# Patient Record
Sex: MALE | Race: WHITE | URBAN - METROPOLITAN AREA
[De-identification: names, ages, dates, MRNs, and addresses within clinical notes are randomized per-mention and may not be internally consistent; named-entity substitution may affect disease eponyms.]

---

## 2014-05-06 RX ORDER — RIVAROXABAN 15 MG-20MG
1 KIT ORAL
Qty: 0 | Refills: 0 | DISCHARGE
Start: 2014-05-06

## 2017-10-18 ENCOUNTER — INPATIENT (INPATIENT)
Facility: HOSPITAL | Age: 77
LOS: 5 days | Discharge: ANOTHER HOSPITAL | End: 2017-10-24
Attending: INTERNAL MEDICINE | Admitting: FAMILY MEDICINE
Payer: MEDICARE

## 2017-10-18 VITALS
HEART RATE: 61 BPM | DIASTOLIC BLOOD PRESSURE: 61 MMHG | TEMPERATURE: 98 F | SYSTOLIC BLOOD PRESSURE: 140 MMHG | RESPIRATION RATE: 15 BRPM | OXYGEN SATURATION: 94 % | WEIGHT: 169.98 LBS | HEIGHT: 70 IN

## 2017-10-18 DIAGNOSIS — Z95.5 PRESENCE OF CORONARY ANGIOPLASTY IMPLANT AND GRAFT: Chronic | ICD-10-CM

## 2017-10-18 DIAGNOSIS — Z95.1 PRESENCE OF AORTOCORONARY BYPASS GRAFT: Chronic | ICD-10-CM

## 2017-10-18 DIAGNOSIS — Z98.89 OTHER SPECIFIED POSTPROCEDURAL STATES: Chronic | ICD-10-CM

## 2017-10-18 LAB
ALBUMIN SERPL ELPH-MCNC: 3.5 G/DL — SIGNIFICANT CHANGE UP (ref 3.3–5)
ALP SERPL-CCNC: 54 U/L — SIGNIFICANT CHANGE UP (ref 40–120)
ALT FLD-CCNC: 14 U/L — SIGNIFICANT CHANGE UP (ref 12–78)
ANION GAP SERPL CALC-SCNC: 11 MMOL/L — SIGNIFICANT CHANGE UP (ref 5–17)
ANISOCYTOSIS BLD QL: SLIGHT — SIGNIFICANT CHANGE UP
APTT BLD: 32 SEC — SIGNIFICANT CHANGE UP (ref 27.5–37.4)
AST SERPL-CCNC: 15 U/L — SIGNIFICANT CHANGE UP (ref 15–37)
BASOPHILS # BLD AUTO: 0.1 K/UL — SIGNIFICANT CHANGE UP (ref 0–0.2)
BASOPHILS NFR BLD AUTO: 0.7 % — SIGNIFICANT CHANGE UP (ref 0–2)
BILIRUB SERPL-MCNC: 0.9 MG/DL — SIGNIFICANT CHANGE UP (ref 0.2–1.2)
BUN SERPL-MCNC: 13 MG/DL — SIGNIFICANT CHANGE UP (ref 7–23)
CALCIUM SERPL-MCNC: 9.4 MG/DL — SIGNIFICANT CHANGE UP (ref 8.5–10.1)
CHLORIDE SERPL-SCNC: 101 MMOL/L — SIGNIFICANT CHANGE UP (ref 96–108)
CHOLEST SERPL-MCNC: 89 MG/DL — SIGNIFICANT CHANGE UP (ref 10–199)
CO2 SERPL-SCNC: 23 MMOL/L — SIGNIFICANT CHANGE UP (ref 22–31)
CREAT SERPL-MCNC: 1.42 MG/DL — HIGH (ref 0.5–1.3)
DIGOXIN SERPL-MCNC: 1.5 NG/ML — SIGNIFICANT CHANGE UP (ref 0.8–2)
EOSINOPHIL # BLD AUTO: 0.2 K/UL — SIGNIFICANT CHANGE UP (ref 0–0.5)
EOSINOPHIL NFR BLD AUTO: 1.7 % — SIGNIFICANT CHANGE UP (ref 0–6)
GLUCOSE BLDC GLUCOMTR-MCNC: 129 MG/DL — HIGH (ref 70–99)
GLUCOSE BLDC GLUCOMTR-MCNC: 197 MG/DL — HIGH (ref 70–99)
GLUCOSE SERPL-MCNC: 197 MG/DL — HIGH (ref 70–99)
HCT VFR BLD CALC: 40.7 % — SIGNIFICANT CHANGE UP (ref 39–50)
HDLC SERPL-MCNC: 34 MG/DL — LOW (ref 40–125)
HGB BLD-MCNC: 14 G/DL — SIGNIFICANT CHANGE UP (ref 13–17)
INR BLD: 1.39 RATIO — HIGH (ref 0.88–1.16)
LIPID PNL WITH DIRECT LDL SERPL: 32 MG/DL — SIGNIFICANT CHANGE UP
LYMPHOCYTES # BLD AUTO: 2.9 K/UL — SIGNIFICANT CHANGE UP (ref 1–3.3)
LYMPHOCYTES # BLD AUTO: 28.3 % — SIGNIFICANT CHANGE UP (ref 13–44)
MANUAL DIF COMMENT BLD-IMP: SIGNIFICANT CHANGE UP
MCHC RBC-ENTMCNC: 31.8 PG — SIGNIFICANT CHANGE UP (ref 27–34)
MCHC RBC-ENTMCNC: 34.3 GM/DL — SIGNIFICANT CHANGE UP (ref 32–36)
MCV RBC AUTO: 92.8 FL — SIGNIFICANT CHANGE UP (ref 80–100)
MONOCYTES # BLD AUTO: 1.7 K/UL — HIGH (ref 0–0.9)
MONOCYTES NFR BLD AUTO: 16.6 % — HIGH (ref 2–14)
NEUTROPHILS # BLD AUTO: 5.4 K/UL — SIGNIFICANT CHANGE UP (ref 1.8–7.4)
NEUTROPHILS NFR BLD AUTO: 52.7 % — SIGNIFICANT CHANGE UP (ref 43–77)
PLAT MORPH BLD: NORMAL — SIGNIFICANT CHANGE UP
PLATELET # BLD AUTO: 205 K/UL — SIGNIFICANT CHANGE UP (ref 150–400)
POIKILOCYTOSIS BLD QL AUTO: SLIGHT — SIGNIFICANT CHANGE UP
POTASSIUM SERPL-MCNC: 3.3 MMOL/L — LOW (ref 3.5–5.3)
POTASSIUM SERPL-SCNC: 3.3 MMOL/L — LOW (ref 3.5–5.3)
PROT SERPL-MCNC: 7 GM/DL — SIGNIFICANT CHANGE UP (ref 6–8.3)
PROTHROM AB SERPL-ACNC: 15.1 SEC — HIGH (ref 9.8–12.7)
RAPID RVP RESULT: DETECTED
RBC # BLD: 4.39 M/UL — SIGNIFICANT CHANGE UP (ref 4.2–5.8)
RBC # FLD: 12.2 % — SIGNIFICANT CHANGE UP (ref 10.3–14.5)
RBC BLD AUTO: SIGNIFICANT CHANGE UP
RV+EV RNA SPEC QL NAA+PROBE: DETECTED
SODIUM SERPL-SCNC: 135 MMOL/L — SIGNIFICANT CHANGE UP (ref 135–145)
TOTAL CHOLESTEROL/HDL RATIO MEASUREMENT: 2.6 RATIO — LOW (ref 3.4–9.6)
TRIGL SERPL-MCNC: 116 MG/DL — SIGNIFICANT CHANGE UP (ref 10–149)
TROPONIN I SERPL-MCNC: 0.02 NG/ML — SIGNIFICANT CHANGE UP (ref 0.01–0.04)
TROPONIN I SERPL-MCNC: 0.02 NG/ML — SIGNIFICANT CHANGE UP (ref 0.01–0.04)
TROPONIN I SERPL-MCNC: 0.04 NG/ML — SIGNIFICANT CHANGE UP (ref 0.01–0.04)
TSH SERPL-MCNC: 3.71 UU/ML — SIGNIFICANT CHANGE UP (ref 0.36–3.74)
WBC # BLD: 10.2 K/UL — SIGNIFICANT CHANGE UP (ref 3.8–10.5)
WBC # FLD AUTO: 10.2 K/UL — SIGNIFICANT CHANGE UP (ref 3.8–10.5)

## 2017-10-18 PROCEDURE — 72125 CT NECK SPINE W/O DYE: CPT | Mod: 26

## 2017-10-18 PROCEDURE — 99285 EMERGENCY DEPT VISIT HI MDM: CPT

## 2017-10-18 PROCEDURE — 71010: CPT | Mod: 26

## 2017-10-18 PROCEDURE — 70450 CT HEAD/BRAIN W/O DYE: CPT | Mod: 26

## 2017-10-18 PROCEDURE — 93010 ELECTROCARDIOGRAM REPORT: CPT

## 2017-10-18 PROCEDURE — 71250 CT THORAX DX C-: CPT | Mod: 26

## 2017-10-18 RX ORDER — INSULIN LISPRO 100/ML
VIAL (ML) SUBCUTANEOUS AT BEDTIME
Qty: 0 | Refills: 0 | Status: DISCONTINUED | OUTPATIENT
Start: 2017-10-18 | End: 2017-10-24

## 2017-10-18 RX ORDER — DIGOXIN 250 MCG
0.12 TABLET ORAL DAILY
Qty: 0 | Refills: 0 | Status: DISCONTINUED | OUTPATIENT
Start: 2017-10-18 | End: 2017-10-19

## 2017-10-18 RX ORDER — SODIUM CHLORIDE 9 MG/ML
1000 INJECTION, SOLUTION INTRAVENOUS
Qty: 0 | Refills: 0 | Status: DISCONTINUED | OUTPATIENT
Start: 2017-10-18 | End: 2017-10-24

## 2017-10-18 RX ORDER — CEFTRIAXONE 500 MG/1
1 INJECTION, POWDER, FOR SOLUTION INTRAMUSCULAR; INTRAVENOUS ONCE
Qty: 0 | Refills: 0 | Status: COMPLETED | OUTPATIENT
Start: 2017-10-18 | End: 2017-10-18

## 2017-10-18 RX ORDER — DEXTROSE 50 % IN WATER 50 %
1 SYRINGE (ML) INTRAVENOUS ONCE
Qty: 0 | Refills: 0 | Status: DISCONTINUED | OUTPATIENT
Start: 2017-10-18 | End: 2017-10-24

## 2017-10-18 RX ORDER — GLUCAGON INJECTION, SOLUTION 0.5 MG/.1ML
1 INJECTION, SOLUTION SUBCUTANEOUS ONCE
Qty: 0 | Refills: 0 | Status: DISCONTINUED | OUTPATIENT
Start: 2017-10-18 | End: 2017-10-24

## 2017-10-18 RX ORDER — CEFTRIAXONE 500 MG/1
1 INJECTION, POWDER, FOR SOLUTION INTRAMUSCULAR; INTRAVENOUS EVERY 24 HOURS
Qty: 0 | Refills: 0 | Status: DISCONTINUED | OUTPATIENT
Start: 2017-10-19 | End: 2017-10-24

## 2017-10-18 RX ORDER — RIVAROXABAN 15 MG-20MG
10 KIT ORAL EVERY 24 HOURS
Qty: 0 | Refills: 0 | Status: DISCONTINUED | OUTPATIENT
Start: 2017-10-18 | End: 2017-10-18

## 2017-10-18 RX ORDER — ASPIRIN/CALCIUM CARB/MAGNESIUM 324 MG
81 TABLET ORAL DAILY
Qty: 0 | Refills: 0 | Status: DISCONTINUED | OUTPATIENT
Start: 2017-10-18 | End: 2017-10-24

## 2017-10-18 RX ORDER — AZITHROMYCIN 500 MG/1
500 TABLET, FILM COATED ORAL EVERY 24 HOURS
Qty: 0 | Refills: 0 | Status: DISCONTINUED | OUTPATIENT
Start: 2017-10-19 | End: 2017-10-20

## 2017-10-18 RX ORDER — RIVAROXABAN 15 MG-20MG
15 KIT ORAL EVERY 24 HOURS
Qty: 0 | Refills: 0 | Status: DISCONTINUED | OUTPATIENT
Start: 2017-10-18 | End: 2017-10-24

## 2017-10-18 RX ORDER — CARVEDILOL PHOSPHATE 80 MG/1
6.25 CAPSULE, EXTENDED RELEASE ORAL EVERY 12 HOURS
Qty: 0 | Refills: 0 | Status: DISCONTINUED | OUTPATIENT
Start: 2017-10-18 | End: 2017-10-24

## 2017-10-18 RX ORDER — INSULIN LISPRO 100/ML
VIAL (ML) SUBCUTANEOUS
Qty: 0 | Refills: 0 | Status: DISCONTINUED | OUTPATIENT
Start: 2017-10-18 | End: 2017-10-24

## 2017-10-18 RX ORDER — DEXTROSE 50 % IN WATER 50 %
25 SYRINGE (ML) INTRAVENOUS ONCE
Qty: 0 | Refills: 0 | Status: DISCONTINUED | OUTPATIENT
Start: 2017-10-18 | End: 2017-10-24

## 2017-10-18 RX ORDER — SPIRONOLACTONE 25 MG/1
25 TABLET, FILM COATED ORAL DAILY
Qty: 0 | Refills: 0 | Status: DISCONTINUED | OUTPATIENT
Start: 2017-10-18 | End: 2017-10-24

## 2017-10-18 RX ORDER — DEXTROSE 50 % IN WATER 50 %
12.5 SYRINGE (ML) INTRAVENOUS ONCE
Qty: 0 | Refills: 0 | Status: DISCONTINUED | OUTPATIENT
Start: 2017-10-18 | End: 2017-10-24

## 2017-10-18 RX ORDER — ACETAMINOPHEN 500 MG
650 TABLET ORAL EVERY 6 HOURS
Qty: 0 | Refills: 0 | Status: DISCONTINUED | OUTPATIENT
Start: 2017-10-18 | End: 2017-10-24

## 2017-10-18 RX ORDER — AZITHROMYCIN 500 MG/1
500 TABLET, FILM COATED ORAL ONCE
Qty: 0 | Refills: 0 | Status: COMPLETED | OUTPATIENT
Start: 2017-10-18 | End: 2017-10-18

## 2017-10-18 RX ORDER — TAMSULOSIN HYDROCHLORIDE 0.4 MG/1
0.4 CAPSULE ORAL AT BEDTIME
Qty: 0 | Refills: 0 | Status: DISCONTINUED | OUTPATIENT
Start: 2017-10-18 | End: 2017-10-24

## 2017-10-18 RX ORDER — POTASSIUM CHLORIDE 20 MEQ
40 PACKET (EA) ORAL ONCE
Qty: 0 | Refills: 0 | Status: COMPLETED | OUTPATIENT
Start: 2017-10-18 | End: 2017-10-18

## 2017-10-18 RX ORDER — ATORVASTATIN CALCIUM 80 MG/1
20 TABLET, FILM COATED ORAL AT BEDTIME
Qty: 0 | Refills: 0 | Status: DISCONTINUED | OUTPATIENT
Start: 2017-10-18 | End: 2017-10-24

## 2017-10-18 RX ADMIN — Medication 1: at 17:33

## 2017-10-18 RX ADMIN — AZITHROMYCIN 255 MILLIGRAM(S): 500 TABLET, FILM COATED ORAL at 14:08

## 2017-10-18 RX ADMIN — TAMSULOSIN HYDROCHLORIDE 0.4 MILLIGRAM(S): 0.4 CAPSULE ORAL at 22:05

## 2017-10-18 RX ADMIN — Medication 40 MILLIEQUIVALENT(S): at 14:16

## 2017-10-18 RX ADMIN — Medication 81 MILLIGRAM(S): at 14:17

## 2017-10-18 RX ADMIN — ATORVASTATIN CALCIUM 20 MILLIGRAM(S): 80 TABLET, FILM COATED ORAL at 22:05

## 2017-10-18 RX ADMIN — CARVEDILOL PHOSPHATE 6.25 MILLIGRAM(S): 80 CAPSULE, EXTENDED RELEASE ORAL at 17:27

## 2017-10-18 RX ADMIN — SPIRONOLACTONE 25 MILLIGRAM(S): 25 TABLET, FILM COATED ORAL at 14:16

## 2017-10-18 RX ADMIN — RIVAROXABAN 15 MILLIGRAM(S): KIT at 17:27

## 2017-10-18 RX ADMIN — CEFTRIAXONE 100 GRAM(S): 500 INJECTION, POWDER, FOR SOLUTION INTRAMUSCULAR; INTRAVENOUS at 14:23

## 2017-10-18 NOTE — H&P ADULT - NSHPPHYSICALEXAM_GEN_ALL_CORE
Vital Signs Last 24 Hrs  T(C): 36.4 (18 Oct 2017 10:43), Max: 36.4 (18 Oct 2017 10:43)  T(F): 97.5 (18 Oct 2017 10:43), Max: 97.5 (18 Oct 2017 10:43)  HR: 61 (18 Oct 2017 10:43) (61 - 61)  BP: 140/61 (18 Oct 2017 10:43) (140/61 - 140/61)  RR: 15 (18 Oct 2017 10:43) (15 - 15)  SpO2: 94% (18 Oct 2017 10:43) (94% - 94%)

## 2017-10-18 NOTE — ED PROVIDER NOTE - CARE PLAN
Principal Discharge DX:	Syncope, unspecified syncope type  Secondary Diagnosis:	Pneumonia of right lung due to infectious organism, unspecified part of lung

## 2017-10-18 NOTE — H&P ADULT - PSH
S/P CABG x 3  1997  S/P carotid endarterectomy  left, S/P left carotid stent 2000  S/P lobectomy of lung  right upper 1998  S/P TURP    Stented coronary artery  2002, 2003

## 2017-10-18 NOTE — ED ADULT TRIAGE NOTE - CHIEF COMPLAINT QUOTE
syncope walking back from [facility], on blood thinners, denies injury at this time, unknown if hit head. trauma alert initiated.

## 2017-10-18 NOTE — ED PROVIDER NOTE - MEDICAL DECISION MAKING DETAILS
pt presenting s/p syncope and head trauma. Currently feels fatigue. Will check basic labs, CT head, trop and EKG. Admission for syncope.

## 2017-10-18 NOTE — H&P ADULT - ASSESSMENT
76 yo male with PMH of CAD s/p CABG and stents, CHF (unknown EF) HTN, HLD, DM2, prostate CA, h/o lung malignancy s/p radiation, h/o RUL lobectomy for recurrent PNA, A. fib on xarelto brought in by EMS for episode of syncope. Pt does not recall the whole event. As per wife at bedside pt was brushing his teeth and putting in his dentures when she heard a loud noise from the bathroom. Patients son ran into the bathroom and found his father on the floor. He picked him up and carried him to the bed. Prior to episode pt states he felt weak but denies any dizziness, chest pain, palpitations, headaches, SOB, abd pain, N/V. As per wife pt developed some rhinorrhea and cough last night. No fevers or chills. No recent travel, no sick contacts. He was feeling lethargic and has not been eating since last night.     In ED pt given ceftriaxone and azithromycin for possible PNA.     #Syncope - differential includes neurogenic vs cardiac vs vasovagal syncope  - admit to tele to r/o arrhythmias  - EKG with multiple PVCs  - continue ASA, BB, statin  - serial cardiac enzymes  - echo  - CT head negative  - EEG  - cardio consult    #Possible PNA vs pneumonitis on CXR  - pt with history of radiation to right lung, likely post radiation changes seen on CXR, will check CT chest  - hold off further abx for now until CT completed  - RVP    #A. fib  - currently rate controlled  - continue digoxin and coreg  - check dig level  - continue xarelto    #CKD stage 3  - unknown baseline Cr (Cr 1.8 5/2016)  - monitor renal panel    #CAD s/p CABG and stents  - continue ASA, BB, statin    #CHF - likely systolic  - continue BB, metolazone, aldactone  - check echo    #DM2  - check HbA1c  - hold metformin  - ISS  - diabetic diet    #HTN  - continue home meds    #HLD  - continue statin    #DVT prophylaxis  - on xarelto 78 yo male with PMH of CAD s/p CABG and stents, CHF (unknown EF) HTN, HLD, DM2, prostate CA, h/o lung malignancy s/p radiation, h/o RUL lobectomy for recurrent PNA, A. fib on xarelto brought in by EMS for episode of syncope. Pt does not recall the whole event. As per wife at bedside pt was brushing his teeth and putting in his dentures when she heard a loud noise from the bathroom. Patients son ran into the bathroom and found his father on the floor. He picked him up and carried him to the bed. Prior to episode pt states he felt weak but denies any dizziness, chest pain, palpitations, headaches, SOB, abd pain, N/V. As per wife pt developed some rhinorrhea and cough last night. No fevers or chills. No recent travel, no sick contacts. He was feeling lethargic and has not been eating since last night.     In ED pt given ceftriaxone and azithromycin for possible PNA.     #Syncope - differential includes neurogenic vs cardiac vs vasovagal syncope  - admit to tele to r/o arrhythmias  - EKG with multiple PVCs  - continue ASA, BB, statin  - serial cardiac enzymes  - echo  - CT head negative  - EEG  - cardio consult  - check orthostatics    #Possible PNA vs pneumonitis on CXR  - pt with history of radiation to right lung, likely post radiation changes seen on CXR, will check CT chest  - hold off further abx for now until CT completed  - RVP    #A. fib  - currently rate controlled  - continue digoxin and coreg  - check dig level  - continue xarelto    #CKD stage 3  - unknown baseline Cr (Cr 1.8 5/2016)  - monitor renal panel    #CAD s/p CABG and stents  - continue ASA, BB, statin    #CHF - likely systolic  - continue BB, metolazone, aldactone  - check echo    #DM2  - check HbA1c  - hold metformin  - ISS  - diabetic diet    #HTN  - continue home meds    #HLD  - continue statin    #DVT prophylaxis  - on xarelto 78 yo male with PMH of CAD s/p CABG and stents, CHF (unknown EF) HTN, HLD, DM2, prostate CA, h/o lung malignancy s/p radiation, h/o RUL lobectomy for recurrent PNA, A. fib on xarelto brought in by EMS for episode of syncope. Pt does not recall the whole event. As per wife at bedside pt was brushing his teeth and putting in his dentures when she heard a loud noise from the bathroom. Patients son ran into the bathroom and found his father on the floor. He picked him up and carried him to the bed. Prior to episode pt states he felt weak but denies any dizziness, chest pain, palpitations, headaches, SOB, abd pain, N/V. As per wife pt developed some rhinorrhea and cough last night. No fevers or chills. No recent travel, no sick contacts. He was feeling lethargic and has not been eating since last night.     In ED pt given ceftriaxone and azithromycin for possible PNA.     #Syncope - differential includes neurogenic vs cardiac vs vasovagal syncope  - admit to tele to r/o arrhythmias  - EKG with multiple PVCs, ST-T wave abnormalities  - continue ASA, BB, statin  - serial cardiac enzymes  - echo  - CT head negative  - EEG  - cardio consult  - check orthostatics    #Possible PNA vs pneumonitis on CXR  - pt with history of radiation to right lung, likely post radiation changes seen on CXR, will check CT chest  - hold off further abx for now until CT completed  - RVP    #A. fib  - currently rate controlled  - continue digoxin and coreg  - check dig level  - continue xarelto    #CKD stage 3  - unknown baseline Cr (Cr 1.8 5/2016)  - monitor renal panel    #CAD s/p CABG and stents  - continue ASA, BB, statin    #CHF - likely systolic  - continue BB, metolazone, aldactone  - check echo    #DM2  - check HbA1c  - hold metformin  - ISS  - diabetic diet    #HTN  - continue home meds    #HLD  - continue statin    #DVT prophylaxis  - on xarelto 78 yo male with PMH of CAD s/p CABG and stents, CHF (unknown EF) HTN, HLD, DM2, prostate CA, h/o lung malignancy s/p radiation, h/o RUL lobectomy for recurrent PNA, A. fib on xarelto brought in by EMS for episode of syncope. Pt does not recall the whole event. As per wife at bedside pt was brushing his teeth and putting in his dentures when she heard a loud noise from the bathroom. Patients son ran into the bathroom and found his father on the floor. He picked him up and carried him to the bed. Prior to episode pt states he felt weak but denies any dizziness, chest pain, palpitations, headaches, SOB, abd pain, N/V. As per wife pt developed some rhinorrhea and cough last night. No fevers or chills. No recent travel, no sick contacts. He was feeling lethargic and has not been eating since last night.     In ED pt given ceftriaxone and azithromycin for possible PNA.     #Syncope - differential includes neurogenic vs cardiac vs vasovagal syncope  - admit to tele to r/o arrhythmias  - EKG with multiple PVCs, ST-T wave abnormalities  - continue ASA, BB, statin  - serial cardiac enzymes  - echo  - CT head negative  - EEG  - cardio consult  - check orthostatics    #RLL PNA - community acquired  - will treat for gram negative bacteria  - continue ceftriaxone and azithromycin  - f/u cultures  - RVP    #A. fib  - currently rate controlled  - continue digoxin and coreg  - check dig level  - continue xarelto    #CKD stage 3  - unknown baseline Cr (Cr 1.8 5/2016)  - monitor renal panel    #CAD s/p CABG and stents  - continue ASA, BB, statin    #CHF - likely systolic  - continue BB, metolazone, aldactone  - check echo    #DM2  - check HbA1c  - hold metformin  - ISS  - diabetic diet    #HTN  - continue home meds    #HLD  - continue statin    #DVT prophylaxis  - on xarelto 76 yo male with PMH of CAD s/p CABG and stents, CHF (unknown EF) HTN, HLD, DM2, prostate CA, h/o lung malignancy s/p radiation, h/o RUL lobectomy for recurrent PNA, A. fib on xarelto brought in by EMS for episode of syncope. Pt does not recall the whole event. As per wife at bedside pt was brushing his teeth and putting in his dentures when she heard a loud noise from the bathroom. Patients son ran into the bathroom and found his father on the floor. He picked him up and carried him to the bed. Prior to episode pt states he felt weak but denies any dizziness, chest pain, palpitations, headaches, SOB, abd pain, N/V. As per wife pt developed some rhinorrhea and cough last night. No fevers or chills. No recent travel, no sick contacts. He was feeling lethargic and has not been eating since last night.     In ED pt given ceftriaxone and azithromycin for possible PNA.     #Syncope - differential includes neurogenic vs cardiac vs vasovagal syncope  - admit to tele to r/o arrhythmias  - EKG with multiple PVCs, ST-T wave abnormalities  - continue ASA, BB, statin  - serial cardiac enzymes  - echo  - CT head negative  - cardio consult  - check orthostatics    #RLL PNA - community acquired  - will treat for gram negative bacteria  - continue ceftriaxone and azithromycin  - f/u cultures  - RVP    #A. fib  - currently rate controlled  - continue digoxin and coreg  - check dig level  - continue xarelto    #CKD stage 3  - unknown baseline Cr (Cr 1.8 5/2016)  - monitor renal panel    #CAD s/p CABG and stents  - continue ASA, BB, statin    #CHF - likely systolic  - continue BB, metolazone, aldactone  - check echo    #DM2  - check HbA1c  - hold metformin  - ISS  - diabetic diet    #HTN  - continue home meds    #HLD  - continue statin    #DVT prophylaxis  - on xarelto

## 2017-10-18 NOTE — ED PROVIDER NOTE - PMH
BPH (benign prostatic hyperplasia)    CAD (coronary artery disease)  S/P coronary artery stents 2002, 2003  Carotid stenosis, left    Cataracts, bilateral    DM (diabetes mellitus)    HLD (hyperlipidemia)    HTN (hypertension)    Lung cancer    Osteoarthritis    Prostate ca

## 2017-10-18 NOTE — ED PROVIDER NOTE - PROGRESS NOTE DETAILS
Matthew Sarmiento on behalf of Attending Dr. Zepeda. Pt with community acquired PNA. Will give abx. AJM: Pt stable. admitted to hospitalist on tele for syncope and pna

## 2017-10-18 NOTE — ED PROVIDER NOTE - OBJECTIVE STATEMENT
76 y/o M with PMHx of AFib on Xarelto and heart failure, lung CA, received chemo and in remission, HTN, HLD and DM presents to the ED with wife at bedside s/p syncope this morning. pt states he went to bathroom to urinate and as when he was at the sink to wash his hands he passed out. Pt does not have good memory of the event. Pt's wife states that Pt was trying to put his dentures in while at the sink when he passed out. Pt was found by son against the wall with blank stare. Pt has not passed out before. +fatigue. Denies CP or abd pain. Pt's wife states that Pt had rhinorrhea yesterday and gave him Tylenol and DayQuil after pt was feeling cold. Pt was fine through the night. Pt was give Tylenol and DayQuil this morning as well. No PMD at this time. Dr. Clark at [facility] in [place]. Pt also has pulmonologist associated with [facility] in [place]. 78 y/o M with PMHx of AFib on Xarelto and heart failure, lung CA, received chemo and in remission, HTN, HLD and DM presents to the ED with wife at bedside s/p syncope this morning. pt states he went to bathroom to urinate and as when he was at the sink to wash his hands he passed out. Pt does not have good memory of the event. Pt's wife states that Pt was trying to put his dentures in while at the sink when he passed out. Pt was found by son against the wall with blank stare. Questionable head trauma. Pt has not passed out before. +fatigue. Denies CP or abd pain. Pt's wife states that Pt had rhinorrhea yesterday and gave him Tylenol and DayQuil after pt was feeling cold. Pt was fine through the night. Pt was give Tylenol and DayQuil this morning as well. No PMD at this time. Dr. Clark at [facility] in [place]. Pt also has pulmonologist associated with [facility] in [place].

## 2017-10-18 NOTE — ED ADULT NURSE REASSESSMENT NOTE - NS ED NURSE REASSESS COMMENT FT1
Patient A&Ox4, VSS. Patient denies dizzines, denies n/v. Denies pain/discomfort at this time. Generalized weakness, drowsy but arrousable. Patient A&Ox4, VSS. Patient denies dizziness, denies n/v. Denies pain/discomfort at this time. Generalized weakness, drowsy but arrousable. Dry, nonproductive cough, lungs clear, diminished on R-side to auscultation; denies SOB. Safety & comfort measures maintained. Will continue to monitor.

## 2017-10-18 NOTE — ED ADULT NURSE NOTE - CHIEF COMPLAINT QUOTE
syncope walking back from [facility], on blood thinners, denies injury at this time, unknown if hit head. trauma alert initiated.

## 2017-10-18 NOTE — H&P ADULT - HISTORY OF PRESENT ILLNESS
76 yo male with PMH of CAD s/p CABG and stents, CHF (unknown EF) HTN, HLD, DM2, prostate CA, h/o lung malignancy s/p radiation, h/o RUL lobectomy for recurrent PNA, A. fib on xarelto brought in by EMS for episode of syncope. Pt does not recall the whole event. As per wife at bedside pt was brushing his teeth and putting in his dentures when she heard a loud noise from the bathroom. Patients son ran into the bathroom and found his father on the floor. He picked him up and carried him to the bed. Prior to episode pt states he felt weak but denies any dizziness, chest pain, palpitations, headaches, SOB, abd pain, N/V. As per wife pt developed some rhinorrhea and cough last night. No fevers or chills. No recent travel, no sick contacts. He was feeling lethargic and has not been eating since last night.     In ED pt given ceftriaxone and azithromycin for possible PNA.

## 2017-10-19 ENCOUNTER — TRANSCRIPTION ENCOUNTER (OUTPATIENT)
Age: 77
End: 2017-10-19

## 2017-10-19 LAB
ANION GAP SERPL CALC-SCNC: 9 MMOL/L — SIGNIFICANT CHANGE UP (ref 5–17)
BASOPHILS # BLD AUTO: 0 K/UL — SIGNIFICANT CHANGE UP (ref 0–0.2)
BASOPHILS NFR BLD AUTO: 0.6 % — SIGNIFICANT CHANGE UP (ref 0–2)
BUN SERPL-MCNC: 15 MG/DL — SIGNIFICANT CHANGE UP (ref 7–23)
CALCIUM SERPL-MCNC: 9.2 MG/DL — SIGNIFICANT CHANGE UP (ref 8.5–10.1)
CHLORIDE SERPL-SCNC: 100 MMOL/L — SIGNIFICANT CHANGE UP (ref 96–108)
CO2 SERPL-SCNC: 27 MMOL/L — SIGNIFICANT CHANGE UP (ref 22–31)
CREAT SERPL-MCNC: 1.12 MG/DL — SIGNIFICANT CHANGE UP (ref 0.5–1.3)
EOSINOPHIL # BLD AUTO: 0.2 K/UL — SIGNIFICANT CHANGE UP (ref 0–0.5)
EOSINOPHIL NFR BLD AUTO: 2.7 % — SIGNIFICANT CHANGE UP (ref 0–6)
GLUCOSE BLDC GLUCOMTR-MCNC: 145 MG/DL — HIGH (ref 70–99)
GLUCOSE BLDC GLUCOMTR-MCNC: 145 MG/DL — HIGH (ref 70–99)
GLUCOSE BLDC GLUCOMTR-MCNC: 185 MG/DL — HIGH (ref 70–99)
GLUCOSE BLDC GLUCOMTR-MCNC: 214 MG/DL — HIGH (ref 70–99)
GLUCOSE SERPL-MCNC: 141 MG/DL — HIGH (ref 70–99)
HBA1C BLD-MCNC: 7.9 % — HIGH (ref 4–5.6)
HCT VFR BLD CALC: 37 % — LOW (ref 39–50)
HGB BLD-MCNC: 12.8 G/DL — LOW (ref 13–17)
LYMPHOCYTES # BLD AUTO: 1.1 K/UL — SIGNIFICANT CHANGE UP (ref 1–3.3)
LYMPHOCYTES # BLD AUTO: 16.6 % — SIGNIFICANT CHANGE UP (ref 13–44)
MAGNESIUM SERPL-MCNC: 1.4 MG/DL — LOW (ref 1.6–2.6)
MCHC RBC-ENTMCNC: 31.8 PG — SIGNIFICANT CHANGE UP (ref 27–34)
MCHC RBC-ENTMCNC: 34.5 GM/DL — SIGNIFICANT CHANGE UP (ref 32–36)
MCV RBC AUTO: 92.4 FL — SIGNIFICANT CHANGE UP (ref 80–100)
MONOCYTES # BLD AUTO: 1.1 K/UL — HIGH (ref 0–0.9)
MONOCYTES NFR BLD AUTO: 16.5 % — HIGH (ref 2–14)
NEUTROPHILS # BLD AUTO: 4.3 K/UL — SIGNIFICANT CHANGE UP (ref 1.8–7.4)
NEUTROPHILS NFR BLD AUTO: 63.5 % — SIGNIFICANT CHANGE UP (ref 43–77)
PHOSPHATE SERPL-MCNC: 2.6 MG/DL — SIGNIFICANT CHANGE UP (ref 2.5–4.5)
PLATELET # BLD AUTO: 194 K/UL — SIGNIFICANT CHANGE UP (ref 150–400)
POTASSIUM SERPL-MCNC: 4.2 MMOL/L — SIGNIFICANT CHANGE UP (ref 3.5–5.3)
POTASSIUM SERPL-SCNC: 4.2 MMOL/L — SIGNIFICANT CHANGE UP (ref 3.5–5.3)
RBC # BLD: 4.01 M/UL — LOW (ref 4.2–5.8)
RBC # FLD: 11.8 % — SIGNIFICANT CHANGE UP (ref 10.3–14.5)
SODIUM SERPL-SCNC: 136 MMOL/L — SIGNIFICANT CHANGE UP (ref 135–145)
WBC # BLD: 6.8 K/UL — SIGNIFICANT CHANGE UP (ref 3.8–10.5)
WBC # FLD AUTO: 6.8 K/UL — SIGNIFICANT CHANGE UP (ref 3.8–10.5)

## 2017-10-19 PROCEDURE — 93010 ELECTROCARDIOGRAM REPORT: CPT

## 2017-10-19 PROCEDURE — 93306 TTE W/DOPPLER COMPLETE: CPT | Mod: 26

## 2017-10-19 PROCEDURE — 93042 RHYTHM ECG REPORT: CPT

## 2017-10-19 PROCEDURE — 99223 1ST HOSP IP/OBS HIGH 75: CPT

## 2017-10-19 RX ORDER — AZITHROMYCIN 500 MG/1
500 TABLET, FILM COATED ORAL
Qty: 0 | Refills: 0 | DISCHARGE
Start: 2017-10-19

## 2017-10-19 RX ORDER — ACETAMINOPHEN 500 MG
2 TABLET ORAL
Qty: 0 | Refills: 0 | COMMUNITY
Start: 2017-10-19

## 2017-10-19 RX ORDER — CEFTRIAXONE 500 MG/1
1 INJECTION, POWDER, FOR SOLUTION INTRAMUSCULAR; INTRAVENOUS
Qty: 0 | Refills: 0 | COMMUNITY
Start: 2017-10-19

## 2017-10-19 RX ORDER — LANOLIN ALCOHOL/MO/W.PET/CERES
3 CREAM (GRAM) TOPICAL AT BEDTIME
Qty: 0 | Refills: 0 | Status: DISCONTINUED | OUTPATIENT
Start: 2017-10-19 | End: 2017-10-24

## 2017-10-19 RX ORDER — TAMSULOSIN HYDROCHLORIDE 0.4 MG/1
1 CAPSULE ORAL
Qty: 0 | Refills: 0 | DISCHARGE
Start: 2017-10-19

## 2017-10-19 RX ORDER — INSULIN LISPRO 100/ML
0 VIAL (ML) SUBCUTANEOUS
Qty: 0 | Refills: 0 | COMMUNITY
Start: 2017-10-19

## 2017-10-19 RX ADMIN — CEFTRIAXONE 100 GRAM(S): 500 INJECTION, POWDER, FOR SOLUTION INTRAMUSCULAR; INTRAVENOUS at 09:19

## 2017-10-19 RX ADMIN — CARVEDILOL PHOSPHATE 6.25 MILLIGRAM(S): 80 CAPSULE, EXTENDED RELEASE ORAL at 17:25

## 2017-10-19 RX ADMIN — ATORVASTATIN CALCIUM 20 MILLIGRAM(S): 80 TABLET, FILM COATED ORAL at 21:09

## 2017-10-19 RX ADMIN — Medication 81 MILLIGRAM(S): at 11:37

## 2017-10-19 RX ADMIN — SPIRONOLACTONE 25 MILLIGRAM(S): 25 TABLET, FILM COATED ORAL at 06:02

## 2017-10-19 RX ADMIN — RIVAROXABAN 15 MILLIGRAM(S): KIT at 17:23

## 2017-10-19 RX ADMIN — Medication: at 14:26

## 2017-10-19 RX ADMIN — Medication 3 MILLIGRAM(S): at 02:38

## 2017-10-19 RX ADMIN — AZITHROMYCIN 255 MILLIGRAM(S): 500 TABLET, FILM COATED ORAL at 09:51

## 2017-10-19 RX ADMIN — Medication 100 MILLIGRAM(S): at 21:46

## 2017-10-19 RX ADMIN — TAMSULOSIN HYDROCHLORIDE 0.4 MILLIGRAM(S): 0.4 CAPSULE ORAL at 21:09

## 2017-10-19 RX ADMIN — CARVEDILOL PHOSPHATE 6.25 MILLIGRAM(S): 80 CAPSULE, EXTENDED RELEASE ORAL at 06:02

## 2017-10-19 RX ADMIN — Medication 0.12 MILLIGRAM(S): at 06:02

## 2017-10-19 NOTE — DISCHARGE NOTE ADULT - PATIENT PORTAL LINK FT
“You can access the [employer] Patient Portal, offered by [facility], by registering with the following website: [email or web address]”

## 2017-10-19 NOTE — CONSULT NOTE ADULT - SUBJECTIVE AND OBJECTIVE BOX
CHIEF COMPLAINT: Patient is a 77y old  Male who presents with a chief complaint of I have a spot on my right lung that's gotten bigger, so I'm having part of the right lung removed. (18 Oct 2017 15:06)      HPI:  78 yo male with PMH of CAD s/p CABG and stents, CHF (unknown EF) HTN, HLD, DM2, prostate CA, h/o lung malignancy s/p radiation, h/o RUL lobectomy for recurrent PNA, A. fib on xarelto brought in by EMS for episode of syncope. Pt does not recall the whole event. As per wife at bedside pt was brushing his teeth and putting in his dentures when she heard a loud noise from the bathroom. Patients son ran into the bathroom and found his father on the floor. He picked him up and carried him to the bed. Prior to episode pt states he felt weak but denies any dizziness, chest pain, palpitations, headaches, SOB, abd pain, N/V. As per wife pt developed some rhinorrhea and cough last night. No fevers or chills. No recent travel, no sick contacts. He was feeling lethargic and has not been eating since last night.     In ED pt given ceftriaxone and azithromycin for possible PNA. (18 Oct 2017 12:47)    10/19-patient with significant cardiac risk factors, with telemetry-showing ventricular arrhythmia(AIVR), pauses of over 2.5sec.  Has R infiltrate-unsure if pneumonia or somthing more ominous      PMHx: PAST MEDICAL & SURGICAL HISTORY:  Osteoarthritis  Lung cancer  DM (diabetes mellitus)  Cataracts, bilateral  Prostate ca  BPH (benign prostatic hyperplasia)  Carotid stenosis, left  CAD (coronary artery disease): S/P coronary artery stents 2002, 2003  HLD (hyperlipidemia)  HTN (hypertension)  S/P CABG x 3: 1997  S/P lobectomy of lung: right upper 1998  S/P TURP  Stented coronary artery: 2002, 2003  S/P carotid endarterectomy: left, S/P left carotid stent 2000        Soc Hx:       Allergies: Allergies    morphine (Hypotension)  oxycodone (Other)    Intolerances          REVIEW OF SYSTEMS:    CONSTITUTIONAL: weakness, chills  EYES/ENT: No visual changes;  No vertigo or throat pain   NECK: No pain or stiffness  RESPIRATORY: No cough, wheezing, hemoptysis; No shortness of breath  CARDIOVASCULAR: No chest pain or palpitations  GASTROINTESTINAL: No abdominal or epigastric pain. No nausea, vomiting, or hematemesis; No diarrhea or constipation. No melena or hematochezia.    Vital Signs Last 24 Hrs  T(C): 37 (19 Oct 2017 05:25), Max: 37 (19 Oct 2017 05:25)  T(F): 98.6 (19 Oct 2017 05:25), Max: 98.6 (19 Oct 2017 05:25)  HR: 63 (19 Oct 2017 05:25) (61 - 66)  BP: 117/65 (19 Oct 2017 05:25) (117/65 - 142/62)  BP(mean): 78 (19 Oct 2017 05:25) (78 - 78)  RR: 18 (18 Oct 2017 20:53) (15 - 118)  SpO2: 98% (19 Oct 2017 05:25) (94% - 100%)    I&O's Summary      CAPILLARY BLOOD GLUCOSE      POCT Blood Glucose.: 129 mg/dL (18 Oct 2017 21:34)  POCT Blood Glucose.: 197 mg/dL (18 Oct 2017 16:53)  POCT Blood Glucose.: 193 mg/dL (18 Oct 2017 11:42)      PHYSICAL EXAM:   Constitutional: NAD, awake and alert, well-developed  HEENT: PERR, EOMI, Normal Hearing, MMM  Neck: JVD  Respiratory:  rhonchi  Cardiovascular: S1 and S2, regular rate and rhythm, Murmurs  Gastrointestinal: Bowel Sounds present, soft, nontender, nondistended, no guarding, no rebound  Extremities: No peripheral edema      MEDICATIONS:  MEDICATIONS  (STANDING):  aspirin enteric coated 81 milliGRAM(s) Oral daily  atorvastatin 20 milliGRAM(s) Oral at bedtime  azithromycin  IVPB 500 milliGRAM(s) IV Intermittent every 24 hours  carvedilol 6.25 milliGRAM(s) Oral every 12 hours  cefTRIAXone   IVPB 1 Gram(s) IV Intermittent every 24 hours  dextrose 5%. 1000 milliLiter(s) (50 mL/Hr) IV Continuous <Continuous>  dextrose 50% Injectable 12.5 Gram(s) IV Push once  dextrose 50% Injectable 25 Gram(s) IV Push once  dextrose 50% Injectable 25 Gram(s) IV Push once  digoxin     Tablet 0.125 milliGRAM(s) Oral daily  insulin lispro (HumaLOG) corrective regimen sliding scale   SubCutaneous three times a day before meals  insulin lispro (HumaLOG) corrective regimen sliding scale   SubCutaneous at bedtime  metolazone 2.5 milliGRAM(s) Oral daily  rivaroxaban 15 milliGRAM(s) Oral every 24 hours  spironolactone 25 milliGRAM(s) Oral daily  tamsulosin 0.4 milliGRAM(s) Oral at bedtime      LABS: All Labs Reviewed:                        12.8   6.8   )-----------( 194      ( 19 Oct 2017 05:47 )             37.0     10-19    136  |  100  |  15  ----------------------------<  141<H>  4.2   |  27  |  1.12    Ca    9.2      19 Oct 2017 05:47  Phos  2.6     10-19  Mg     1.4     10-19    TPro  7.0  /  Alb  3.5  /  TBili  0.9  /  DBili  x   /  AST  15  /  ALT  14  /  AlkPhos  54  10-18    PT/INR - ( 18 Oct 2017 11:00 )   PT: 15.1 sec;   INR: 1.39 ratio         PTT - ( 18 Oct 2017 11:00 )  PTT:32.0 sec  CARDIAC MARKERS ( 18 Oct 2017 20:07 )  0.044 ng/mL / x     / x     / x     / x      CARDIAC MARKERS ( 18 Oct 2017 13:53 )  0.024 ng/mL / x     / x     / x     / x      CARDIAC MARKERS ( 18 Oct 2017 11:00 )  0.023 ng/mL / x     / x     / x     / x            Blood Culture:   lipid profile lipid profile                          10-18 @ 13:53  cholesterol           89 mg/dL  Direct LDL            32 mg/dL  HDL cholesterol       34 mg/dL  HDL/Total cholesterol --  Triglycerides, serum  116 mg/dL        RADIOLOGY:  < from: CT Chest No Cont (10.18.17 @ 13:35) >  LUNGS, AIRWAYS: The central airways are patent. There is background   centrilobular emphysema. Prior right upper lobe wedge resection. There is   a large consolidation in the right lower lobe.    EKG:    Telemetry:  sinus rhtyhm, PVC's, AIVR, pauses    ECHO:

## 2017-10-19 NOTE — DISCHARGE NOTE ADULT - CARE PROVIDER_API CALL
PCP/cardio - Dr Damon,   Phone: (   )    -  Fax: (   )    - PCP/Cardio- Dr Lam,   Phone: (   )    -  Fax: (   )    -

## 2017-10-19 NOTE — DISCHARGE NOTE ADULT - MEDICATION SUMMARY - MEDICATIONS TO STOP TAKING
I will STOP taking the medications listed below when I get home from the hospital:    metformin 500 mg oral tablet  -- 1 tab(s) by mouth 2 times a day I will STOP taking the medications listed below when I get home from the hospital:    metformin 500 mg oral tablet  -- 1 tab(s) by mouth 2 times a day    Digitek 125 mcg (0.125 mg) oral tablet  -- 1 tab(s) by mouth once a day

## 2017-10-19 NOTE — DISCHARGE NOTE ADULT - HOSPITAL COURSE
HOSPITALIST PROGRESS NOTE:  SUBJECTIVE:  PCP: All Physicians from [facility]  PMD: Gordo Joseph  Cardio: Dr. Clark  Pulm: Dr. Arroyo    Chief Complaint: Patient is a 77y old  Male who presents with a chief complaint of I have a spot on my right lung that's gotten bigger, so I'm having part of the right lung removed. (18 Oct 2017 15:06)    HPI:  76 yo male with PMH of CAD s/p CABG and stents, CHF (unknown EF) HTN, HLD, DM2, prostate CA, h/o lung malignancy s/p radiation, h/o RUL lobectomy for recurrent PNA, A. fib on xarelto brought in by EMS for episode of syncope. Pt does not recall the whole event. As per wife at bedside pt was brushing his teeth and putting in his dentures when she heard a loud noise from the bathroom. Patients son ran into the bathroom and found his father on the floor. He picked him up and carried him to the bed. Prior to episode pt states he felt weak but denies any dizziness, chest pain, palpitations, headaches, SOB, abd pain, N/V. As per wife pt developed some rhinorrhea and cough last night. No fevers or chills. No recent travel, no sick contacts. He was feeling lethargic and has not been eating since last night.   In ED pt given ceftriaxone and azithromycin for possible PNA. (18 Oct 2017 12:47)    10/19: Overnight patient has been bradycardic; Patient is requesting transfer to [facility] under Dr Lam who is his cardiologist for continuity of care.  Patient has no complaints other than a cough.       #Syncope 2ndry to bradycardia and Pneumonia   - continue tele -  -evidence of AIVR, < 3 sec pause   - EKG with multiple PVCs, ST-T wave abnormalities  - CT head/C-spine - Neg  - continue ASA, BB, statin  - serial cardiac enzymes  - FU echo  - cardio consult appreciated   - EP consult appreciated   - check orthostatics    #RLL PNA - community acquired with superimposed Viral infection  - +RVP with rhinovirus  - CT chest Large consolidation in the right lower lobe.  - will treat for gram negative bacteria  - continue ceftriaxone and azithromycin  - f/u cultures     #A. fib  - currently bradycardia  - continue digoxin and coreg  - dig level WNL  - continue xarelto    #CKD stage 3  - unknown baseline Cr (Cr 1.8 5/2016)  - monitor renal panel    #CAD s/p CABG and stents  - no active chest pain  - continue ASA, BB, statin  #CHF - likely systolic - compensated  - continue BB, metolazone, aldactone  - check echo    #DM2  - HbA1c 7.9  - hold metformin  - ISS  - diabetic diet    #HTN  - continue home meds    #HLD  - continue statin    #DVT prophylaxis  - on xarelto    *Discharge Instructions/Follow Up/Pending Labs:  -Transfer patient to [facility] under Dr Damon;  transfer consent done.     Total time: 45 minutes HOSPITALIST PROGRESS NOTE:  SUBJECTIVE:  PCP: All Physicians from [facility]  PMD: Gordo Joseph  Cardio: Dr. Clark  Pulm: Dr. Arroyo    Chief Complaint: Patient is a 77y old  Male who presents with a chief complaint of I have a spot on my right lung that's gotten bigger, so I'm having part of the right lung removed. (18 Oct 2017 15:06)    HPI:  76 yo male with PMH of CAD s/p CABG and stents, CHF (unknown EF) HTN, HLD, DM2, prostate CA, h/o lung malignancy s/p radiation, h/o RUL lobectomy for recurrent PNA, A. fib on xarelto brought in by EMS for episode of syncope. Pt does not recall the whole event. As per wife at bedside pt was brushing his teeth and putting in his dentures when she heard a loud noise from the bathroom. Patients son ran into the bathroom and found his father on the floor. He picked him up and carried him to the bed. Prior to episode pt states he felt weak but denies any dizziness, chest pain, palpitations, headaches, SOB, abd pain, N/V. As per wife pt developed some rhinorrhea and cough last night. No fevers or chills. No recent travel, no sick contacts. He was feeling lethargic and has not been eating since last night.   In ED pt given ceftriaxone and azithromycin for possible PNA. (18 Oct 2017 12:47)    10/19: Overnight patient has been bradycardic; Patient is requesting transfer to [facility] under Dr Landrum who is his cardiologist for continuity of care.  Patient has no complaints other than a cough.   10/20: yesterday transfer was not initiated by patients cardiologist. Todayspoke with patients cardio, Dr Sue landrum and the transfer center and awaiting bed.  Over night patient had frequent pauses on tele. vitals have been stable     #Syncope 2ndry to bradycardia and Pneumonia   - continue tele -  -evidence of AIVR, < 3 sec pause   - EKG with multiple PVCs, ST-T wave abnormalities  - CT head/C-spine - Neg  - continue ASA, BB, statin  - serial cardiac enzymes  - echo - Moderate global LV hypocontactility; Estimated left ventricular ejection fraction is 40 %.    - cardio consult appreciated   - EP consult appreciated   - check orthostatics    #RLL PNA - community acquired with superimposed Viral infection  - +RVP with rhinovirus  - CT chest Large consolidation in the right lower lobe.  - will treat for gram negative bacteria  - continue ceftriaxone and azithromycin  - f/u cultures     #A. fib  - currently bradycardia  - continue digoxin and coreg  - dig level WNL  - continue xarelto    #CKD stage 3  - unknown baseline Cr (Cr 1.8 5/2016)  - monitor renal panel    #CAD s/p CABG and stents  - no active chest pain  - continue ASA, BB, statin  #CHF - likely systolic - compensated  - continue BB, metolazone, aldactone  - check echo    #DM2  - HbA1c 7.9  - hold metformin  - ISS  - diabetic diet    #HTN  - continue home meds    #HLD  - continue statin    #DVT prophylaxis  - on xarelto    *Discharge Instructions/Follow Up/Pending Labs:  [facility] sue Landrum; Discussed with Dr landrum and [facility], awaiting for bed;  transfer consent done.    Total time: 45 minutes HOSPITALIST PROGRESS NOTE:  SUBJECTIVE:  PCP: All Physicians from [facility]  PMD: Gordo Joseph  Cardio: Dr. Clark  Pulm: Dr. Arroyo    Chief Complaint: Patient is a 77y old  Male who presents with a chief complaint of I have a spot on my right lung that's gotten bigger, so I'm having part of the right lung removed. (18 Oct 2017 15:06)    HPI:  76 yo male with PMH of CAD s/p CABG and stents, CHF (unknown EF) HTN, HLD, DM2, prostate CA, h/o lung malignancy s/p radiation, h/o RUL lobectomy for recurrent PNA, A. fib on xarelto brought in by EMS for episode of syncope. Pt does not recall the whole event. As per wife at bedside pt was brushing his teeth and putting in his dentures when she heard a loud noise from the bathroom. Patients son ran into the bathroom and found his father on the floor. He picked him up and carried him to the bed. Prior to episode pt states he felt weak but denies any dizziness, chest pain, palpitations, headaches, SOB, abd pain, N/V. As per wife pt developed some rhinorrhea and cough last night. No fevers or chills. No recent travel, no sick contacts. He was feeling lethargic and has not been eating since last night.   In ED pt given ceftriaxone and azithromycin for possible PNA. (18 Oct 2017 12:47)    10/19: Overnight patient has been bradycardic; Patient is requesting transfer to [facility] under Dr Landrum who is his cardiologist for continuity of care.  Patient has no complaints other than a cough.   10/20: yesterday transfer was not initiated by patients cardiologist. Todayspoke with patients cardio, Dr Sue landrum and the transfer center and awaiting bed.  Over night patient had frequent pauses on tele. vitals have been stable   10/21:  transfer center called and patient will only going on monday;   Patient has no complaints. denies any dyspnea or CP  10/22: this morning patient was dizzy, BP was borderline low and Patients Vasotec was decreased; Hes voiding well.  10/23:  Patient has no complaints. Still awaiting a bed at [place]. tele continues to have bradycardia with frequent pauses <3sec      #Syncope 2ndry to bradycardia with frequent pauses and Pneumonia   - continue tele -  AFIB, evidence of AIVR, multiple < 3 sec pause   - EKG with multiple PVCs, ST-T wave abnormalities  - CT head/C-spine - Neg  - continue ASA, BB, statin  - troponin X 3 negative  - cardio consult appreciated - If patient doesn't go today to [place], will need cardiac cath and afterwards will need EPS for possible PPM/ICD.  - EP consult appreciated     #RLL PNA - community acquired with superimposed Viral infection  - +RVP with rhinovirus  - CT chest Large consolidation in the right lower lobe.  - will treat for gram negative bacteria  - continue ceftriaxone#6  S/P azithromycin  - f/u cultures     #Systolic CHF - compensated  -echo - Moderate global LV hypocontactility; Estimated left ventricular ejection fraction is 40 %.  -Started on ACEI  -Monitor I&o,   -continue BB, metolazone, aldactone   -I&O, Daily weights    #A. fib  - currently bradycardia  - Hold digoxin and continue coreg  - dig level WNL  - continue xarelto    #CKD stage 3  - unknown baseline Cr (Cr 1.8 5/2016)  - monitor renal panel    #CAD s/p CABG and stents  - no active chest pain  - continue ASA, BB, statin  #DM2  - HbA1c 7.9  - hold metformin  - ISS  - diabetic diet    #HTN  - continue home meds    #HLD  - continue statin    #DVT prophylaxis  - on xarelto    *Discharge Instructions/Follow Up/Pending Labs:  -Transfer patient to [facility] under Dr sue Landrum; Discussed with Dr landrum and transfer center, awaiting for bed.  transfer consent done.  If patient doesn't go today to [place], will need cardiac cath and afterwards will need EPS for possible PPM/ICD.    Total time: 45 minutes

## 2017-10-19 NOTE — DISCHARGE NOTE ADULT - ADDITIONAL INSTRUCTIONS
*Discharge Instructions/Follow Up/Pending Labs:  -Transfer patient to [facility] under Dr Damon;  transfer consent done. *Discharge Instructions/Follow Up/Pending Labs:  -Transfer patient to [facility] under Dr sue Landrum; Discussed with Dr landrum and [facility], awaiting for bed;  transfer consent done.

## 2017-10-19 NOTE — PROGRESS NOTE ADULT - SUBJECTIVE AND OBJECTIVE BOX
HOSPITALIST PROGRESS NOTE:  SUBJECTIVE:  PCP: All Physicians from [facility]  PMD: Gordo Joseph  Cardio: Dr. Clark  Pulm: Dr. Arroyo    Chief Complaint: Patient is a 77y old  Male who presents with a chief complaint of I have a spot on my right lung that's gotten bigger, so I'm having part of the right lung removed. (18 Oct 2017 15:06)    HPI:  78 yo male with PMH of CAD s/p CABG and stents, CHF (unknown EF) HTN, HLD, DM2, prostate CA, h/o lung malignancy s/p radiation, h/o RUL lobectomy for recurrent PNA, A. fib on xarelto brought in by EMS for episode of syncope. Pt does not recall the whole event. As per wife at bedside pt was brushing his teeth and putting in his dentures when she heard a loud noise from the bathroom. Patients son ran into the bathroom and found his father on the floor. He picked him up and carried him to the bed. Prior to episode pt states he felt weak but denies any dizziness, chest pain, palpitations, headaches, SOB, abd pain, N/V. As per wife pt developed some rhinorrhea and cough last night. No fevers or chills. No recent travel, no sick contacts. He was feeling lethargic and has not been eating since last night.   In ED pt given ceftriaxone and azithromycin for possible PNA. (18 Oct 2017 12:47)    10/19: Overnight patient has been bradycardic; Patient is requesting transfer to [facility] under Dr Lam who is his cardiologist for continuity of care.  Patient has no complaints other than a cough.     Allergies:  morphine (Hypotension)  oxycodone (Other)    REVIEW OF SYSTEMS:  See HPI. All other review of systems is negative unless indicated above.     OBJECTIVE  Physical Exam:  Vital Signs:    Vital Signs Last 24 Hrs  T(C): 3 (19 Oct 2017 11:32), Max: 37 (19 Oct 2017 05:25)  T(F): 37.4 (19 Oct 2017 11:32), Max: 98.6 (19 Oct 2017 05:25)  HR: 55 (19 Oct 2017 11:32) (55 - 66)  BP: 138/47 (19 Oct 2017 11:32) (117/65 - 142/62)  BP(mean): 78 (19 Oct 2017 05:25) (78 - 78)  RR: 18 (19 Oct 2017 11:32) (18 - 118)  SpO2: 99% (19 Oct 2017 11:32) (95% - 100%)  I&O's Summary      Constitutional: NAD, awake and alert, well-developed  Neurological: AAO x 3, no focal deficits  HEENT: PERRLA, EOMI, MMM  Neck: Soft and supple, No LAD, No JVD  Respiratory: Breath sounds are clear bilaterally, No wheezing, rales or rhonchi  Cardiovascular: S1 and S2, regular rate and rhythm; no Murmurs, gallops or rubs  Gastrointestinal: Bowel Sounds present, soft, nontender, nondistended, no guarding, no rebound tenderness  Back: No CVA tenderness   Extremities: No peripheral edema  Vascular: 2+ peripheral pulses  Musculoskeletal: 5/5 strength b/l upper and lower extremities  Skin: No rashes  Breast: Deferred  Rectal: Deferred    MEDICATIONS  (STANDING):  aspirin enteric coated 81 milliGRAM(s) Oral daily  atorvastatin 20 milliGRAM(s) Oral at bedtime  azithromycin  IVPB 500 milliGRAM(s) IV Intermittent every 24 hours  carvedilol 6.25 milliGRAM(s) Oral every 12 hours  cefTRIAXone   IVPB 1 Gram(s) IV Intermittent every 24 hours  dextrose 5%. 1000 milliLiter(s) (50 mL/Hr) IV Continuous <Continuous>  dextrose 50% Injectable 12.5 Gram(s) IV Push once  dextrose 50% Injectable 25 Gram(s) IV Push once  dextrose 50% Injectable 25 Gram(s) IV Push once  digoxin     Tablet 0.125 milliGRAM(s) Oral daily  insulin lispro (HumaLOG) corrective regimen sliding scale   SubCutaneous three times a day before meals  insulin lispro (HumaLOG) corrective regimen sliding scale   SubCutaneous at bedtime  metolazone 2.5 milliGRAM(s) Oral daily  rivaroxaban 15 milliGRAM(s) Oral every 24 hours  spironolactone 25 milliGRAM(s) Oral daily  tamsulosin 0.4 milliGRAM(s) Oral at bedtime      LABS: All Labs Reviewed:                        12.8   6.8   )-----------( 194      ( 19 Oct 2017 05:47 )             37.0     10-19    136  |  100  |  15  ----------------------------<  141<H>  4.2   |  27  |  1.12    Ca    9.2      19 Oct 2017 05:47  Phos  2.6     10-19  Mg     1.4     10-19    TPro  7.0  /  Alb  3.5  /  TBili  0.9  /  DBili  x   /  AST  15  /  ALT  14  /  AlkPhos  54  10-18    PT/INR - ( 18 Oct 2017 11:00 )   PT: 15.1 sec;   INR: 1.39 ratio         PTT - ( 18 Oct 2017 11:00 )  PTT:32.0 sec  CARDIAC MARKERS ( 18 Oct 2017 20:07 )  0.044 ng/mL / x     / x     / x     / x      CARDIAC MARKERS ( 18 Oct 2017 13:53 )  0.024 ng/mL / x     / x     / x     / x      CARDIAC MARKERS ( 18 Oct 2017 11:00 )  0.023 ng/mL / x     / x     / x     / x          RADIOLOGY/EKG:    < from: Xray Chest 1 View AP/PA. (10.18.17 @ 11:14) >    IMPRESSION:    Findings consistent with mild to moderate right-sided pneumonia or   pneumonitis. Repeat radiography suggested in 4 weeks    < end of copied text >    < from: CT Head No Cont (10.18.17 @ 11:32) >    Impression:  1. no acute findings.        < end of copied text >    < from: CT Cervical Spine No Cont (10.18.17 @ 11:32) >    IMPRESSION:    No acute findings      < end of copied text >    < from: CT Chest No Cont (10.18.17 @ 13:35) >    IMPRESSION:     Large consolidation in the right lower lobe. Follow-up to complete   resolution with radiographic follow-up in 4 weeks.      < end of copied text >

## 2017-10-19 NOTE — CONSULT NOTE ADULT - ASSESSMENT
Syncope  -evidence of AIVR, < 3 sec pause   -obtain echo to ascertain EF  -further recommendations to follow  -continue to monitor on telemetry  -treat active infection    CHF (EF unknown)  -appears euvolemic  -await results of echocardiogram  -currently on digoxin, coreg, spironolactone, metolazone  -AV zhang agents needed for CHF and CAD    CAD s/p stents/CABG  -no active chest pain  -cont ASA, beta blocker and statin    Atrial fib  -rate controlled  -currently on xarelto      Will discuss with attending    Thank you for the consultation and allowing us to participate in the care of Mr. Conroy Syncope  -evidence of AIVR, < 3 sec pause   -obtain echo to ascertain EF  -further recommendations to follow +/_ EP Study   -continue to monitor on telemetry  -treat active infection    CHF (EF unknown)  -appears euvolemic  -await results of echocardiogram  -currently on digoxin, coreg, spironolactone, metolazone  -AV zhang agents needed for CHF and CAD  - Would discontinue digoxin it has limited value in CHF and may be responsible for his bradycardia.     CAD s/p stents/CABG  -no active chest pain  -cont ASA, beta blocker and statin    Atrial fib  -rate controlled  -currently on xarelto      Will discuss with attending    Thank you for the consultation and allowing us to participate in the care of Mr. Conroy

## 2017-10-19 NOTE — DISCHARGE NOTE ADULT - PROVIDER TOKENS
FREE:[LAST:[PCP/cardio - Dr Damon],PHONE:[(   )    -],FAX:[(   )    -]] FREE:[LAST:[PCP/Cardio- Dr Lam],PHONE:[(   )    -],FAX:[(   )    -]]

## 2017-10-19 NOTE — DISCHARGE NOTE ADULT - PLAN OF CARE
#Syncope 2ndry to bradycardia and Pneumonia -Transfer patient to [facility] under Dr Damon;  transfer consent done. RLL PNA - community acquired with superimposed Viral infection - +RVP with rhinovirus  - CT chest Large consolidation in the right lower lobe.  - will treat for gram negative bacteria  - continue ceftriaxone and azithromycin -Transfer patient to [facility] under Dr Damon;  transfer consent done.  -Discussed with EP - hold Digoxin for Pauses <3 and Bradycardia in the 40's and continue Coreg -Transfer patient to [facility] under Dr Damon;  transfer consent done.  -Discussed with EP - hold Digoxin for Pauses <3 and Bradycardia in the 40's and continue Coreg  echo - Moderate global LV hypocontactility; Estimated left ventricular ejection fraction is 40 %. -Transfer patient to [facility] under Dr Damon;  transfer consent done.  -Discussed with EP - hold Digoxin for Pauses <3 and Bradycardia in the 40's and continue Coreg  echo - Moderate global LV hypocontactility; Estimated left ventricular ejection fraction is 40 %. Started on enalapril - +RVP with rhinovirus  - CT chest Large consolidation in the right lower lobe.  - will treat for gram negative bacteria  - continue ceftriaxone #6 S/P 3 days of azithromycin - +RVP with rhinovirus  - CT chest Large consolidation in the right lower lobe.  - will treat for gram negative bacteria  - S/P ceftriaxone #7 S/P 3 days of azithromycin; Switched to ceftin

## 2017-10-19 NOTE — DISCHARGE NOTE ADULT - CARE PLAN
Principal Discharge DX:	Syncope, unspecified syncope type  Goal:	#Syncope 2ndry to bradycardia and Pneumonia  Instructions for follow-up, activity and diet:	-Transfer patient to [facility] under Dr Damon;  transfer consent done.  Goal:	RLL PNA - community acquired with superimposed Viral infection  Instructions for follow-up, activity and diet:	- +RVP with rhinovirus  - CT chest Large consolidation in the right lower lobe.  - will treat for gram negative bacteria  - continue ceftriaxone and azithromycin Principal Discharge DX:	Syncope, unspecified syncope type  Goal:	#Syncope 2ndry to bradycardia and Pneumonia  Instructions for follow-up, activity and diet:	-Transfer patient to [facility] under Dr Damon;  transfer consent done.  -Discussed with EP - hold Digoxin for Pauses <3 and Bradycardia in the 40's and continue Coreg  Goal:	RLL PNA - community acquired with superimposed Viral infection  Instructions for follow-up, activity and diet:	- +RVP with rhinovirus  - CT chest Large consolidation in the right lower lobe.  - will treat for gram negative bacteria  - continue ceftriaxone and azithromycin Principal Discharge DX:	Syncope, unspecified syncope type  Goal:	#Syncope 2ndry to bradycardia and Pneumonia  Instructions for follow-up, activity and diet:	-Transfer patient to [facility] under Dr Damon;  transfer consent done.  -Discussed with EP - hold Digoxin for Pauses <3 and Bradycardia in the 40's and continue Coreg  echo - Moderate global LV hypocontactility; Estimated left ventricular ejection fraction is 40 %.  Goal:	RLL PNA - community acquired with superimposed Viral infection  Instructions for follow-up, activity and diet:	- +RVP with rhinovirus  - CT chest Large consolidation in the right lower lobe.  - will treat for gram negative bacteria  - continue ceftriaxone and azithromycin Principal Discharge DX:	Syncope, unspecified syncope type  Goal:	#Syncope 2ndry to bradycardia and Pneumonia  Instructions for follow-up, activity and diet:	-[facility];  transfer consent done.  -Discussed with EP - hold Digoxin for Pauses <3 and Bradycardia in the 40's and continue Coreg  echo - Moderate global LV hypocontactility; Estimated left ventricular ejection fraction is 40 %. Started on enalapril  Goal:	RLL PNA - community acquired with superimposed Viral infection  Instructions for follow-up, activity and diet:	- +RVP with rhinovirus  - CT chest Large consolidation in the right lower lobe.  - will treat for gram negative bacteria  - continue ceftriaxone and azithromycin Principal Discharge DX:	Syncope, unspecified syncope type  Goal:	#Syncope 2ndry to bradycardia and Pneumonia  Instructions for follow-up, activity and diet:	-[facility];  transfer consent done.  -Discussed with EP - hold Digoxin for Pauses <3 and Bradycardia in the 40's and continue Coreg  echo - Moderate global LV hypocontactility; Estimated left ventricular ejection fraction is 40 %. Started on enalapril  Goal:	RLL PNA - community acquired with superimposed Viral infection  Instructions for follow-up, activity and diet:	- +RVP with rhinovirus  - CT chest Large consolidation in the right lower lobe.  - will treat for gram negative bacteria  - continue ceftriaxone #6 S/P 3 days of azithromycin Principal Discharge DX:	Syncope, unspecified syncope type  Goal:	#Syncope 2ndry to bradycardia and Pneumonia  Instructions for follow-up, activity and diet:	-[facility];  transfer consent done.  -Discussed with EP - hold Digoxin for Pauses <3 and Bradycardia in the 40's and continue Coreg  echo - Moderate global LV hypocontactility; Estimated left ventricular ejection fraction is 40 %. Started on enalapril  Goal:	RLL PNA - community acquired with superimposed Viral infection  Instructions for follow-up, activity and diet:	- +RVP with rhinovirus  - CT chest Large consolidation in the right lower lobe.  - will treat for gram negative bacteria  - S/P ceftriaxone #7 S/P 3 days of azithromycin; Switched to ceftin

## 2017-10-19 NOTE — DISCHARGE NOTE ADULT - MEDICATION SUMMARY - MEDICATIONS TO TAKE
I will START or STAY ON the medications listed below when I get home from the hospital:    spironolactone 25 mg oral tablet  -- 1 tab(s) by mouth once a day  -- Indication: For CHF    aspirin 81 mg oral delayed release tablet  -- 1 tab(s) by mouth once a day  -- Indication: For CAD    acetaminophen 325 mg oral tablet  -- 2 tab(s) by mouth every 6 hours, As needed, Mild Pain (1 - 3)  -- Indication: For Pain or fever    tamsulosin 0.4 mg oral capsule  -- 1 cap(s) by mouth once a day (at bedtime)  -- Indication: For BPH    Digitek 125 mcg (0.125 mg) oral tablet  -- 1 tab(s) by mouth once a day  -- Indication: For AFIB    rivaroxaban 10 mg oral tablet  -- 1 tab(s) by mouth once a day  -- Indication: For AFIB    insulin lispro 100 units/mL subcutaneous solution  --  subcutaneous 3 times a day (before meals); 1 Unit(s) if Glucose 151 - 200  2 Unit(s) if Glucose 201 - 250  3 Unit(s) if Glucose 251 - 300  4 Unit(s) if Glucose 301 - 350  5 Unit(s) if Glucose 351 - 400  6 Unit(s) if Glucose Greater Than 400  -- Indication: For DM    insulin lispro 100 units/mL subcutaneous solution  --  subcutaneous once a day (at bedtime); 0 Unit(s) if Glucose 0 - 250  1 Unit(s) if Glucose 251 - 300  2 Unit(s) if Glucose 301 - 350  3 Unit(s) if Glucose 351 - 400  4 Unit(s) if Glucose Greater Than 400  -- Indication: For DM    atorvastatin 20 mg oral tablet  -- 1 tab(s) by mouth once a day  -- Indication: For Hyperlipidemia    carvedilol 6.25 mg oral tablet  -- 1 tab(s) by mouth 2 times a day  -- Indication: For CAD    cefTRIAXone  -- 1 gram(s) intravenous once a day  -- Indication: For Pneumonia of right lung due to infectious organism, unspecified part of lung    metOLazone 2.5 mg oral tablet  -- 1 tab(s) by mouth once a day  -- Indication: For CHF    azithromycin 500 mg intravenous injection  -- 500 milligram(s) intravenous every 24 hours  -- Indication: For Pneumonia of right lung due to infectious organism, unspecified part of lung I will START or STAY ON the medications listed below when I get home from the hospital:    spironolactone 25 mg oral tablet  -- 1 tab(s) by mouth once a day  -- Indication: For CHF    aspirin 81 mg oral delayed release tablet  -- 1 tab(s) by mouth once a day  -- Indication: For CAD    acetaminophen 325 mg oral tablet  -- 2 tab(s) by mouth every 6 hours, As needed, Mild Pain (1 - 3)  -- Indication: For Pain or fever    tamsulosin 0.4 mg oral capsule  -- 1 cap(s) by mouth once a day (at bedtime)  -- Indication: For BPH    Digitek 125 mcg (0.125 mg) oral tablet  -- 1 tab(s) by mouth once a day  -- Indication: For AFIB    rivaroxaban 10 mg oral tablet  -- 1 tab(s) by mouth once a day  -- Indication: For AFIB    insulin lispro 100 units/mL subcutaneous solution  --  subcutaneous 3 times a day (before meals); 1 Unit(s) if Glucose 151 - 200  2 Unit(s) if Glucose 201 - 250  3 Unit(s) if Glucose 251 - 300  4 Unit(s) if Glucose 301 - 350  5 Unit(s) if Glucose 351 - 400  6 Unit(s) if Glucose Greater Than 400  -- Indication: For DM    insulin lispro 100 units/mL subcutaneous solution  --  subcutaneous once a day (at bedtime); 0 Unit(s) if Glucose 0 - 250  1 Unit(s) if Glucose 251 - 300  2 Unit(s) if Glucose 301 - 350  3 Unit(s) if Glucose 351 - 400  4 Unit(s) if Glucose Greater Than 400  -- Indication: For DM    atorvastatin 20 mg oral tablet  -- 1 tab(s) by mouth once a day  -- Indication: For Hyperlipidemia    carvedilol 6.25 mg oral tablet  -- 1 tab(s) by mouth 2 times a day  -- Indication: For CAD    cefTRIAXone  -- 1 gram(s) intravenous once a day  -- Indication: For Pneumonia of right lung due to infectious organism, unspecified part of lung    metOLazone 2.5 mg oral tablet  -- 1 tab(s) by mouth once a day  -- Indication: For CHF    azithromycin 500 mg oral tablet  -- 1 tab(s) by mouth once a day  -- Indication: For PNA I will START or STAY ON the medications listed below when I get home from the hospital:    spironolactone 25 mg oral tablet  -- 1 tab(s) by mouth once a day  -- Indication: For CHF    aspirin 81 mg oral delayed release tablet  -- 1 tab(s) by mouth once a day  -- Indication: For CAD    acetaminophen 325 mg oral tablet  -- 2 tab(s) by mouth every 6 hours, As needed, Mild Pain (1 - 3)  -- Indication: For Pain or fever    enalapril 2.5 mg oral tablet  -- 1 tab(s) by mouth every 12 hours  -- Indication: For CHF    tamsulosin 0.4 mg oral capsule  -- 1 cap(s) by mouth once a day (at bedtime)  -- Indication: For BPH    rivaroxaban 10 mg oral tablet  -- 1 tab(s) by mouth once a day  -- Indication: For AFIB    insulin lispro 100 units/mL subcutaneous solution  --  subcutaneous 3 times a day (before meals); 1 Unit(s) if Glucose 151 - 200  2 Unit(s) if Glucose 201 - 250  3 Unit(s) if Glucose 251 - 300  4 Unit(s) if Glucose 301 - 350  5 Unit(s) if Glucose 351 - 400  6 Unit(s) if Glucose Greater Than 400  -- Indication: For DM    insulin lispro 100 units/mL subcutaneous solution  --  subcutaneous once a day (at bedtime); 0 Unit(s) if Glucose 0 - 250  1 Unit(s) if Glucose 251 - 300  2 Unit(s) if Glucose 301 - 350  3 Unit(s) if Glucose 351 - 400  4 Unit(s) if Glucose Greater Than 400  -- Indication: For DM    atorvastatin 20 mg oral tablet  -- 1 tab(s) by mouth once a day  -- Indication: For Hyperlipidemia    carvedilol 6.25 mg oral tablet  -- 1 tab(s) by mouth 2 times a day  -- Indication: For CAD    cefTRIAXone  -- 1 gram(s) intravenous once a day  -- Indication: For Pneumonia of right lung due to infectious organism, unspecified part of lung    metOLazone 2.5 mg oral tablet  -- 1 tab(s) by mouth once a day  -- Indication: For CHF    guaiFENesin 100 mg/5 mL oral liquid  -- 10 milliliter(s) by mouth every 6 hours, As needed, Cough  -- Indication: For PRN for COugh I will START or STAY ON the medications listed below when I get home from the hospital:    spironolactone 25 mg oral tablet  -- 1 tab(s) by mouth once a day  -- Indication: For CHF    aspirin 81 mg oral delayed release tablet  -- 1 tab(s) by mouth once a day  -- Indication: For CAD    acetaminophen 325 mg oral tablet  -- 2 tab(s) by mouth every 6 hours, As needed, Mild Pain (1 - 3)  -- Indication: For Pain or fever    enalapril 2.5 mg oral tablet  -- 1 tab(s) by mouth every 12 hours  -- Indication: For CHF    tamsulosin 0.4 mg oral capsule  -- 1 cap(s) by mouth once a day (at bedtime)  -- Indication: For BPH    rivaroxaban 10 mg oral tablet  -- 1 tab(s) by mouth once a day  -- Indication: For AFIB    insulin lispro 100 units/mL subcutaneous solution  --  subcutaneous 3 times a day (before meals); 1 Unit(s) if Glucose 151 - 200  2 Unit(s) if Glucose 201 - 250  3 Unit(s) if Glucose 251 - 300  4 Unit(s) if Glucose 301 - 350  5 Unit(s) if Glucose 351 - 400  6 Unit(s) if Glucose Greater Than 400  -- Indication: For DM    insulin lispro 100 units/mL subcutaneous solution  --  subcutaneous once a day (at bedtime); 0 Unit(s) if Glucose 0 - 250  1 Unit(s) if Glucose 251 - 300  2 Unit(s) if Glucose 301 - 350  3 Unit(s) if Glucose 351 - 400  4 Unit(s) if Glucose Greater Than 400  -- Indication: For DM    atorvastatin 20 mg oral tablet  -- 1 tab(s) by mouth once a day  -- Indication: For Hyperlipidemia    carvedilol 6.25 mg oral tablet  -- 1 tab(s) by mouth 2 times a day  -- Indication: For CAD    cefuroxime 500 mg oral tablet  -- 1 tab(s) by mouth every 12 hours  -- Indication: For PNA    metOLazone 2.5 mg oral tablet  -- 1 tab(s) by mouth once a day  -- Indication: For CHF    guaiFENesin 100 mg/5 mL oral liquid  -- 10 milliliter(s) by mouth every 6 hours, As needed, Cough  -- Indication: For PRN for COugh    lactobacillus acidophilus oral capsule  -- 1 tab(s) by mouth 2 times a day  -- Indication: For to take with ABX

## 2017-10-19 NOTE — CONSULT NOTE ADULT - ASSESSMENT
patient with syncope with signficant cardiac risk factors  1-CHF-get ECHO; patient needs coreg and digoxin  2-EPS-needs EP study; possible PPM or ICD vs change in medications.

## 2017-10-19 NOTE — DISCHARGE NOTE ADULT - OTHER SIGNIFICANT FINDINGS
LABS: All Labs Reviewed:                        12.8   6.8   )-----------( 194      ( 19 Oct 2017 05:47 )             37.0     10-19    136  |  100  |  15  ----------------------------<  141<H>  4.2   |  27  |  1.12    Ca    9.2      19 Oct 2017 05:47  Phos  2.6     10-19  Mg     1.4     10-19    TPro  7.0  /  Alb  3.5  /  TBili  0.9  /  DBili  x   /  AST  15  /  ALT  14  /  AlkPhos  54  10-18    PT/INR - ( 18 Oct 2017 11:00 )   PT: 15.1 sec;   INR: 1.39 ratio         PTT - ( 18 Oct 2017 11:00 )  PTT:32.0 sec  CARDIAC MARKERS ( 18 Oct 2017 20:07 )  0.044 ng/mL / x     / x     / x     / x      CARDIAC MARKERS ( 18 Oct 2017 13:53 )  0.024 ng/mL / x     / x     / x     / x      CARDIAC MARKERS ( 18 Oct 2017 11:00 )  0.023 ng/mL / x     / x     / x     / x          RADIOLOGY/EKG:    < from: Xray Chest 1 View AP/PA. (10.18.17 @ 11:14) >    IMPRESSION:    Findings consistent with mild to moderate right-sided pneumonia or   pneumonitis. Repeat radiography suggested in 4 weeks    < end of copied text >    < from: CT Head No Cont (10.18.17 @ 11:32) >    Impression:  1. no acute findings.        < end of copied text >    < from: CT Cervical Spine No Cont (10.18.17 @ 11:32) >    IMPRESSION:    No acute findings      < end of copied text >    < from: CT Chest No Cont (10.18.17 @ 13:35) >    IMPRESSION:     Large consolidation in the right lower lobe. Follow-up to complete   resolution with radiographic follow-up in 4 weeks.      < end of copied text > LABS: All Labs Reviewed:                        12.8   6.8   )-----------( 194      ( 19 Oct 2017 05:47 )             37.0     10-19    136  |  100  |  15  ----------------------------<  141<H>  4.2   |  27  |  1.12    Ca    9.2      19 Oct 2017 05:47  Phos  2.6     10-19  Mg     1.4     10-19    TPro  7.0  /  Alb  3.5  /  TBili  0.9  /  DBili  x   /  AST  15  /  ALT  14  /  AlkPhos  54  10-18    PT/INR - ( 18 Oct 2017 11:00 )   PT: 15.1 sec;   INR: 1.39 ratio         PTT - ( 18 Oct 2017 11:00 )  PTT:32.0 sec  CARDIAC MARKERS ( 18 Oct 2017 20:07 )  0.044 ng/mL / x     / x     / x     / x      CARDIAC MARKERS ( 18 Oct 2017 13:53 )  0.024 ng/mL / x     / x     / x     / x      CARDIAC MARKERS ( 18 Oct 2017 11:00 )  0.023 ng/mL / x     / x     / x     / x          RADIOLOGY/EKG:    < from: Xray Chest 1 View AP/PA. (10.18.17 @ 11:14) >    IMPRESSION:    Findings consistent with mild to moderate right-sided pneumonia or   pneumonitis. Repeat radiography suggested in 4 weeks    < end of copied text >    < from: CT Head No Cont (10.18.17 @ 11:32) >    Impression:  1. no acute findings.        < end of copied text >    < from: CT Cervical Spine No Cont (10.18.17 @ 11:32) >    IMPRESSION:    No acute findings      < end of copied text >    < from: CT Chest No Cont (10.18.17 @ 13:35) >    IMPRESSION:     Large consolidation in the right lower lobe. Follow-up to complete   resolution with radiographic follow-up in 4 weeks.      < end of copied text >    < from: Transthoracic Echocardiogram (10.19.17 @ 10:19) >     Impression     Summary     Moderate (2+) mitral regurgitation is present.   Mild mitral annular calcification is present.   The left atrium is moderately dilated.   The left ventricle is normal in size, wall thickness. Moderate global LV   hypocontactility   Estimated left ventricular ejection fraction is 40 %.     Signature    < end of copied text >

## 2017-10-19 NOTE — CONSULT NOTE ADULT - SUBJECTIVE AND OBJECTIVE BOX
HISTORY OF PRESENT ILLNESS:  78 yo male with PMH of CAD s/p CABG and stents, CHF (unknown EF) HTN, HLD, DM2, prostate CA, h/o lung malignancy s/p radiation, h/o RUL lobectomy for recurrent PNA, A. fib on xarelto brought in by EMS for episode of syncope. Patient reports that he got up to go to the bathroom, while in the bathroom he felt lightheaded like he was going to pass out, so he went back in bed. He laid down for maybe 15 mins and felt better. Shortly thereafter, he proceeded to go back to the bathroom and while he was brushing his teeth and putting in his dentures, he felt weak and "woozy" and passed out. He does not recall the events that followed, but does remember hearing his son calling out his name. His son picked him up and carried him to the bed. Patient denies any dizziness, chest pain/discomfort, palpitations, SOB headache, visual disturbance, nausea, vomiting, abd pain, and diarrhea. Patient admitted to chills and cough that started Tuesday. No fevers. He also reported feeling weak and had poor oral intake last night. He reports that his son had the flu about a week ago. No recent travel.     In the ED, patient was given ceftriaxone and azithromycin for possible PNA.       PAST MEDICAL & SURGICAL HISTORY:  CAD S/P coronary artery stents (2002, 2003) and CABG x 3 (1997)  Left carotid stenosis s/p carotid endarterectomy, left carotid stent 2000  HLD (hyperlipidemia)  HTN (hypertension)  DM (diabetes mellitus)  Osteoarthritis  Lung cancer S/P lobectomy of lung: right upper 1998  Prostate Ca  BPH (benign prostatic hyperplasia) S/P TURP  Cataracts, bilateral    CURRENT MEDICATIONS:  aspirin enteric coated 81 milliGRAM(s) Oral daily  atorvastatin 20 milliGRAM(s) Oral at bedtime  azithromycin  IVPB 500 milliGRAM(s) IV Intermittent every 24 hours  carvedilol 6.25 milliGRAM(s) Oral every 12 hours  cefTRIAXone   IVPB 1 Gram(s) IV Intermittent every 24 hours  dextrose 5%. 1000 milliLiter(s) (50 mL/Hr) IV Continuous <Continuous>  dextrose 50% Injectable 12.5 Gram(s) IV Push once  dextrose 50% Injectable 25 Gram(s) IV Push once  dextrose 50% Injectable 25 Gram(s) IV Push once  digoxin     Tablet 0.125 milliGRAM(s) Oral daily  insulin lispro (HumaLOG) corrective regimen sliding scale   SubCutaneous three times a day before meals  insulin lispro (HumaLOG) corrective regimen sliding scale   SubCutaneous at bedtime  metolazone 2.5 milliGRAM(s) Oral daily  rivaroxaban 15 milliGRAM(s) Oral every 24 hours  spironolactone 25 milliGRAM(s) Oral daily  tamsulosin 0.4 milliGRAM(s) Oral at bedtime  melatonin 3 milliGRAM (s) Oral at bedtime PRN  acetaminophen 60 milliGRAM(s) Oral every 6 hours PRN      ALLERGIES:  morphine (Hypotension)  oxycodone (Other)    SOCIAL HISTORY: Former heavy smoker, quit in 1988. Rare ETOH. No illicit drugs    FAMILY HISTORY: No family history of CAD or SCD. + history of cancer    REVIEW OF SYSTEMS:  CONSTITUTIONAL: positive for generalized weakness, chills. No fever  HEENT: No headache, visual changes, tinnitus, ear pain, eye pain or throat pain   RESPIRATORY: Positive cough. No wheezing, hemoptysis, shortness of breath  CARDIOVASCULAR: Positive for syncope. No chest pain/discomfort, palpitations, orthopnea, lower extremity edema  GASTROINTESTINAL: No abdominal or epigastric pain. No nausea, vomiting, diarrhea or constipation. No hematemesis, melena or hematochezia.  ENDOCRINE: Positive BPH. No heat/cold intolerance. No dysuria or hematuria  MUSCULOSKELETAL: No unusual muscle or joint pains  NEUROLOGICAL: No numbness, tingling. No dizziness.  INTEGUMENT: No unusual rashes or skin lesions. No pruritus  All other review of systems is negative unless indicated above    PHYSICAL EXAM:   Vitals: /65, HR 63, RR 18, O2 98%, T 98.6  Gen: no acute distress, well developed, speaking in complete sentences  HEENT: atraumatic, PERR, EOMI, MMM  Neck: supple, no significant JVD apprec  Lungs: good air movement b/l  Heart: S1 and S2, irregularly irregular  Abdomen: soft, nontender, nondistended, + bowel sounds  Extremities: No cyanosis or edema, + pedal pulses b/l  Neuro: AAO x 3, no focal deficits    EKG: atrial fib @ 63bpm, LAFB, IVCD, ST-T wave abnormality  Telemetry: HR 50-70s, PVC's, AIVR, pauses <3 secs  Labs and imaging reviewed. Hb 12.8, mag 1.4, dig level 1.5, trops neg x 3. Virus positive. Chest imaging showing R sided PNA or pneumonitis

## 2017-10-20 LAB
GLUCOSE BLDC GLUCOMTR-MCNC: 128 MG/DL — HIGH (ref 70–99)
GLUCOSE BLDC GLUCOMTR-MCNC: 141 MG/DL — HIGH (ref 70–99)
GLUCOSE BLDC GLUCOMTR-MCNC: 147 MG/DL — HIGH (ref 70–99)
GLUCOSE BLDC GLUCOMTR-MCNC: 155 MG/DL — HIGH (ref 70–99)

## 2017-10-20 RX ORDER — AZITHROMYCIN 500 MG/1
500 TABLET, FILM COATED ORAL DAILY
Qty: 0 | Refills: 0 | Status: DISCONTINUED | OUTPATIENT
Start: 2017-10-21 | End: 2017-10-24

## 2017-10-20 RX ORDER — AZITHROMYCIN 500 MG/1
1 TABLET, FILM COATED ORAL
Qty: 0 | Refills: 0 | DISCHARGE
Start: 2017-10-20

## 2017-10-20 RX ADMIN — Medication 200 MILLIGRAM(S): at 21:21

## 2017-10-20 RX ADMIN — TAMSULOSIN HYDROCHLORIDE 0.4 MILLIGRAM(S): 0.4 CAPSULE ORAL at 21:21

## 2017-10-20 RX ADMIN — ATORVASTATIN CALCIUM 20 MILLIGRAM(S): 80 TABLET, FILM COATED ORAL at 21:21

## 2017-10-20 RX ADMIN — SPIRONOLACTONE 25 MILLIGRAM(S): 25 TABLET, FILM COATED ORAL at 05:13

## 2017-10-20 RX ADMIN — Medication 81 MILLIGRAM(S): at 11:51

## 2017-10-20 RX ADMIN — CARVEDILOL PHOSPHATE 6.25 MILLIGRAM(S): 80 CAPSULE, EXTENDED RELEASE ORAL at 05:13

## 2017-10-20 RX ADMIN — RIVAROXABAN 15 MILLIGRAM(S): KIT at 18:21

## 2017-10-20 RX ADMIN — CARVEDILOL PHOSPHATE 6.25 MILLIGRAM(S): 80 CAPSULE, EXTENDED RELEASE ORAL at 18:22

## 2017-10-20 RX ADMIN — CEFTRIAXONE 100 GRAM(S): 500 INJECTION, POWDER, FOR SOLUTION INTRAMUSCULAR; INTRAVENOUS at 10:33

## 2017-10-20 NOTE — PROGRESS NOTE ADULT - SUBJECTIVE AND OBJECTIVE BOX
HOSPITALIST PROGRESS NOTE:  SUBJECTIVE:  PCP: [facility] from [facility]  PMD: Gordo Joseph  Cardio: Dr. Clark  Pulm: Dr. Arroyo    Chief Complaint: Patient is a 77y old  Male who presents with a chief complaint of I have a spot on my right lung that's gotten bigger, so I'm having part of the right lung removed. (18 Oct 2017 15:06)    HPI:  76 yo male with PMH of CAD s/p CABG and stents, CHF (unknown EF) HTN, HLD, DM2, prostate CA, h/o lung malignancy s/p radiation, h/o RUL lobectomy for recurrent PNA, A. fib on xarelto brought in by EMS for episode of syncope. Pt does not recall the whole event. As per wife at bedside pt was brushing his teeth and putting in his dentures when she heard a loud noise from the bathroom. Patients son ran into the bathroom and found his father on the floor. He picked him up and carried him to the bed. Prior to episode pt states he felt weak but denies any dizziness, chest pain, palpitations, headaches, SOB, abd pain, N/V. As per wife pt developed some rhinorrhea and cough last night. No fevers or chills. No recent travel, no sick contacts. He was feeling lethargic and has not been eating since last night.   In ED pt given ceftriaxone and azithromycin for possible PNA. (18 Oct 2017 12:47)    10/19: Overnight patient has been bradycardic; Patient is requesting transfer to [facility] under Dr Landrum who is his cardiologist for continuity of care.  Patient has no complaints other than a cough.   10/20: yesterday transfer was not initiated by patients cardiologist. Todayspoke with patients cardio, Dr Amadou landrum and the transfer center and awaiting bed.  Over night patient had frequent pauses on tele. vitals have been stable     Allergies:  morphine (Hypotension)  oxycodone (Other)    REVIEW OF SYSTEMS:  See HPI. All other review of systems is negative unless indicated above.     OBJECTIVE  Physical Exam:  Vital Signs Last 24 Hrs  T(C): 37.1 (20 Oct 2017 10:38), Max: 37.1 (20 Oct 2017 04:44)  T(F): 98.8 (20 Oct 2017 10:38), Max: 98.8 (20 Oct 2017 04:44)  HR: 64 (20 Oct 2017 10:38) (54 - 64)  BP: 136/56 (20 Oct 2017 10:38) (124/50 - 145/43)  BP(mean): --  RR: 18 (20 Oct 2017 10:38) (18 - 18)  SpO2: 97% (20 Oct 2017 10:38) (96% - 99%)    Constitutional: NAD, awake and alert, well-developed  Neurological: AAO x 3, no focal deficits  HEENT: PERRLA, EOMI, MMM  Neck: Soft and supple, No LAD, No JVD  Respiratory: Breath sounds are clear bilaterally, No wheezing, rales or rhonchi  Cardiovascular: S1 and S2, regular rate and rhythm; no Murmurs, gallops or rubs  Gastrointestinal: Bowel Sounds present, soft, nontender, nondistended, no guarding, no rebound tenderness  Back: No CVA tenderness   Extremities: No peripheral edema  Vascular: 2+ peripheral pulses  Musculoskeletal: 5/5 strength b/l upper and lower extremities  Skin: No rashes  Breast: Deferred  Rectal: Deferred    MEDICATIONS  (STANDING):  aspirin enteric coated 81 milliGRAM(s) Oral daily  atorvastatin 20 milliGRAM(s) Oral at bedtime  azithromycin  IVPB 500 milliGRAM(s) IV Intermittent every 24 hours  carvedilol 6.25 milliGRAM(s) Oral every 12 hours  cefTRIAXone   IVPB 1 Gram(s) IV Intermittent every 24 hours  dextrose 5%. 1000 milliLiter(s) (50 mL/Hr) IV Continuous <Continuous>  dextrose 50% Injectable 12.5 Gram(s) IV Push once  dextrose 50% Injectable 25 Gram(s) IV Push once  dextrose 50% Injectable 25 Gram(s) IV Push once  digoxin     Tablet 0.125 milliGRAM(s) Oral daily  insulin lispro (HumaLOG) corrective regimen sliding scale   SubCutaneous three times a day before meals  insulin lispro (HumaLOG) corrective regimen sliding scale   SubCutaneous at bedtime  metolazone 2.5 milliGRAM(s) Oral daily  rivaroxaban 15 milliGRAM(s) Oral every 24 hours  spironolactone 25 milliGRAM(s) Oral daily  tamsulosin 0.4 milliGRAM(s) Oral at bedtime      LABS: All Labs Reviewed:                        12.8   6.8   )-----------( 194      ( 19 Oct 2017 05:47 )             37.0     10-19    136  |  100  |  15  ----------------------------<  141<H>  4.2   |  27  |  1.12    Ca    9.2      19 Oct 2017 05:47  Phos  2.6     10-19  Mg     1.4     10-19    TPro  7.0  /  Alb  3.5  /  TBili  0.9  /  DBili  x   /  AST  15  /  ALT  14  /  AlkPhos  54  10-18    PT/INR - ( 18 Oct 2017 11:00 )   PT: 15.1 sec;   INR: 1.39 ratio         PTT - ( 18 Oct 2017 11:00 )  PTT:32.0 sec  CARDIAC MARKERS ( 18 Oct 2017 20:07 )  0.044 ng/mL / x     / x     / x     / x      CARDIAC MARKERS ( 18 Oct 2017 13:53 )  0.024 ng/mL / x     / x     / x     / x      CARDIAC MARKERS ( 18 Oct 2017 11:00 )  0.023 ng/mL / x     / x     / x     / x          RADIOLOGY/EKG:    < from: Xray Chest 1 View AP/PA. (10.18.17 @ 11:14) >    IMPRESSION:    Findings consistent with mild to moderate right-sided pneumonia or   pneumonitis. Repeat radiography suggested in 4 weeks    < end of copied text >    < from: CT Head No Cont (10.18.17 @ 11:32) >    Impression:  1. no acute findings.        < end of copied text >    < from: CT Cervical Spine No Cont (10.18.17 @ 11:32) >    IMPRESSION:    No acute findings      < end of copied text >    < from: CT Chest No Cont (10.18.17 @ 13:35) >    IMPRESSION:     Large consolidation in the right lower lobe. Follow-up to complete   resolution with radiographic follow-up in 4 weeks.      < end of copied text >    < from: Transthoracic Echocardiogram (10.19.17 @ 10:19) >     Impression     Summary     Moderate (2+) mitral regurgitation is present.   Mild mitral annular calcification is present.   The left atrium is moderately dilated.   The left ventricle is normal in size, wall thickness. Moderate global LV   hypocontactility   Estimated left ventricular ejection fraction is 40 %.     Signature    < end of copied text >

## 2017-10-20 NOTE — PROGRESS NOTE ADULT - SUBJECTIVE AND OBJECTIVE BOX
CHIEF COMPLAINT: Patient is a 77y old  Male who presents with a chief complaint of I have a spot on my right lung that's gotten bigger, so I'm having part of the right lung removed. (19 Oct 2017 13:18)      HPI:  78 yo male with PMH of CAD s/p CABG and stents, CHF (unknown EF) HTN, HLD, DM2, prostate CA, h/o lung malignancy s/p radiation, h/o RUL lobectomy for recurrent PNA, A. fib on xarelto brought in by EMS for episode of syncope. Pt does not recall the whole event. As per wife at bedside pt was brushing his teeth and putting in his dentures when she heard a loud noise from the bathroom. Patients son ran into the bathroom and found his father on the floor. He picked him up and carried him to the bed. Prior to episode pt states he felt weak but denies any dizziness, chest pain, palpitations, headaches, SOB, abd pain, N/V. As per wife pt developed some rhinorrhea and cough last night. No fevers or chills. No recent travel, no sick contacts. He was feeling lethargic and has not been eating since last night.     In ED pt given ceftriaxone and azithromycin for possible PNA. (18 Oct 2017 12:47)    10/19-patient with significant cardiac risk factors, with telemetry-showing ventricular arrhythmia(AIVR), pauses of over 2.5sec.  Has R infiltrate-unsure if pneumonia or somthing more ominous    10/20-patient with significant pauses/NSVT and cardiomyopathy with global EF 40% and moderate MR.  PAtient requiring EP evaluation.  Patient refused w/u here and wants to go to [place] for w/u.  Wife was involved in transferring patient      PMHx: PAST MEDICAL & SURGICAL HISTORY:  Osteoarthritis  Lung cancer  DM (diabetes mellitus)  Cataracts, bilateral  Prostate ca  BPH (benign prostatic hyperplasia)  Carotid stenosis, left  CAD (coronary artery disease): S/P coronary artery stents 2002, 2003  HLD (hyperlipidemia)  HTN (hypertension)  S/P CABG x 3: 1997  S/P lobectomy of lung: right upper 1998  S/P TURP  Stented coronary artery: 2002, 2003  S/P carotid endarterectomy: left, S/P left carotid stent 2000      Allergies: Allergies    morphine (Hypotension)  oxycodone (Other)    Intolerances          REVIEW OF SYSTEMS:  RESPIRATORY: shortness of breath  CARDIOVASCULAR:  palpitations  GASTROINTESTINAL: No abdominal or epigastric pain. No nausea, vomiting, or hematemesis; No diarrhea or constipation. No melena or hematochezia.      Vital Signs Last 24 Hrs  T(C): 37.1 (20 Oct 2017 10:38), Max: 37.1 (20 Oct 2017 04:44)  T(F): 98.8 (20 Oct 2017 10:38), Max: 98.8 (20 Oct 2017 04:44)  HR: 64 (20 Oct 2017 10:38) (64 - 64)  BP: 136/56 (20 Oct 2017 10:38) (124/50 - 136/56)  BP(mean): --  RR: 18 (20 Oct 2017 10:38) (18 - 18)  SpO2: 97% (20 Oct 2017 10:38) (96% - 97%)    I&O's Summary    20 Oct 2017 07:01  -  20 Oct 2017 17:16  --------------------------------------------------------  IN: 240 mL / OUT: 0 mL / NET: 240 mL            PHYSICAL EXAM:   Neck: JVD  Respiratory: Breath sounds are clear bilaterally, No wheezing, rales or rhonchi  Cardiovascular: S1 and S2, regular rate and rhythm, no Murmurs, gallops or rubs  Extremities: No peripheral edema  Vascular: 2+ peripheral pulses  Neurological: A/O x 3, no focal deficits      MEDICATIONS:  MEDICATIONS  (STANDING):  aspirin enteric coated 81 milliGRAM(s) Oral daily  atorvastatin 20 milliGRAM(s) Oral at bedtime  carvedilol 6.25 milliGRAM(s) Oral every 12 hours  cefTRIAXone   IVPB 1 Gram(s) IV Intermittent every 24 hours  dextrose 5%. 1000 milliLiter(s) (50 mL/Hr) IV Continuous <Continuous>  dextrose 50% Injectable 12.5 Gram(s) IV Push once  dextrose 50% Injectable 25 Gram(s) IV Push once  dextrose 50% Injectable 25 Gram(s) IV Push once  insulin lispro (HumaLOG) corrective regimen sliding scale   SubCutaneous three times a day before meals  insulin lispro (HumaLOG) corrective regimen sliding scale   SubCutaneous at bedtime  metolazone 2.5 milliGRAM(s) Oral daily  rivaroxaban 15 milliGRAM(s) Oral every 24 hours  spironolactone 25 milliGRAM(s) Oral daily  tamsulosin 0.4 milliGRAM(s) Oral at bedtime      LABS: All Labs Reviewed:                        12.8   6.8   )-----------( 194      ( 19 Oct 2017 05:47 )             37.0     10-19    136  |  100  |  15  ----------------------------<  141<H>  4.2   |  27  |  1.12    Ca    9.2      19 Oct 2017 05:47  Phos  2.6     10-19  Mg     1.4     10-19        CARDIAC MARKERS ( 18 Oct 2017 20:07 )  0.044 ng/mL / x     / x     / x     / x          Blood Culture: Organism --  Gram Stain Blood -- Gram Stain --  Specimen Source .Blood Blood  Culture-Blood --    Organism --  Gram Stain Blood -- Gram Stain --  Specimen Source .Blood Blood  Culture-Blood --        lipid profile          10-18 @ 13:53  cholesterol 89 mg/dL  direct LDL 32 mg/dL  HDL 34 mg/dL  HDL/total cholesterol --  Triglyceride, serum 116 mg/dL    BNP     RADIOLOGY:    EKG:      Telemetry:  NSVT, AFib, pauses upt to 2.5-3 seconds    ECHO:  < from: Transthoracic Echocardiogram (10.19.17 @ 10:19) >   Moderate (2+) mitral regurgitation is present.   Mild mitral annular calcification is present.   The left atrium is moderately dilated.   The left ventricle is normal in size, wall thickness. Moderate global LV   hypocontactility   Estimated left ventricular ejection fraction is 40 %.    < end of copied text >

## 2017-10-21 LAB
GLUCOSE BLDC GLUCOMTR-MCNC: 145 MG/DL — HIGH (ref 70–99)
GLUCOSE BLDC GLUCOMTR-MCNC: 203 MG/DL — HIGH (ref 70–99)
GLUCOSE BLDC GLUCOMTR-MCNC: 215 MG/DL — HIGH (ref 70–99)
GLUCOSE BLDC GLUCOMTR-MCNC: 232 MG/DL — HIGH (ref 70–99)

## 2017-10-21 RX ADMIN — RIVAROXABAN 15 MILLIGRAM(S): KIT at 17:20

## 2017-10-21 RX ADMIN — Medication 2: at 11:56

## 2017-10-21 RX ADMIN — AZITHROMYCIN 500 MILLIGRAM(S): 500 TABLET, FILM COATED ORAL at 11:57

## 2017-10-21 RX ADMIN — Medication 2: at 17:19

## 2017-10-21 RX ADMIN — CEFTRIAXONE 100 GRAM(S): 500 INJECTION, POWDER, FOR SOLUTION INTRAMUSCULAR; INTRAVENOUS at 09:15

## 2017-10-21 RX ADMIN — CARVEDILOL PHOSPHATE 6.25 MILLIGRAM(S): 80 CAPSULE, EXTENDED RELEASE ORAL at 17:20

## 2017-10-21 RX ADMIN — CARVEDILOL PHOSPHATE 6.25 MILLIGRAM(S): 80 CAPSULE, EXTENDED RELEASE ORAL at 05:39

## 2017-10-21 RX ADMIN — Medication 81 MILLIGRAM(S): at 11:57

## 2017-10-21 RX ADMIN — ATORVASTATIN CALCIUM 20 MILLIGRAM(S): 80 TABLET, FILM COATED ORAL at 21:08

## 2017-10-21 RX ADMIN — Medication 5 MILLIGRAM(S): at 17:20

## 2017-10-21 RX ADMIN — TAMSULOSIN HYDROCHLORIDE 0.4 MILLIGRAM(S): 0.4 CAPSULE ORAL at 21:08

## 2017-10-21 RX ADMIN — SPIRONOLACTONE 25 MILLIGRAM(S): 25 TABLET, FILM COATED ORAL at 05:39

## 2017-10-21 NOTE — PROGRESS NOTE ADULT - SUBJECTIVE AND OBJECTIVE BOX
HOSPITALIST PROGRESS NOTE:  SUBJECTIVE:  PCP: [facility]  PMD: Gordo Joseph  Cardio: Dr. Clark  Pulm: Dr. Arroyo    Chief Complaint: Patient is a 77y old  Male who presents with a chief complaint of I have a spot on my right lung that's gotten bigger, so I'm having part of the right lung removed. (18 Oct 2017 15:06)    HPI:  78 yo male with PMH of CAD s/p CABG and stents, CHF (unknown EF) HTN, HLD, DM2, prostate CA, h/o lung malignancy s/p radiation, h/o RUL lobectomy for recurrent PNA, A. fib on xarelto brought in by EMS for episode of syncope. Pt does not recall the whole event. As per wife at bedside pt was brushing his teeth and putting in his dentures when she heard a loud noise from the bathroom. Patients son ran into the bathroom and found his father on the floor. He picked him up and carried him to the bed. Prior to episode pt states he felt weak but denies any dizziness, chest pain, palpitations, headaches, SOB, abd pain, N/V. As per wife pt developed some rhinorrhea and cough last night. No fevers or chills. No recent travel, no sick contacts. He was feeling lethargic and has not been eating since last night.   In ED pt given ceftriaxone and azithromycin for possible PNA. (18 Oct 2017 12:47)    10/19: Overnight patient has been bradycardic; Patient is requesting transfer to [facility] under Dr Landrum who is his cardiologist for continuity of care.  Patient has no complaints other than a cough.   10/20: yesterday transfer was not initiated by patients cardiologist. Todayspoke with patients cardio, Dr Amadou landrum and the transfer center and awaiting bed.  Over night patient had frequent pauses on tele. vitals have been stable   10/21:  transfer center called and patient will only going on monday;   Patient has no complaints. denies any dyspnea or CP    Allergies:  morphine (Hypotension)  oxycodone (Other)    REVIEW OF SYSTEMS:  See HPI. All other review of systems is negative unless indicated above.     OBJECTIVE  Physical Exam:  Vital Signs Last 24 Hrs  T(C): 36.8 (21 Oct 2017 11:47), Max: 36.8 (21 Oct 2017 11:47)  T(F): 98.3 (21 Oct 2017 11:47), Max: 98.3 (21 Oct 2017 11:47)  HR: 82 (21 Oct 2017 11:47) (54 - 82)  BP: 142/64 (21 Oct 2017 11:47) (121/54 - 142/64)  BP(mean): 67 (21 Oct 2017 05:08) (67 - 67)  RR: 20 (21 Oct 2017 11:47) (18 - 20)  SpO2: 97% (21 Oct 2017 11:47) (97% - 100%)    Constitutional: NAD, awake and alert, well-developed  Neurological: AAO x 3, no focal deficits  HEENT: PERRLA, EOMI, MMM  Neck: Soft and supple, No LAD, No JVD  Respiratory: Breath sounds are clear bilaterally, No wheezing, rales or rhonchi  Cardiovascular: S1 and S2, regular rate and rhythm; no Murmurs, gallops or rubs  Gastrointestinal: Bowel Sounds present, soft, nontender, nondistended, no guarding, no rebound tenderness  Back: No CVA tenderness   Extremities: No peripheral edema  Vascular: 2+ peripheral pulses  Musculoskeletal: 5/5 strength b/l upper and lower extremities  Skin: No rashes  Breast: Deferred  Rectal: Deferred    MEDICATIONS  (STANDING):  aspirin enteric coated 81 milliGRAM(s) Oral daily  atorvastatin 20 milliGRAM(s) Oral at bedtime  azithromycin  IVPB 500 milliGRAM(s) IV Intermittent every 24 hours  carvedilol 6.25 milliGRAM(s) Oral every 12 hours  cefTRIAXone   IVPB 1 Gram(s) IV Intermittent every 24 hours  dextrose 5%. 1000 milliLiter(s) (50 mL/Hr) IV Continuous <Continuous>  dextrose 50% Injectable 12.5 Gram(s) IV Push once  dextrose 50% Injectable 25 Gram(s) IV Push once  dextrose 50% Injectable 25 Gram(s) IV Push once  digoxin     Tablet 0.125 milliGRAM(s) Oral daily  insulin lispro (HumaLOG) corrective regimen sliding scale   SubCutaneous three times a day before meals  insulin lispro (HumaLOG) corrective regimen sliding scale   SubCutaneous at bedtime  metolazone 2.5 milliGRAM(s) Oral daily  rivaroxaban 15 milliGRAM(s) Oral every 24 hours  spironolactone 25 milliGRAM(s) Oral daily  tamsulosin 0.4 milliGRAM(s) Oral at bedtime      LABS: All Labs Reviewed:                        12.8   6.8   )-----------( 194      ( 19 Oct 2017 05:47 )             37.0     10-19    136  |  100  |  15  ----------------------------<  141<H>  4.2   |  27  |  1.12    Ca    9.2      19 Oct 2017 05:47  Phos  2.6     10-19  Mg     1.4     10-19    TPro  7.0  /  Alb  3.5  /  TBili  0.9  /  DBili  x   /  AST  15  /  ALT  14  /  AlkPhos  54  10-18    PT/INR - ( 18 Oct 2017 11:00 )   PT: 15.1 sec;   INR: 1.39 ratio         PTT - ( 18 Oct 2017 11:00 )  PTT:32.0 sec  CARDIAC MARKERS ( 18 Oct 2017 20:07 )  0.044 ng/mL / x     / x     / x     / x      CARDIAC MARKERS ( 18 Oct 2017 13:53 )  0.024 ng/mL / x     / x     / x     / x      CARDIAC MARKERS ( 18 Oct 2017 11:00 )  0.023 ng/mL / x     / x     / x     / x          RADIOLOGY/EKG:    < from: Xray Chest 1 View AP/PA. (10.18.17 @ 11:14) >    IMPRESSION:    Findings consistent with mild to moderate right-sided pneumonia or   pneumonitis. Repeat radiography suggested in 4 weeks    < end of copied text >    < from: CT Head No Cont (10.18.17 @ 11:32) >    Impression:  1. no acute findings.        < end of copied text >    < from: CT Cervical Spine No Cont (10.18.17 @ 11:32) >    IMPRESSION:    No acute findings      < end of copied text >    < from: CT Chest No Cont (10.18.17 @ 13:35) >    IMPRESSION:     Large consolidation in the right lower lobe. Follow-up to complete   resolution with radiographic follow-up in 4 weeks.      < end of copied text >    < from: Transthoracic Echocardiogram (10.19.17 @ 10:19) >     Impression     Summary     Moderate (2+) mitral regurgitation is present.   Mild mitral annular calcification is present.   The left atrium is moderately dilated.   The left ventricle is normal in size, wall thickness. Moderate global LV   hypocontactility   Estimated left ventricular ejection fraction is 40 %.     Signature    < end of copied text >

## 2017-10-21 NOTE — PROVIDER CONTACT NOTE (OTHER) - SITUATION
SPOKE TO DR MANE..  AWARE OF CONSULT
Left consult with office staff. Spoke to Lilian
Office aware of consult, spoke with Melia (answering service rep).
called dr jacobson service on consult..  service aware

## 2017-10-21 NOTE — PROGRESS NOTE ADULT - SUBJECTIVE AND OBJECTIVE BOX
CHIEF COMPLAINT: Patient is a 77y old  Male who presents with a chief complaint of I have a spot on my right lung that's gotten bigger, so I'm having part of the right lung removed. (18 Oct 2017 15:06)      HPI:  78 yo male with PMH of CAD s/p CABG and stents, CHF (unknown EF) HTN, HLD, DM2, prostate CA, h/o lung malignancy s/p radiation, h/o RUL lobectomy for recurrent PNA, A. fib on xarelto brought in by EMS for episode of syncope. Pt does not recall the whole event. As per wife at bedside pt was brushing his teeth and putting in his dentures when she heard a loud noise from the bathroom. Patients son ran into the bathroom and found his father on the floor. He picked him up and carried him to the bed. Prior to episode pt states he felt weak but denies any dizziness, chest pain, palpitations, headaches, SOB, abd pain, N/V. As per wife pt developed some rhinorrhea and cough last night. No fevers or chills. No recent travel, no sick contacts. He was feeling lethargic and has not been eating since last night.     In ED pt given ceftriaxone and azithromycin for possible PNA. (18 Oct 2017 12:47)    10/19-patient with significant cardiac risk factors, with telemetry-showing ventricular arrhythmia(AIVR), pauses of over 2.5sec.  Has R infiltrate-unsure if pneumonia or somthing more ominous    10/21 no acute issues overnight      PMHx: PAST MEDICAL & SURGICAL HISTORY:  Osteoarthritis  Lung cancer  DM (diabetes mellitus)  Cataracts, bilateral  Prostate ca  BPH (benign prostatic hyperplasia)  Carotid stenosis, left  CAD (coronary artery disease): S/P coronary artery stents 2002, 2003  HLD (hyperlipidemia)  HTN (hypertension)  S/P CABG x 3: 1997  S/P lobectomy of lung: right upper 1998  S/P TURP  Stented coronary artery: 2002, 2003  S/P carotid endarterectomy: left, S/P left carotid stent 2000      Allergies: Allergies    morphine (Hypotension)  oxycodone (Other)    Intolerances          REVIEW OF SYSTEMS:    CONSTITUTIONAL: weakness, chills  EYES/ENT: No visual changes;  No vertigo or throat pain   NECK: No pain or stiffness  RESPIRATORY: No cough, wheezing, hemoptysis; No shortness of breath  CARDIOVASCULAR: No chest pain or palpitations  GASTROINTESTINAL: No abdominal or epigastric pain. No nausea, vomiting, or hematemesis; No diarrhea or constipation. No melena or hematochezia.    Vital Signs Last 24 Hrs  T(C): 37 (19 Oct 2017 05:25), Max: 37 (19 Oct 2017 05:25)  T(F): 98.6 (19 Oct 2017 05:25), Max: 98.6 (19 Oct 2017 05:25)  HR: 63 (19 Oct 2017 05:25) (61 - 66)  BP: 117/65 (19 Oct 2017 05:25) (117/65 - 142/62)  BP(mean): 78 (19 Oct 2017 05:25) (78 - 78)  RR: 18 (18 Oct 2017 20:53) (15 - 118)  SpO2: 98% (19 Oct 2017 05:25) (94% - 100%)    I&O's Summary      CAPILLARY BLOOD GLUCOSE      POCT Blood Glucose.: 129 mg/dL (18 Oct 2017 21:34)  POCT Blood Glucose.: 197 mg/dL (18 Oct 2017 16:53)  POCT Blood Glucose.: 193 mg/dL (18 Oct 2017 11:42)      PHYSICAL EXAM:   Constitutional: NAD, awake and alert, well-developed  HEENT: PERR, EOMI, Normal Hearing, MMM  Neck: JVD  Respiratory:  rhonchi  Cardiovascular: S1 and S2, regular rate and rhythm, Murmurs  Gastrointestinal: Bowel Sounds present, soft, nontender, nondistended, no guarding, no rebound  Extremities: No peripheral edema      MEDICATIONS:    aspirin enteric coated 81 milliGRAM(s) Oral daily  atorvastatin 20 milliGRAM(s) Oral at bedtime  azithromycin   Tablet 500 milliGRAM(s) Oral daily  carvedilol 6.25 milliGRAM(s) Oral every 12 hours  cefTRIAXone   IVPB 1 Gram(s) IV Intermittent every 24 hours  dextrose 5%. 1000 milliLiter(s) (50 mL/Hr) IV Continuous <Continuous>  dextrose 50% Injectable 12.5 Gram(s) IV Push once  dextrose 50% Injectable 25 Gram(s) IV Push once  dextrose 50% Injectable 25 Gram(s) IV Push once  insulin lispro (HumaLOG) corrective regimen sliding scale   SubCutaneous three times a day before meals  insulin lispro (HumaLOG) corrective regimen sliding scale   SubCutaneous at bedtime  metolazone 2.5 milliGRAM(s) Oral daily  rivaroxaban 15 milliGRAM(s) Oral every 24 hours  spironolactone 25 milliGRAM(s) Oral daily  tamsulosin 0.4 milliGRAM(s) Oral at bedtime      LABS: All Labs Reviewed:                 Blood Culture:   lipid profile lipid profile                          10-18 @ 13:53  cholesterol           89 mg/dL  Direct LDL            32 mg/dL  HDL cholesterol       34 mg/dL  HDL/Total cholesterol --  Triglycerides, serum  116 mg/dL        RADIOLOGY:  < from: CT Chest No Cont (10.18.17 @ 13:35) >  LUNGS, AIRWAYS: The central airways are patent. There is background   centrilobular emphysema. Prior right upper lobe wedge resection. There is   a large consolidation in the right lower lobe.    EKG:    Telemetry:  sinus rhtyhm, PVC's, AIVR, pauses    ECHO:< from: Transthoracic Echocardiogram (10.19.17 @ 10:19) >     Summary     Moderate (2+) mitral regurgitation is present.   Mild mitral annular calcification is present.   The left atrium is moderately dilated.   The left ventricle is normal in size, wall thickness. Moderate global LV   hypocontactility   Estimated left ventricular ejection fraction is 40 %.     Signature     ----------------------------------------------------------------   Electronically signed by Kody Sanchez MD(Interpreting   physician) on 10/19/2017 04:55 PM   ----------------------------------------------------------------    < end of copied text >

## 2017-10-21 NOTE — CONSULT NOTE ADULT - ASSESSMENT
PROBLEMS;    RLL PNA - community acquired  COPD/EMPHYSEMA  Syncope due to bradycardia-AFIB-multiple < 3 sec pause EKG with multiple PVCs  Systolic CHF  CKD stage 3  CAD s/p CABG and stents  DM2-HbA1c 7.9   HTN   HLD    PLAN;    IV AZITHRO/RHOCEPHIN  CARIO FU  SUPPORTIVE CARE  DVT PROPHYLASIX

## 2017-10-21 NOTE — CONSULT NOTE ADULT - SUBJECTIVE AND OBJECTIVE BOX
HPI:    pat seen & examine & full consult dictated.      PAST MEDICAL & SURGICAL HISTORY:  Osteoarthritis  Lung cancer  DM (diabetes mellitus)  Cataracts, bilateral  Prostate ca  BPH (benign prostatic hyperplasia)  Carotid stenosis, left  CAD (coronary artery disease): S/P coronary artery stents 2002, 2003  HLD (hyperlipidemia)  HTN (hypertension)  S/P CABG x 3: 1997  S/P lobectomy of lung: right upper 1998  S/P TURP  Stented coronary artery: 2002, 2003  S/P carotid endarterectomy: left, S/P left carotid stent 2000      Home Medications:  acetaminophen 325 mg oral tablet: 2 tab(s) orally every 6 hours, As needed, Mild Pain (1 - 3) (19 Oct 2017 13:27)  aspirin 81 mg oral delayed release tablet: 1 tab(s) orally once a day (18 Oct 2017 12:43)  atorvastatin 20 mg oral tablet: 1 tab(s) orally once a day (18 Oct 2017 12:43)  azithromycin 500 mg oral tablet: 1 tab(s) orally once a day (20 Oct 2017 11:05)  carvedilol 6.25 mg oral tablet: 1 tab(s) orally 2 times a day (18 Oct 2017 12:43)  cefTRIAXone: 1 gram(s) intravenous once a day (19 Oct 2017 13:27)  Digitek 125 mcg (0.125 mg) oral tablet: 1 tab(s) orally once a day (18 Oct 2017 12:43)  insulin lispro 100 units/mL subcutaneous solution:  subcutaneous 3 times a day (before meals); 1 Unit(s) if Glucose 151 - 200  2 Unit(s) if Glucose 201 - 250  3 Unit(s) if Glucose 251 - 300  4 Unit(s) if Glucose 301 - 350  5 Unit(s) if Glucose 351 - 400  6 Unit(s) if Glucose Greater Than 400 (19 Oct 2017 13:27)  insulin lispro 100 units/mL subcutaneous solution:  subcutaneous once a day (at bedtime); 0 Unit(s) if Glucose 0 - 250  1 Unit(s) if Glucose 251 - 300  2 Unit(s) if Glucose 301 - 350  3 Unit(s) if Glucose 351 - 400  4 Unit(s) if Glucose Greater Than 400 (19 Oct 2017 13:27)  metOLazone 2.5 mg oral tablet: 1 tab(s) orally once a day (18 Oct 2017 12:43)  rivaroxaban 10 mg oral tablet: 1 tab(s) orally once a day (18 Oct 2017 12:43)  spironolactone 25 mg oral tablet: 1 tab(s) orally once a day (18 Oct 2017 12:43)  tamsulosin 0.4 mg oral capsule: 1 cap(s) orally once a day (at bedtime) (19 Oct 2017 13:27)      MEDICATIONS  (STANDING):  aspirin enteric coated 81 milliGRAM(s) Oral daily  atorvastatin 20 milliGRAM(s) Oral at bedtime  azithromycin   Tablet 500 milliGRAM(s) Oral daily  carvedilol 6.25 milliGRAM(s) Oral every 12 hours  cefTRIAXone   IVPB 1 Gram(s) IV Intermittent every 24 hours  dextrose 5%. 1000 milliLiter(s) (50 mL/Hr) IV Continuous <Continuous>  dextrose 50% Injectable 12.5 Gram(s) IV Push once  dextrose 50% Injectable 25 Gram(s) IV Push once  dextrose 50% Injectable 25 Gram(s) IV Push once  enalapril 5 milliGRAM(s) Oral two times a day  insulin lispro (HumaLOG) corrective regimen sliding scale   SubCutaneous three times a day before meals  insulin lispro (HumaLOG) corrective regimen sliding scale   SubCutaneous at bedtime  metolazone 2.5 milliGRAM(s) Oral daily  rivaroxaban 15 milliGRAM(s) Oral every 24 hours  spironolactone 25 milliGRAM(s) Oral daily  tamsulosin 0.4 milliGRAM(s) Oral at bedtime    MEDICATIONS  (PRN):  acetaminophen   Tablet. 650 milliGRAM(s) Oral every 6 hours PRN Mild Pain (1 - 3)  dextrose Gel 1 Dose(s) Oral once PRN Blood Glucose LESS THAN 70 milliGRAM(s)/deciliter  glucagon  Injectable 1 milliGRAM(s) IntraMuscular once PRN Glucose LESS THAN 70 milligrams/deciliter  guaiFENesin    Syrup 200 milliGRAM(s) Oral every 6 hours PRN Cough  melatonin 3 milliGRAM(s) Oral at bedtime PRN Insomnia      Allergies    morphine (Hypotension)  oxycodone (Other)    Intolerances        SOCIAL HISTORY: Denies tobacco, etoh abuse or illicit drug use    FAMILY HISTORY:  No pertinent family history in first degree relatives      Vital Signs Last 24 Hrs  T(C): 36.8 (21 Oct 2017 11:47), Max: 36.8 (21 Oct 2017 11:47)  T(F): 98.3 (21 Oct 2017 11:47), Max: 98.3 (21 Oct 2017 11:47)  HR: 82 (21 Oct 2017 11:47) (54 - 82)  BP: 142/64 (21 Oct 2017 11:47) (121/54 - 142/64)  BP(mean): 67 (21 Oct 2017 05:08) (67 - 67)  RR: 20 (21 Oct 2017 11:47) (18 - 20)  SpO2: 97% (21 Oct 2017 11:47) (97% - 100%)        REVIEW OF SYSTEMS:    HEENT:  Eyes:  No diplopia or blurred vision. ENT:  No earache, sore throat or runny nose.  CARDIOVASCULAR:  No pressure, squeezing, tightness, heaviness or aching about the chest, neck, axilla or epigastrium.  RESPIRATORY: no shortness of breath, PND or orthopnea. syncopy positive, cough  GASTROINTESTINAL:  No nausea, vomiting or diarrhea.  GENITOURINARY:  No dysuria, frequency or urgency.  SKIN:  No change in skin, hair or nails.  NEUROLOGIC:  No paresthesias, fasciculations, seizures or weakness.  PSYCHIATRIC:  No disorder of thought or mood.  ENDOCRINE:  No heat or cold intolerance, polyuria or polydipsia.  HEMATOLOGICAL:  No easy bruising or bleedings:  .     PHYSICAL EXAMINATION:    GENERAL APPEARANCE:  Pt. is not currently dyspneic, in no distress. Pt. is alert, oriented, and pleasant.  HEENT:  Pupils are normal and react normally. No icterus. Mucous membranes well colored.  NECK:  Supple. No lymphadenopathy. Jugular venous pressure not elevated. Carotids equal.   HEART:   The cardiac impulse has a normal quality. Regular. Normal S1 and S2. There are no murmurs, rubs or gallops noted  CHEST:  Chest crackles r base auscultation. Normal respiratory effort.  ABDOMEN:  Soft and nontender.   EXTREMITIES:  There is no cyanosis, clubbing or edema.   SKIN:  No rash or significant lesions are noted.    LABS:    POCT Blood Glucose.: 232 mg/dL (10.21.17 @ 11:28)    RADIOLOGY & ADDITIONAL STUDIES:     CT Chest No Cont (10.18.17 @ 13:35) >  IMPRESSION:     Large consolidation in the right lower lobe. Follow-up to complete   resolution with radiographic follow-up in 4 weeks.

## 2017-10-22 LAB
ANION GAP SERPL CALC-SCNC: 11 MMOL/L — SIGNIFICANT CHANGE UP (ref 5–17)
BUN SERPL-MCNC: 25 MG/DL — HIGH (ref 7–23)
CALCIUM SERPL-MCNC: 9.3 MG/DL — SIGNIFICANT CHANGE UP (ref 8.5–10.1)
CHLORIDE SERPL-SCNC: 101 MMOL/L — SIGNIFICANT CHANGE UP (ref 96–108)
CO2 SERPL-SCNC: 26 MMOL/L — SIGNIFICANT CHANGE UP (ref 22–31)
CREAT SERPL-MCNC: 1.29 MG/DL — SIGNIFICANT CHANGE UP (ref 0.5–1.3)
GLUCOSE BLDC GLUCOMTR-MCNC: 140 MG/DL — HIGH (ref 70–99)
GLUCOSE BLDC GLUCOMTR-MCNC: 152 MG/DL — HIGH (ref 70–99)
GLUCOSE BLDC GLUCOMTR-MCNC: 152 MG/DL — HIGH (ref 70–99)
GLUCOSE BLDC GLUCOMTR-MCNC: 195 MG/DL — HIGH (ref 70–99)
GLUCOSE SERPL-MCNC: 149 MG/DL — HIGH (ref 70–99)
HCT VFR BLD CALC: 38.3 % — LOW (ref 39–50)
HGB BLD-MCNC: 13.2 G/DL — SIGNIFICANT CHANGE UP (ref 13–17)
MCHC RBC-ENTMCNC: 31.6 PG — SIGNIFICANT CHANGE UP (ref 27–34)
MCHC RBC-ENTMCNC: 34.4 GM/DL — SIGNIFICANT CHANGE UP (ref 32–36)
MCV RBC AUTO: 92.1 FL — SIGNIFICANT CHANGE UP (ref 80–100)
PLATELET # BLD AUTO: 217 K/UL — SIGNIFICANT CHANGE UP (ref 150–400)
POTASSIUM SERPL-MCNC: 4.2 MMOL/L — SIGNIFICANT CHANGE UP (ref 3.5–5.3)
POTASSIUM SERPL-SCNC: 4.2 MMOL/L — SIGNIFICANT CHANGE UP (ref 3.5–5.3)
RBC # BLD: 4.16 M/UL — LOW (ref 4.2–5.8)
RBC # FLD: 12 % — SIGNIFICANT CHANGE UP (ref 10.3–14.5)
SODIUM SERPL-SCNC: 138 MMOL/L — SIGNIFICANT CHANGE UP (ref 135–145)
WBC # BLD: 8.9 K/UL — SIGNIFICANT CHANGE UP (ref 3.8–10.5)
WBC # FLD AUTO: 8.9 K/UL — SIGNIFICANT CHANGE UP (ref 3.8–10.5)

## 2017-10-22 RX ORDER — DIGOXIN 250 MCG
1 TABLET ORAL
Qty: 0 | Refills: 0 | COMMUNITY

## 2017-10-22 RX ADMIN — SPIRONOLACTONE 25 MILLIGRAM(S): 25 TABLET, FILM COATED ORAL at 05:56

## 2017-10-22 RX ADMIN — TAMSULOSIN HYDROCHLORIDE 0.4 MILLIGRAM(S): 0.4 CAPSULE ORAL at 21:12

## 2017-10-22 RX ADMIN — ATORVASTATIN CALCIUM 20 MILLIGRAM(S): 80 TABLET, FILM COATED ORAL at 21:12

## 2017-10-22 RX ADMIN — Medication 81 MILLIGRAM(S): at 12:02

## 2017-10-22 RX ADMIN — RIVAROXABAN 15 MILLIGRAM(S): KIT at 17:35

## 2017-10-22 RX ADMIN — AZITHROMYCIN 500 MILLIGRAM(S): 500 TABLET, FILM COATED ORAL at 12:02

## 2017-10-22 RX ADMIN — Medication 1: at 08:12

## 2017-10-22 RX ADMIN — CEFTRIAXONE 100 GRAM(S): 500 INJECTION, POWDER, FOR SOLUTION INTRAMUSCULAR; INTRAVENOUS at 12:01

## 2017-10-22 RX ADMIN — CARVEDILOL PHOSPHATE 6.25 MILLIGRAM(S): 80 CAPSULE, EXTENDED RELEASE ORAL at 05:56

## 2017-10-22 RX ADMIN — Medication 2.5 MILLIGRAM(S): at 17:35

## 2017-10-22 RX ADMIN — CARVEDILOL PHOSPHATE 6.25 MILLIGRAM(S): 80 CAPSULE, EXTENDED RELEASE ORAL at 17:35

## 2017-10-22 RX ADMIN — Medication 5 MILLIGRAM(S): at 05:56

## 2017-10-22 NOTE — PROGRESS NOTE ADULT - SUBJECTIVE AND OBJECTIVE BOX
HOSPITALIST PROGRESS NOTE:  SUBJECTIVE:  PCP: All Phys[facility] from [facility]  PMD: Gordo Joseph  Cardio: Dr. Clark  Pulm: Dr. Arroyo    Chief Complaint: Patient is a 77y old  Male who presents with a chief complaint of I have a spot on my right lung that's gotten bigger, so I'm having part of the right lung removed. (18 Oct 2017 15:06)    HPI:  78 yo male with PMH of CAD s/p CABG and stents, CHF (unknown EF) HTN, HLD, DM2, prostate CA, h/o lung malignancy s/p radiation, h/o RUL lobectomy for recurrent PNA, A. fib on xarelto brought in by EMS for episode of syncope. Pt does not recall the whole event. As per wife at bedside pt was brushing his teeth and putting in his dentures when she heard a loud noise from the bathroom. Patients son ran into the bathroom and found his father on the floor. He picked him up and carried him to the bed. Prior to episode pt states he felt weak but denies any dizziness, chest pain, palpitations, headaches, SOB, abd pain, N/V. As per wife pt developed some rhinorrhea and cough last night. No fevers or chills. No recent travel, no sick contacts. He was feeling lethargic and has not been eating since last night.   In ED pt given ceftriaxone and azithromycin for possible PNA. (18 Oct 2017 12:47)    10/19: Overnight patient has been bradycardic; Patient is requesting transfer to [facility] under Dr Landrum who is his cardiologist for continuity of care.  Patient has no complaints other than a cough.   10/20: yesterday transfer was not initiated by patients cardiologist. Todayspoke with patients cardio, Dr Amadou landrmu and the transfer center and awaiting bed.  Over night patient had frequent pauses on tele. vitals have been stable   10/21:  transfer center called and patient will only going on monday;   Patient has no complaints. denies any dyspnea or CP  10/22: this morning patient was dizzy, BP was borderline low and Patients Vasotec was decreased; Hes voiding well.    Allergies:  morphine (Hypotension)  oxycodone (Other)    REVIEW OF SYSTEMS:  See HPI. All other review of systems is negative unless indicated above.     OBJECTIVE  Physical Exam:  Vital Signs Last 24 Hrs  T(C): 36.3 (22 Oct 2017 04:47), Max: 36.3 (21 Oct 2017 16:51)  T(F): 97.4 (22 Oct 2017 04:47), Max: 97.4 (22 Oct 2017 04:47)  HR: 68 (22 Oct 2017 08:31) (51 - 68)  BP: 100/57 (22 Oct 2017 08:31) (100/57 - 147/58)  BP(mean): --  RR: 18 (21 Oct 2017 16:51) (18 - 18)  SpO2: 100% (22 Oct 2017 04:47) (100% - 100%)    Constitutional: NAD, awake and alert, well-developed  Neurological: AAO x 3, no focal deficits  HEENT: PERRLA, EOMI, MMM  Neck: Soft and supple, No LAD, No JVD  Respiratory: Breath sounds are clear bilaterally, No wheezing, rales or rhonchi  Cardiovascular: S1 and S2, regular rate and rhythm; no Murmurs, gallops or rubs  Gastrointestinal: Bowel Sounds present, soft, nontender, nondistended, no guarding, no rebound tenderness  Back: No CVA tenderness   Extremities: No peripheral edema  Vascular: 2+ peripheral pulses  Musculoskeletal: 5/5 strength b/l upper and lower extremities  Skin: No rashes  Breast: Deferred  Rectal: Deferred    MEDICATIONS  (STANDING):  aspirin enteric coated 81 milliGRAM(s) Oral daily  atorvastatin 20 milliGRAM(s) Oral at bedtime  azithromycin  IVPB 500 milliGRAM(s) IV Intermittent every 24 hours  carvedilol 6.25 milliGRAM(s) Oral every 12 hours  cefTRIAXone   IVPB 1 Gram(s) IV Intermittent every 24 hours  dextrose 5%. 1000 milliLiter(s) (50 mL/Hr) IV Continuous <Continuous>  dextrose 50% Injectable 12.5 Gram(s) IV Push once  dextrose 50% Injectable 25 Gram(s) IV Push once  dextrose 50% Injectable 25 Gram(s) IV Push once  digoxin     Tablet 0.125 milliGRAM(s) Oral daily  insulin lispro (HumaLOG) corrective regimen sliding scale   SubCutaneous three times a day before meals  insulin lispro (HumaLOG) corrective regimen sliding scale   SubCutaneous at bedtime  metolazone 2.5 milliGRAM(s) Oral daily  rivaroxaban 15 milliGRAM(s) Oral every 24 hours  spironolactone 25 milliGRAM(s) Oral daily  tamsulosin 0.4 milliGRAM(s) Oral at bedtime      LABS: All Labs Reviewed:                        12.8   6.8   )-----------( 194      ( 19 Oct 2017 05:47 )             37.0     10-19    136  |  100  |  15  ----------------------------<  141<H>  4.2   |  27  |  1.12    Ca    9.2      19 Oct 2017 05:47  Phos  2.6     10-19  Mg     1.4     10-19    TPro  7.0  /  Alb  3.5  /  TBili  0.9  /  DBili  x   /  AST  15  /  ALT  14  /  AlkPhos  54  10-18    PT/INR - ( 18 Oct 2017 11:00 )   PT: 15.1 sec;   INR: 1.39 ratio         PTT - ( 18 Oct 2017 11:00 )  PTT:32.0 sec  CARDIAC MARKERS ( 18 Oct 2017 20:07 )  0.044 ng/mL / x     / x     / x     / x      CARDIAC MARKERS ( 18 Oct 2017 13:53 )  0.024 ng/mL / x     / x     / x     / x      CARDIAC MARKERS ( 18 Oct 2017 11:00 )  0.023 ng/mL / x     / x     / x     / x          RADIOLOGY/EKG:    < from: Xray Chest 1 View AP/PA. (10.18.17 @ 11:14) >    IMPRESSION:    Findings consistent with mild to moderate right-sided pneumonia or   pneumonitis. Repeat radiography suggested in 4 weeks    < end of copied text >    < from: CT Head No Cont (10.18.17 @ 11:32) >    Impression:  1. no acute findings.        < end of copied text >    < from: CT Cervical Spine No Cont (10.18.17 @ 11:32) >    IMPRESSION:    No acute findings      < end of copied text >    < from: CT Chest No Cont (10.18.17 @ 13:35) >    IMPRESSION:     Large consolidation in the right lower lobe. Follow-up to complete   resolution with radiographic follow-up in 4 weeks.      < end of copied text >    < from: Transthoracic Echocardiogram (10.19.17 @ 10:19) >     Impression     Summary     Moderate (2+) mitral regurgitation is present.   Mild mitral annular calcification is present.   The left atrium is moderately dilated.   The left ventricle is normal in size, wall thickness. Moderate global LV   hypocontactility   Estimated left ventricular ejection fraction is 40 %.     Signature    < end of copied text >

## 2017-10-22 NOTE — PROGRESS NOTE ADULT - SUBJECTIVE AND OBJECTIVE BOX
CHIEF COMPLAINT: Patient is a 77y old  Male who presents with a chief complaint of I have a spot on my right lung that's gotten bigger, so I'm having part of the right lung removed. (18 Oct 2017 15:06)      HPI:  78 yo male with PMH of CAD s/p CABG and stents, CHF (unknown EF) HTN, HLD, DM2, prostate CA, h/o lung malignancy s/p radiation, h/o RUL lobectomy for recurrent PNA, A. fib on xarelto brought in by EMS for episode of syncope. Pt does not recall the whole event. As per wife at bedside pt was brushing his teeth and putting in his dentures when she heard a loud noise from the bathroom. Patients son ran into the bathroom and found his father on the floor. He picked him up and carried him to the bed. Prior to episode pt states he felt weak but denies any dizziness, chest pain, palpitations, headaches, SOB, abd pain, N/V. As per wife pt developed some rhinorrhea and cough last night. No fevers or chills. No recent travel, no sick contacts. He was feeling lethargic and has not been eating since last night.     In ED pt given ceftriaxone and azithromycin for possible PNA. (18 Oct 2017 12:47)    10/19-patient with significant cardiac risk factors, with telemetry-showing ventricular arrhythmia(AIVR), pauses of over 2.5sec.  Has R infiltrate-unsure if pneumonia or somthing more ominous    10/21 no acute issues overnight  10/22 Still  with bradycardua and PVCs    PMHx: PAST MEDICAL & SURGICAL HISTORY:  Osteoarthritis  Lung cancer  DM (diabetes mellitus)  Cataracts, bilateral  Prostate ca  BPH (benign prostatic hyperplasia)  Carotid stenosis, left  CAD (coronary artery disease): S/P coronary artery stents 2002, 2003  HLD (hyperlipidemia)  HTN (hypertension)  S/P CABG x 3: 1997  S/P lobectomy of lung: right upper 1998  S/P TURP  Stented coronary artery: 2002, 2003  S/P carotid endarterectomy: left, S/P left carotid stent 2000      Allergies: Allergies    morphine (Hypotension)  oxycodone (Other)    Intolerances          REVIEW OF SYSTEMS:    CONSTITUTIONAL: weakness, chills  EYES/ENT: No visual changes;  No vertigo or throat pain   NECK: No pain or stiffness  RESPIRATORY: No cough, wheezing, hemoptysis; No shortness of breath  CARDIOVASCULAR: No chest pain or palpitations  GASTROINTESTINAL: No abdominal or epigastric pain. No nausea, vomiting, or hematemesis; No diarrhea or constipation. No melena or hematochezia.    ICU Vital Signs Last 24 Hrs  T(C): 36.3 (22 Oct 2017 04:47), Max: 36.8 (21 Oct 2017 11:47)  T(F): 97.4 (22 Oct 2017 04:47), Max: 98.3 (21 Oct 2017 11:47)  HR: 66 (22 Oct 2017 04:47) (51 - 82)  BP: 111/58 (22 Oct 2017 04:47) (111/58 - 147/58)  BP(mean): --  ABP: --  ABP(mean): --  RR: 18 (21 Oct 2017 16:51) (18 - 20)  SpO2: 100% (22 Oct 2017 04:47) (97% - 100%)        PHYSICAL EXAM:   Constitutional: NAD, awake and alert, well-developed  HEENT: PERR, EOMI, Normal Hearing, MMM  Neck: JVD  Respiratory:  rhonchi  Cardiovascular: S1 and S2, regular rate and rhythm, Murmurs  Gastrointestinal: Bowel Sounds present, soft, nontender, nondistended, no guarding, no rebound  Extremities: No peripheral edema      MEDICATIONS  (STANDING):  aspirin enteric coated 81 milliGRAM(s) Oral daily  atorvastatin 20 milliGRAM(s) Oral at bedtime  azithromycin   Tablet 500 milliGRAM(s) Oral daily  carvedilol 6.25 milliGRAM(s) Oral every 12 hours  cefTRIAXone   IVPB 1 Gram(s) IV Intermittent every 24 hours  dextrose 5%. 1000 milliLiter(s) (50 mL/Hr) IV Continuous <Continuous>  dextrose 50% Injectable 12.5 Gram(s) IV Push once  dextrose 50% Injectable 25 Gram(s) IV Push once  dextrose 50% Injectable 25 Gram(s) IV Push once  enalapril 5 milliGRAM(s) Oral two times a day  insulin lispro (HumaLOG) corrective regimen sliding scale   SubCutaneous three times a day before meals  insulin lispro (HumaLOG) corrective regimen sliding scale   SubCutaneous at bedtime  metolazone 2.5 milliGRAM(s) Oral daily  rivaroxaban 15 milliGRAM(s) Oral every 24 hours  spironolactone 25 milliGRAM(s) Oral daily  tamsulosin 0.4 milliGRAM(s) Oral at bedtime      LABS: All Labs Reviewed:                                   13.2   8.9   )-----------( 217      ( 22 Oct 2017 05:24 )             38.3   10-22    138  |  101  |  25<H>  ----------------------------<  149<H>  4.2   |  26  |  1.29    Ca    9.3      22 Oct 2017 05:24        RADIOLOGY:  < from: CT Chest No Cont (10.18.17 @ 13:35) >  LUNGS, AIRWAYS: The central airways are patent. There is background   centrilobular emphysema. Prior right upper lobe wedge resection. There is   a large consolidation in the right lower lobe.    EKG:atrial fibrillation with premature ventricular or aberrantlyconducted complexes  Left axis deviation  ST & T wave abnormality, consider lateral ischemia  Abnormal ECG    < end of copied text >    Telemetry:  AF, PVC's, AIVR, pauses    ECHO:< from: Transthoracic Echocardiogram (10.19.17 @ 10:19) >     Summary     Moderate (2+) mitral regurgitation is present.   Mild mitral annular calcification is present.   The left atrium is moderately dilated.   The left ventricle is normal in size, wall thickness. Moderate global LV   hypocontactility   Estimated left ventricular ejection fraction is 40 %.     Signature     ----------------------------------------------------------------   Electronically signed by Kody Sanchez MD(Interpreting   physician) on 10/19/2017 04:55 PM   ----------------------------------------------------------------    < end of copied text >

## 2017-10-22 NOTE — PROGRESS NOTE ADULT - SUBJECTIVE AND OBJECTIVE BOX
Subjective:    pat this am could not passed urine & felt lightheadedness, was started on vasotec 5mg q12hr, now cut down 2.5mg bid.    Home Medications:  acetaminophen 325 mg oral tablet: 2 tab(s) orally every 6 hours, As needed, Mild Pain (1 - 3) (19 Oct 2017 13:27)  aspirin 81 mg oral delayed release tablet: 1 tab(s) orally once a day (18 Oct 2017 12:43)  atorvastatin 20 mg oral tablet: 1 tab(s) orally once a day (18 Oct 2017 12:43)  azithromycin 500 mg oral tablet: 1 tab(s) orally once a day (20 Oct 2017 11:05)  carvedilol 6.25 mg oral tablet: 1 tab(s) orally 2 times a day (18 Oct 2017 12:43)  cefTRIAXone: 1 gram(s) intravenous once a day (19 Oct 2017 13:27)  Digitek 125 mcg (0.125 mg) oral tablet: 1 tab(s) orally once a day (18 Oct 2017 12:43)  insulin lispro 100 units/mL subcutaneous solution:  subcutaneous 3 times a day (before meals); 1 Unit(s) if Glucose 151 - 200  2 Unit(s) if Glucose 201 - 250  3 Unit(s) if Glucose 251 - 300  4 Unit(s) if Glucose 301 - 350  5 Unit(s) if Glucose 351 - 400  6 Unit(s) if Glucose Greater Than 400 (19 Oct 2017 13:27)  insulin lispro 100 units/mL subcutaneous solution:  subcutaneous once a day (at bedtime); 0 Unit(s) if Glucose 0 - 250  1 Unit(s) if Glucose 251 - 300  2 Unit(s) if Glucose 301 - 350  3 Unit(s) if Glucose 351 - 400  4 Unit(s) if Glucose Greater Than 400 (19 Oct 2017 13:27)  metOLazone 2.5 mg oral tablet: 1 tab(s) orally once a day (18 Oct 2017 12:43)  rivaroxaban 10 mg oral tablet: 1 tab(s) orally once a day (18 Oct 2017 12:43)  spironolactone 25 mg oral tablet: 1 tab(s) orally once a day (18 Oct 2017 12:43)  tamsulosin 0.4 mg oral capsule: 1 cap(s) orally once a day (at bedtime) (19 Oct 2017 13:27)    MEDICATIONS  (STANDING):  aspirin enteric coated 81 milliGRAM(s) Oral daily  atorvastatin 20 milliGRAM(s) Oral at bedtime  azithromycin   Tablet 500 milliGRAM(s) Oral daily  carvedilol 6.25 milliGRAM(s) Oral every 12 hours  cefTRIAXone   IVPB 1 Gram(s) IV Intermittent every 24 hours  dextrose 5%. 1000 milliLiter(s) (50 mL/Hr) IV Continuous <Continuous>  dextrose 50% Injectable 12.5 Gram(s) IV Push once  dextrose 50% Injectable 25 Gram(s) IV Push once  dextrose 50% Injectable 25 Gram(s) IV Push once  enalapril 2.5 milliGRAM(s) Oral every 12 hours  insulin lispro (HumaLOG) corrective regimen sliding scale   SubCutaneous three times a day before meals  insulin lispro (HumaLOG) corrective regimen sliding scale   SubCutaneous at bedtime  metolazone 2.5 milliGRAM(s) Oral daily  rivaroxaban 15 milliGRAM(s) Oral every 24 hours  spironolactone 25 milliGRAM(s) Oral daily  tamsulosin 0.4 milliGRAM(s) Oral at bedtime    MEDICATIONS  (PRN):  acetaminophen   Tablet. 650 milliGRAM(s) Oral every 6 hours PRN Mild Pain (1 - 3)  dextrose Gel 1 Dose(s) Oral once PRN Blood Glucose LESS THAN 70 milliGRAM(s)/deciliter  glucagon  Injectable 1 milliGRAM(s) IntraMuscular once PRN Glucose LESS THAN 70 milligrams/deciliter  guaiFENesin    Syrup 200 milliGRAM(s) Oral every 6 hours PRN Cough  melatonin 3 milliGRAM(s) Oral at bedtime PRN Insomnia      Allergies    morphine (Hypotension)  oxycodone (Other)    Intolerances        Vital Signs Last 24 Hrs  T(C): 36.3 (22 Oct 2017 04:47), Max: 36.8 (21 Oct 2017 11:47)  T(F): 97.4 (22 Oct 2017 04:47), Max: 98.3 (21 Oct 2017 11:47)  HR: 68 (22 Oct 2017 08:31) (51 - 82)  BP: 100/57 (22 Oct 2017 08:31) (100/57 - 147/58)  BP(mean): --  RR: 18 (21 Oct 2017 16:51) (18 - 20)  SpO2: 100% (22 Oct 2017 04:47) (97% - 100%)      PHYSICAL EXAMINATION:    NECK:  Supple. No lymphadenopathy. Jugular venous pressure not elevated. Carotids equal.   HEART:   The cardiac impulse has a normal quality. Reg., Nl S1 and S2.  There are no murmurs, rubs or gallops noted  CHEST:  Chest crackles to auscultation. Normal respiratory effort.  ABDOMEN:  Soft and nontender.   EXTREMITIES:  There is no edema.       LABS:                        13.2   8.9   )-----------( 217      ( 22 Oct 2017 05:24 )             38.3     10-22    138  |  101  |  25<H>  ----------------------------<  149<H>  4.2   |  26  |  1.29    Ca    9.3      22 Oct 2017 05:24

## 2017-10-23 LAB
CULTURE RESULTS: SIGNIFICANT CHANGE UP
CULTURE RESULTS: SIGNIFICANT CHANGE UP
GLUCOSE BLDC GLUCOMTR-MCNC: 132 MG/DL — HIGH (ref 70–99)
GLUCOSE BLDC GLUCOMTR-MCNC: 165 MG/DL — HIGH (ref 70–99)
GLUCOSE BLDC GLUCOMTR-MCNC: 183 MG/DL — HIGH (ref 70–99)
GLUCOSE BLDC GLUCOMTR-MCNC: 201 MG/DL — HIGH (ref 70–99)
MAGNESIUM SERPL-MCNC: 1.6 MG/DL — SIGNIFICANT CHANGE UP (ref 1.6–2.6)
PHOSPHATE SERPL-MCNC: 3.3 MG/DL — SIGNIFICANT CHANGE UP (ref 2.5–4.5)
PLATELET # BLD AUTO: 232 K/UL — SIGNIFICANT CHANGE UP (ref 150–400)
POTASSIUM SERPL-MCNC: 4.3 MMOL/L — SIGNIFICANT CHANGE UP (ref 3.5–5.3)
POTASSIUM SERPL-SCNC: 4.3 MMOL/L — SIGNIFICANT CHANGE UP (ref 3.5–5.3)
SPECIMEN SOURCE: SIGNIFICANT CHANGE UP
SPECIMEN SOURCE: SIGNIFICANT CHANGE UP

## 2017-10-23 RX ADMIN — ATORVASTATIN CALCIUM 20 MILLIGRAM(S): 80 TABLET, FILM COATED ORAL at 21:00

## 2017-10-23 RX ADMIN — RIVAROXABAN 15 MILLIGRAM(S): KIT at 17:16

## 2017-10-23 RX ADMIN — SPIRONOLACTONE 25 MILLIGRAM(S): 25 TABLET, FILM COATED ORAL at 05:36

## 2017-10-23 RX ADMIN — Medication 1: at 08:05

## 2017-10-23 RX ADMIN — Medication 2.5 MILLIGRAM(S): at 17:16

## 2017-10-23 RX ADMIN — Medication 2.5 MILLIGRAM(S): at 05:36

## 2017-10-23 RX ADMIN — Medication 81 MILLIGRAM(S): at 11:33

## 2017-10-23 RX ADMIN — CEFTRIAXONE 100 GRAM(S): 500 INJECTION, POWDER, FOR SOLUTION INTRAMUSCULAR; INTRAVENOUS at 11:34

## 2017-10-23 RX ADMIN — CARVEDILOL PHOSPHATE 6.25 MILLIGRAM(S): 80 CAPSULE, EXTENDED RELEASE ORAL at 17:16

## 2017-10-23 RX ADMIN — AZITHROMYCIN 500 MILLIGRAM(S): 500 TABLET, FILM COATED ORAL at 11:33

## 2017-10-23 RX ADMIN — Medication 2: at 12:09

## 2017-10-23 RX ADMIN — CARVEDILOL PHOSPHATE 6.25 MILLIGRAM(S): 80 CAPSULE, EXTENDED RELEASE ORAL at 05:36

## 2017-10-23 RX ADMIN — TAMSULOSIN HYDROCHLORIDE 0.4 MILLIGRAM(S): 0.4 CAPSULE ORAL at 21:00

## 2017-10-23 NOTE — PROGRESS NOTE ADULT - SUBJECTIVE AND OBJECTIVE BOX
HOSPITALIST PROGRESS NOTE:  SUBJECTIVE:  PCP: All Physicians from [facility]  PMD: Gordo Joseph  Cardio: Dr. Clark  Pulm: Dr. Arroyo    Chief Complaint: Patient is a 77y old  Male who presents with a chief complaint of I have a spot on my right lung that's gotten bigger, so I'm having part of the right lung removed. (18 Oct 2017 15:06)    HPI:  76 yo male with PMH of CAD s/p CABG and stents, CHF (unknown EF) HTN, HLD, DM2, prostate CA, h/o lung malignancy s/p radiation, h/o RUL lobectomy for recurrent PNA, A. fib on xarelto brought in by EMS for episode of syncope. Pt does not recall the whole event. As per wife at bedside pt was brushing his teeth and putting in his dentures when she heard a loud noise from the bathroom. Patients son ran into the bathroom and found his father on the floor. He picked him up and carried him to the bed. Prior to episode pt states he felt weak but denies any dizziness, chest pain, palpitations, headaches, SOB, abd pain, N/V. As per wife pt developed some rhinorrhea and cough last night. No fevers or chills. No recent travel, no sick contacts. He was feeling lethargic and has not been eating since last night.   In ED pt given ceftriaxone and azithromycin for possible PNA. (18 Oct 2017 12:47)    10/19: Overnight patient has been bradycardic; Patient is requesting transfer to [facility] under Dr Landrum who is his cardiologist for continuity of care.  Patient has no complaints other than a cough.   10/20: yesterday transfer was not initiated by patients cardiologist. Todayspoke with patients cardio, Dr Amadou landrum and the transfer center and awaiting bed.  Over night patient had frequent pauses on tele. vitals have been stable   10/21:  transfer center called and patient will only going on monday;   Patient has no complaints. denies any dyspnea or CP  10/22: this morning patient was dizzy, BP was borderline low and Patients Vasotec was decreased; Hes voiding well.  10/23:  Patient has no complaints. Still awaiting a bed at [facility]. tele continues to have bradycardia with frequent pauses <3sec    Allergies:  morphine (Hypotension)  oxycodone (Other)    REVIEW OF SYSTEMS:  See HPI. All other review of systems is negative unless indicated above.     OBJECTIVE  Physical Exam:  Vital Signs Last 24 Hrs  T(C): 36.3 (23 Oct 2017 05:04), Max: 36.3 (22 Oct 2017 16:57)  T(F): 97.4 (23 Oct 2017 05:04), Max: 97.4 (22 Oct 2017 16:57)  HR: 55 (23 Oct 2017 05:04) (55 - 64)  BP: 114/41 (23 Oct 2017 05:04) (114/41 - 114/44)  BP(mean): 57 (22 Oct 2017 16:57) (57 - 57)  RR: 17 (22 Oct 2017 16:57) (17 - 17)  SpO2: 96% (23 Oct 2017 05:04) (96% - 98%)    Constitutional: NAD, awake and alert, well-developed  Neurological: AAO x 3, no focal deficits  HEENT: PERRLA, EOMI, MMM  Neck: Soft and supple, No LAD, No JVD  Respiratory: Breath sounds are clear bilaterally, No wheezing, rales or rhonchi  Cardiovascular: S1 and S2, regular rate and rhythm; no Murmurs, gallops or rubs  Gastrointestinal: Bowel Sounds present, soft, nontender, nondistended, no guarding, no rebound tenderness  Back: No CVA tenderness   Extremities: No peripheral edema  Vascular: 2+ peripheral pulses  Musculoskeletal: 5/5 strength b/l upper and lower extremities  Skin: No rashes  Breast: Deferred  Rectal: Deferred    MEDICATIONS  (STANDING):  aspirin enteric coated 81 milliGRAM(s) Oral daily  atorvastatin 20 milliGRAM(s) Oral at bedtime  azithromycin  IVPB 500 milliGRAM(s) IV Intermittent every 24 hours  carvedilol 6.25 milliGRAM(s) Oral every 12 hours  cefTRIAXone   IVPB 1 Gram(s) IV Intermittent every 24 hours  dextrose 5%. 1000 milliLiter(s) (50 mL/Hr) IV Continuous <Continuous>  dextrose 50% Injectable 12.5 Gram(s) IV Push once  dextrose 50% Injectable 25 Gram(s) IV Push once  dextrose 50% Injectable 25 Gram(s) IV Push once  digoxin     Tablet 0.125 milliGRAM(s) Oral daily  insulin lispro (HumaLOG) corrective regimen sliding scale   SubCutaneous three times a day before meals  insulin lispro (HumaLOG) corrective regimen sliding scale   SubCutaneous at bedtime  metolazone 2.5 milliGRAM(s) Oral daily  rivaroxaban 15 milliGRAM(s) Oral every 24 hours  spironolactone 25 milliGRAM(s) Oral daily  tamsulosin 0.4 milliGRAM(s) Oral at bedtime      LABS: All Labs Reviewed:                        12.8   6.8   )-----------( 194      ( 19 Oct 2017 05:47 )             37.0     10-19    136  |  100  |  15  ----------------------------<  141<H>  4.2   |  27  |  1.12    Ca    9.2      19 Oct 2017 05:47  Phos  2.6     10-19  Mg     1.4     10-19    TPro  7.0  /  Alb  3.5  /  TBili  0.9  /  DBili  x   /  AST  15  /  ALT  14  /  AlkPhos  54  10-18    PT/INR - ( 18 Oct 2017 11:00 )   PT: 15.1 sec;   INR: 1.39 ratio         PTT - ( 18 Oct 2017 11:00 )  PTT:32.0 sec  CARDIAC MARKERS ( 18 Oct 2017 20:07 )  0.044 ng/mL / x     / x     / x     / x      CARDIAC MARKERS ( 18 Oct 2017 13:53 )  0.024 ng/mL / x     / x     / x     / x      CARDIAC MARKERS ( 18 Oct 2017 11:00 )  0.023 ng/mL / x     / x     / x     / x          RADIOLOGY/EKG:    < from: Xray Chest 1 View AP/PA. (10.18.17 @ 11:14) >    IMPRESSION:    Findings consistent with mild to moderate right-sided pneumonia or   pneumonitis. Repeat radiography suggested in 4 weeks    < end of copied text >    < from: CT Head No Cont (10.18.17 @ 11:32) >    Impression:  1. no acute findings.        < end of copied text >    < from: CT Cervical Spine No Cont (10.18.17 @ 11:32) >    IMPRESSION:    No acute findings      < end of copied text >    < from: CT Chest No Cont (10.18.17 @ 13:35) >    IMPRESSION:     Large consolidation in the right lower lobe. Follow-up to complete   resolution with radiographic follow-up in 4 weeks.      < end of copied text >    < from: Transthoracic Echocardiogram (10.19.17 @ 10:19) >     Impression     Summary     Moderate (2+) mitral regurgitation is present.   Mild mitral annular calcification is present.   The left atrium is moderately dilated.   The left ventricle is normal in size, wall thickness. Moderate global LV   hypocontactility   Estimated left ventricular ejection fraction is 40 %.     Signature    < end of copied text > HOSPITALIST PROGRESS NOTE:  SUBJECTIVE:  PCP: [facility] Phys[facility] from [facility]  PMD: Gordo Joseph  Cardio: Dr. Clark  Pulm: Dr. Arroyo    Chief Complaint: Patient is a 77y old  Male who presents with a chief complaint of I have a spot on my right lung that's gotten bigger, so I'm having part of the right lung removed. (18 Oct 2017 15:06)    HPI:  76 yo male with PMH of CAD s/p CABG and stents, CHF (unknown EF) HTN, HLD, DM2, prostate CA, h/o lung malignancy s/p radiation, h/o RUL lobectomy for recurrent PNA, A. fib on xarelto brought in by EMS for episode of syncope. Pt does not recall the whole event. As per wife at bedside pt was brushing his teeth and putting in his dentures when she heard a loud noise from the bathroom. Patients son ran into the bathroom and found his father on the floor. He picked him up and carried him to the bed. Prior to episode pt states he felt weak but denies any dizziness, chest pain, palpitations, headaches, SOB, abd pain, N/V. As per wife pt developed some rhinorrhea and cough last night. No fevers or chills. No recent travel, no sick contacts. He was feeling lethargic and has not been eating since last night.   In ED pt given ceftriaxone and azithromycin for possible PNA. (18 Oct 2017 12:47)    10/19: Overnight patient has been bradycardic; Patient is requesting transfer to [facility] under Dr Landrum who is his cardiologist for continuity of care.  Patient has no complaints other than a cough.   10/20: yesterday transfer was not initiated by patients cardiologist. Todayspoke with patients cardio, Dr Amadou landrum and the transfer center and awaiting bed.  Over night patient had frequent pauses on tele. vitals have been stable   10/21:  transfer center called and patient will only going on monday;   Patient has no complaints. denies any dyspnea or CP  10/22: this morning patient was dizzy, BP was borderline low and Patients Vasotec was decreased; Hes voiding well.  10/23:  Patient has no complaints. Still awaiting a bed at [place]. tele continues to have bradycardia with frequent pauses <3sec    Allergies:  morphine (Hypotension)  oxycodone (Other)    REVIEW OF SYSTEMS:  See HPI. All other review of systems is negative unless indicated above.     OBJECTIVE  Physical Exam:  Vital Signs Last 24 Hrs  T(C): 36.4 (23 Oct 2017 10:56), Max: 36.4 (23 Oct 2017 10:56)  T(F): 97.5 (23 Oct 2017 10:56), Max: 97.5 (23 Oct 2017 10:56)  HR: 50 (23 Oct 2017 10:56) (50 - 64)  BP: 105/47 (23 Oct 2017 10:56) (105/47 - 114/44)  BP(mean): 57 (22 Oct 2017 16:57) (57 - 57)  RR: 20 (23 Oct 2017 10:56) (17 - 20)  SpO2: 99% (23 Oct 2017 10:56) (96% - 99%)    Constitutional: NAD, awake and alert, well-developed  Neurological: AAO x 3, no focal deficits  HEENT: PERRLA, EOMI, MMM  Neck: Soft and supple, No LAD, No JVD  Respiratory: Breath sounds are clear bilaterally, No wheezing, rales or rhonchi  Cardiovascular: S1 and S2, regular rate and rhythm; no Murmurs, gallops or rubs  Gastrointestinal: Bowel Sounds present, soft, nontender, nondistended, no guarding, no rebound tenderness  Back: No CVA tenderness   Extremities: No peripheral edema  Vascular: 2+ peripheral pulses  Musculoskeletal: 5/5 strength b/l upper and lower extremities  Skin: No rashes  Breast: Deferred  Rectal: Deferred    MEDICATIONS  (STANDING):  aspirin enteric coated 81 milliGRAM(s) Oral daily  atorvastatin 20 milliGRAM(s) Oral at bedtime  azithromycin  IVPB 500 milliGRAM(s) IV Intermittent every 24 hours  carvedilol 6.25 milliGRAM(s) Oral every 12 hours  cefTRIAXone   IVPB 1 Gram(s) IV Intermittent every 24 hours  dextrose 5%. 1000 milliLiter(s) (50 mL/Hr) IV Continuous <Continuous>  dextrose 50% Injectable 12.5 Gram(s) IV Push once  dextrose 50% Injectable 25 Gram(s) IV Push once  dextrose 50% Injectable 25 Gram(s) IV Push once  digoxin     Tablet 0.125 milliGRAM(s) Oral daily  insulin lispro (HumaLOG) corrective regimen sliding scale   SubCutaneous three times a day before meals  insulin lispro (HumaLOG) corrective regimen sliding scale   SubCutaneous at bedtime  metolazone 2.5 milliGRAM(s) Oral daily  rivaroxaban 15 milliGRAM(s) Oral every 24 hours  spironolactone 25 milliGRAM(s) Oral daily  tamsulosin 0.4 milliGRAM(s) Oral at bedtime      LABS: All Labs Reviewed:                        12.8   6.8   )-----------( 194      ( 19 Oct 2017 05:47 )             37.0     10-19    136  |  100  |  15  ----------------------------<  141<H>  4.2   |  27  |  1.12    Ca    9.2      19 Oct 2017 05:47  Phos  2.6     10-19  Mg     1.4     10-19    TPro  7.0  /  Alb  3.5  /  TBili  0.9  /  DBili  x   /  AST  15  /  ALT  14  /  AlkPhos  54  10-18    PT/INR - ( 18 Oct 2017 11:00 )   PT: 15.1 sec;   INR: 1.39 ratio         PTT - ( 18 Oct 2017 11:00 )  PTT:32.0 sec  CARDIAC MARKERS ( 18 Oct 2017 20:07 )  0.044 ng/mL / x     / x     / x     / x      CARDIAC MARKERS ( 18 Oct 2017 13:53 )  0.024 ng/mL / x     / x     / x     / x      CARDIAC MARKERS ( 18 Oct 2017 11:00 )  0.023 ng/mL / x     / x     / x     / x          RADIOLOGY/EKG:    < from: Xray Chest 1 View AP/PA. (10.18.17 @ 11:14) >    IMPRESSION:    Findings consistent with mild to moderate right-sided pneumonia or   pneumonitis. Repeat radiography suggested in 4 weeks    < end of copied text >    < from: CT Head No Cont (10.18.17 @ 11:32) >    Impression:  1. no acute findings.        < end of copied text >    < from: CT Cervical Spine No Cont (10.18.17 @ 11:32) >    IMPRESSION:    No acute findings      < end of copied text >    < from: CT Chest No Cont (10.18.17 @ 13:35) >    IMPRESSION:     Large consolidation in the right lower lobe. Follow-up to complete   resolution with radiographic follow-up in 4 weeks.      < end of copied text >    < from: Transthoracic Echocardiogram (10.19.17 @ 10:19) >     Impression     Summary     Moderate (2+) mitral regurgitation is present.   Mild mitral annular calcification is present.   The left atrium is moderately dilated.   The left ventricle is normal in size, wall thickness. Moderate global LV   hypocontactility   Estimated left ventricular ejection fraction is 40 %.     Signature    < end of copied text >

## 2017-10-23 NOTE — PROGRESS NOTE ADULT - SUBJECTIVE AND OBJECTIVE BOX
Subjective:    pat much better, passing urine, no respiratory ditress.    Home Medications:  acetaminophen 325 mg oral tablet: 2 tab(s) orally every 6 hours, As needed, Mild Pain (1 - 3) (19 Oct 2017 13:27)  aspirin 81 mg oral delayed release tablet: 1 tab(s) orally once a day (18 Oct 2017 12:43)  atorvastatin 20 mg oral tablet: 1 tab(s) orally once a day (18 Oct 2017 12:43)  carvedilol 6.25 mg oral tablet: 1 tab(s) orally 2 times a day (18 Oct 2017 12:43)  cefTRIAXone: 1 gram(s) intravenous once a day (19 Oct 2017 13:27)  enalapril 2.5 mg oral tablet: 1 tab(s) orally every 12 hours (22 Oct 2017 13:02)  guaiFENesin 100 mg/5 mL oral liquid: 10 milliliter(s) orally every 6 hours, As needed, Cough (22 Oct 2017 13:02)  insulin lispro 100 units/mL subcutaneous solution:  subcutaneous 3 times a day (before meals); 1 Unit(s) if Glucose 151 - 200  2 Unit(s) if Glucose 201 - 250  3 Unit(s) if Glucose 251 - 300  4 Unit(s) if Glucose 301 - 350  5 Unit(s) if Glucose 351 - 400  6 Unit(s) if Glucose Greater Than 400 (19 Oct 2017 13:27)  insulin lispro 100 units/mL subcutaneous solution:  subcutaneous once a day (at bedtime); 0 Unit(s) if Glucose 0 - 250  1 Unit(s) if Glucose 251 - 300  2 Unit(s) if Glucose 301 - 350  3 Unit(s) if Glucose 351 - 400  4 Unit(s) if Glucose Greater Than 400 (19 Oct 2017 13:27)  metOLazone 2.5 mg oral tablet: 1 tab(s) orally once a day (18 Oct 2017 12:43)  rivaroxaban 10 mg oral tablet: 1 tab(s) orally once a day (18 Oct 2017 12:43)  spironolactone 25 mg oral tablet: 1 tab(s) orally once a day (18 Oct 2017 12:43)  tamsulosin 0.4 mg oral capsule: 1 cap(s) orally once a day (at bedtime) (19 Oct 2017 13:27)    MEDICATIONS  (STANDING):  aspirin enteric coated 81 milliGRAM(s) Oral daily  atorvastatin 20 milliGRAM(s) Oral at bedtime  azithromycin   Tablet 500 milliGRAM(s) Oral daily  carvedilol 6.25 milliGRAM(s) Oral every 12 hours  cefTRIAXone   IVPB 1 Gram(s) IV Intermittent every 24 hours  dextrose 5%. 1000 milliLiter(s) (50 mL/Hr) IV Continuous <Continuous>  dextrose 50% Injectable 12.5 Gram(s) IV Push once  dextrose 50% Injectable 25 Gram(s) IV Push once  dextrose 50% Injectable 25 Gram(s) IV Push once  enalapril 2.5 milliGRAM(s) Oral every 12 hours  insulin lispro (HumaLOG) corrective regimen sliding scale   SubCutaneous three times a day before meals  insulin lispro (HumaLOG) corrective regimen sliding scale   SubCutaneous at bedtime  metolazone 2.5 milliGRAM(s) Oral daily  rivaroxaban 15 milliGRAM(s) Oral every 24 hours  spironolactone 25 milliGRAM(s) Oral daily  tamsulosin 0.4 milliGRAM(s) Oral at bedtime    MEDICATIONS  (PRN):  acetaminophen   Tablet. 650 milliGRAM(s) Oral every 6 hours PRN Mild Pain (1 - 3)  dextrose Gel 1 Dose(s) Oral once PRN Blood Glucose LESS THAN 70 milliGRAM(s)/deciliter  glucagon  Injectable 1 milliGRAM(s) IntraMuscular once PRN Glucose LESS THAN 70 milligrams/deciliter  guaiFENesin    Syrup 200 milliGRAM(s) Oral every 6 hours PRN Cough  melatonin 3 milliGRAM(s) Oral at bedtime PRN Insomnia      Allergies    morphine (Hypotension)  oxycodone (Other)    Intolerances        Vital Signs Last 24 Hrs  T(C): 36.3 (23 Oct 2017 05:04), Max: 36.3 (22 Oct 2017 16:57)  T(F): 97.4 (23 Oct 2017 05:04), Max: 97.4 (22 Oct 2017 16:57)  HR: 55 (23 Oct 2017 05:04) (55 - 64)  BP: 114/41 (23 Oct 2017 05:04) (114/41 - 114/44)  BP(mean): 57 (22 Oct 2017 16:57) (57 - 57)  RR: 17 (22 Oct 2017 16:57) (17 - 17)  SpO2: 96% (23 Oct 2017 05:04) (96% - 98%)      PHYSICAL EXAMINATION:    NECK:  Supple. No lymphadenopathy. Jugular venous pressure not elevated. Carotids equal.   HEART:   The cardiac impulse has a normal quality. Reg., Nl S1 and S2.  There are no murmurs, rubs or gallops noted  CHEST:  Chest crackles to auscultation. Normal respiratory effort.  ABDOMEN:  Soft and nontender.   EXTREMITIES:  There is no edema.       LABS:                        x      x     )-----------( 232      ( 23 Oct 2017 05:41 )             x        10-23    x   |  x   |  x   ----------------------------<  x   4.3   |  x   |  x     Ca    9.3      22 Oct 2017 05:24  Phos  3.3     10-23  Mg     1.6     10-23

## 2017-10-23 NOTE — PROGRESS NOTE ADULT - SUBJECTIVE AND OBJECTIVE BOX
CHIEF COMPLAINT: Patient is a 77y old  Male who presents with a chief complaint of I have a spot on my right lung that's gotten bigger, so I'm having part of the right lung removed. (19 Oct 2017 13:18)      HPI:  76 yo male with PMH of CAD s/p CABG and stents, CHF (unknown EF) HTN, HLD, DM2, prostate CA, h/o lung malignancy s/p radiation, h/o RUL lobectomy for recurrent PNA, A. fib on xarelto brought in by EMS for episode of syncope. Pt does not recall the whole event. As per wife at bedside pt was brushing his teeth and putting in his dentures when she heard a loud noise from the bathroom. Patients son ran into the bathroom and found his father on the floor. He picked him up and carried him to the bed. Prior to episode pt states he felt weak but denies any dizziness, chest pain, palpitations, headaches, SOB, abd pain, N/V. As per wife pt developed some rhinorrhea and cough last night. No fevers or chills. No recent travel, no sick contacts. He was feeling lethargic and has not been eating since last night.     In ED pt given ceftriaxone and azithromycin for possible PNA. (18 Oct 2017 12:47)    10/19-patient with significant cardiac risk factors, with telemetry-showing ventricular arrhythmia(AIVR), pauses of over 2.5sec.  Has R infiltrate-unsure if pneumonia or somthing more ominous    10/21 no acute issues overnight    10/22 Still  with bradycardia and PVCs    10/23-patient feeling ok, still with pauses, PVC's, couplets and NSVT.  PAtient with significant CAD/CHF with ECHO showing global hypokinesis with EF 40% and moderate MR.  PAtient expecting to go to [place] today,.  If patient doesn't go, will need cardiac cath and afterwards will need EPS for possible PPM/ICD.   Has afib with widened QRS-up to EP if BI-V needed at 40% EF.    PMHx: PAST MEDICAL & SURGICAL HISTORY:  Osteoarthritis  Lung cancer  DM (diabetes mellitus)  Cataracts, bilateral  Prostate ca  BPH (benign prostatic hyperplasia)  Carotid stenosis, left  CAD (coronary artery disease): S/P coronary artery stents 2002, 2003  HLD (hyperlipidemia)  HTN (hypertension)  S/P CABG x 3: 1997  S/P lobectomy of lung: right upper 1998  S/P TURP  Stented coronary artery: 2002, 2003  S/P carotid endarterectomy: left, S/P left carotid stent 2000      Allergies: Allergies    morphine (Hypotension)  oxycodone (Other)    Intolerances          REVIEW OF SYSTEMS:  RESPIRATORY: No cough, wheezing, hemoptysis; No shortness of breath  CARDIOVASCULAR: No chest pain or palpitations  GASTROINTESTINAL: No abdominal or epigastric pain. No nausea, vomiting, or hematemesis; No diarrhea or constipation. No melena or hematochezia.      Vital Signs Last 24 Hrs  T(C): 36.3 (23 Oct 2017 05:04), Max: 36.3 (22 Oct 2017 16:57)  T(F): 97.4 (23 Oct 2017 05:04), Max: 97.4 (22 Oct 2017 16:57)  HR: 55 (23 Oct 2017 05:04) (55 - 68)  BP: 114/41 (23 Oct 2017 05:04) (100/57 - 114/44)  BP(mean): 57 (22 Oct 2017 16:57) (57 - 57)  RR: 17 (22 Oct 2017 16:57) (17 - 17)  SpO2: 96% (23 Oct 2017 05:04) (96% - 98%)    I&O's Summary          PHYSICAL EXAM:   Neck: JVD  Respiratory: Breath sounds are clear bilaterally, No wheezing, rales or rhonchi  Cardiovascular: S1 and S2, regular rate and rhythm, no Murmurs, gallops or rubs  Gastrointestinal: Bowel Sounds present, soft, nontender, nondistended, no guarding, no rebound  Extremities: No peripheral edema  Vascular: 2+ peripheral pulses  Neurological: A/O x 3, no focal deficits  Musculoskeletal: 5/5 strength b/l upper and lower extremities      MEDICATIONS:  MEDICATIONS  (STANDING):  aspirin enteric coated 81 milliGRAM(s) Oral daily  atorvastatin 20 milliGRAM(s) Oral at bedtime  azithromycin   Tablet 500 milliGRAM(s) Oral daily  carvedilol 6.25 milliGRAM(s) Oral every 12 hours  cefTRIAXone   IVPB 1 Gram(s) IV Intermittent every 24 hours  dextrose 5%. 1000 milliLiter(s) (50 mL/Hr) IV Continuous <Continuous>  dextrose 50% Injectable 12.5 Gram(s) IV Push once  dextrose 50% Injectable 25 Gram(s) IV Push once  dextrose 50% Injectable 25 Gram(s) IV Push once  enalapril 2.5 milliGRAM(s) Oral every 12 hours  insulin lispro (HumaLOG) corrective regimen sliding scale   SubCutaneous three times a day before meals  insulin lispro (HumaLOG) corrective regimen sliding scale   SubCutaneous at bedtime  metolazone 2.5 milliGRAM(s) Oral daily  rivaroxaban 15 milliGRAM(s) Oral every 24 hours  spironolactone 25 milliGRAM(s) Oral daily  tamsulosin 0.4 milliGRAM(s) Oral at bedtime      LABS: All Labs Reviewed:                        x      x     )-----------( 232      ( 23 Oct 2017 05:41 )             x        10-23    x   |  x   |  x   ----------------------------<  x   4.3   |  x   |  x     Ca    9.3      22 Oct 2017 05:24  Phos  3.3     10-23  Mg     1.6     10-23            Blood Culture: Organism --  Gram Stain Blood -- Gram Stain --  Specimen Source .Blood Blood  Culture-Blood --    Organism --  Gram Stain Blood -- Gram Stain --  Specimen Source .Blood Blood  Culture-Blood --        lipid profile          10-18 @ 13:53  cholesterol 89 mg/dL  direct LDL 32 mg/dL  HDL 34 mg/dL  HDL/total cholesterol --  Triglyceride, serum 116 mg/dL

## 2017-10-24 VITALS
HEART RATE: 50 BPM | OXYGEN SATURATION: 98 % | SYSTOLIC BLOOD PRESSURE: 115 MMHG | DIASTOLIC BLOOD PRESSURE: 50 MMHG | RESPIRATION RATE: 19 BRPM | TEMPERATURE: 97 F

## 2017-10-24 LAB
GLUCOSE BLDC GLUCOMTR-MCNC: 143 MG/DL — HIGH (ref 70–99)
GLUCOSE BLDC GLUCOMTR-MCNC: 162 MG/DL — HIGH (ref 70–99)
GLUCOSE BLDC GLUCOMTR-MCNC: 163 MG/DL — HIGH (ref 70–99)
GLUCOSE BLDC GLUCOMTR-MCNC: 211 MG/DL — HIGH (ref 70–99)
HCT VFR BLD CALC: 37.2 % — LOW (ref 39–50)
HGB BLD-MCNC: 13 G/DL — SIGNIFICANT CHANGE UP (ref 13–17)
MAGNESIUM SERPL-MCNC: 1.5 MG/DL — LOW (ref 1.6–2.6)
PHOSPHATE SERPL-MCNC: 2.8 MG/DL — SIGNIFICANT CHANGE UP (ref 2.5–4.5)
PLATELET # BLD AUTO: 236 K/UL — SIGNIFICANT CHANGE UP (ref 150–400)
POTASSIUM SERPL-MCNC: 4.4 MMOL/L — SIGNIFICANT CHANGE UP (ref 3.5–5.3)
POTASSIUM SERPL-SCNC: 4.4 MMOL/L — SIGNIFICANT CHANGE UP (ref 3.5–5.3)

## 2017-10-24 RX ORDER — CEFUROXIME AXETIL 250 MG
1 TABLET ORAL
Qty: 0 | Refills: 0 | COMMUNITY
Start: 2017-10-24

## 2017-10-24 RX ORDER — CEFUROXIME AXETIL 250 MG
500 TABLET ORAL EVERY 12 HOURS
Qty: 0 | Refills: 0 | Status: DISCONTINUED | OUTPATIENT
Start: 2017-10-24 | End: 2017-10-24

## 2017-10-24 RX ORDER — LACTOBACILLUS ACIDOPHILUS 100MM CELL
1 CAPSULE ORAL
Qty: 0 | Refills: 0 | COMMUNITY
Start: 2017-10-24

## 2017-10-24 RX ORDER — LACTOBACILLUS ACIDOPHILUS 100MM CELL
1 CAPSULE ORAL
Qty: 0 | Refills: 0 | Status: DISCONTINUED | OUTPATIENT
Start: 2017-10-24 | End: 2017-10-24

## 2017-10-24 RX ADMIN — RIVAROXABAN 15 MILLIGRAM(S): KIT at 17:06

## 2017-10-24 RX ADMIN — Medication 2: at 17:06

## 2017-10-24 RX ADMIN — Medication 2.5 MILLIGRAM(S): at 05:14

## 2017-10-24 RX ADMIN — Medication 500 MILLIGRAM(S): at 17:06

## 2017-10-24 RX ADMIN — SPIRONOLACTONE 25 MILLIGRAM(S): 25 TABLET, FILM COATED ORAL at 05:14

## 2017-10-24 RX ADMIN — CARVEDILOL PHOSPHATE 6.25 MILLIGRAM(S): 80 CAPSULE, EXTENDED RELEASE ORAL at 17:06

## 2017-10-24 RX ADMIN — Medication 1 TABLET(S): at 13:49

## 2017-10-24 RX ADMIN — Medication 2.5 MILLIGRAM(S): at 17:06

## 2017-10-24 RX ADMIN — Medication 1: at 11:38

## 2017-10-24 RX ADMIN — TAMSULOSIN HYDROCHLORIDE 0.4 MILLIGRAM(S): 0.4 CAPSULE ORAL at 21:05

## 2017-10-24 RX ADMIN — CARVEDILOL PHOSPHATE 6.25 MILLIGRAM(S): 80 CAPSULE, EXTENDED RELEASE ORAL at 05:14

## 2017-10-24 RX ADMIN — ATORVASTATIN CALCIUM 20 MILLIGRAM(S): 80 TABLET, FILM COATED ORAL at 21:05

## 2017-10-24 RX ADMIN — Medication 81 MILLIGRAM(S): at 11:23

## 2017-10-24 NOTE — PROGRESS NOTE ADULT - SUBJECTIVE AND OBJECTIVE BOX
HOSPITALIST PROGRESS NOTE:  SUBJECTIVE:  PCP: All Physicians from [facility]  PMD: Gordo Joseph  Cardio: Dr. Clark  Pulm: Dr. Arroyo    Chief Complaint: Patient is a 77y old  Male who presents with a chief complaint of I have a spot on my right lung that's gotten bigger, so I'm having part of the right lung removed. (18 Oct 2017 15:06)    HPI:  76 yo male with PMH of CAD s/p CABG and stents, CHF (unknown EF) HTN, HLD, DM2, prostate CA, h/o lung malignancy s/p radiation, h/o RUL lobectomy for recurrent PNA, A. fib on xarelto brought in by EMS for episode of syncope. Pt does not recall the whole event. As per wife at bedside pt was brushing his teeth and putting in his dentures when she heard a loud noise from the bathroom. Patients son ran into the bathroom and found his father on the floor. He picked him up and carried him to the bed. Prior to episode pt states he felt weak but denies any dizziness, chest pain, palpitations, headaches, SOB, abd pain, N/V. As per wife pt developed some rhinorrhea and cough last night. No fevers or chills. No recent travel, no sick contacts. He was feeling lethargic and has not been eating since last night.   In ED pt given ceftriaxone and azithromycin for possible PNA. (18 Oct 2017 12:47)    10/19: Overnight patient has been bradycardic; Patient is requesting transfer to [facility] under Dr Landrum who is his cardiologist for continuity of care.  Patient has no complaints other than a cough.   10/20: yesterday transfer was not initiated by patients cardiologist. Todayspoke with patients cardio, Dr Amadou landrum and the transfer center and awaiting bed.  Over night patient had frequent pauses on tele. vitals have been stable   10/21:  transfer center called and patient will only going on monday;   Patient has no complaints. denies any dyspnea or CP  10/22: this morning patient was dizzy, BP was borderline low and Patients Vasotec was decreased; Hes voiding well.  10/23:  Patient has no complaints. Still awaiting a bed at [place]. tele continues to have bradycardia with frequent pauses <3sec  10/24:  Patient has no complaint; Dizziness this morning but BP stable    Allergies:  morphine (Hypotension)  oxycodone (Other)    REVIEW OF SYSTEMS:  See HPI. All other review of systems is negative unless indicated above.     OBJECTIVE  Physical Exam:  Vital Signs Last 24 Hrs  T(C): 36.3 (24 Oct 2017 10:31), Max: 36.9 (23 Oct 2017 17:06)  T(F): 97.3 (24 Oct 2017 10:31), Max: 98.4 (23 Oct 2017 17:06)  HR: 60 (24 Oct 2017 10:31) (53 - 65)  BP: 117/52 (24 Oct 2017 10:31) (114/62 - 139/61)  BP(mean): --  RR: 19 (24 Oct 2017 05:09) (18 - 19)  SpO2: 96% (24 Oct 2017 10:31) (96% - 99%)    Constitutional: NAD, awake and alert, well-developed  Neurological: AAO x 3, no focal deficits  HEENT: PERRLA, EOMI, MMM  Neck: Soft and supple, No LAD, No JVD  Respiratory: Breath sounds are clear bilaterally, No wheezing, rales or rhonchi  Cardiovascular: S1 and S2, regular rate and rhythm; no Murmurs, gallops or rubs  Gastrointestinal: Bowel Sounds present, soft, nontender, nondistended, no guarding, no rebound tenderness  Back: No CVA tenderness   Extremities: No peripheral edema  Vascular: 2+ peripheral pulses  Musculoskeletal: 5/5 strength b/l upper and lower extremities  Skin: No rashes  Breast: Deferred  Rectal: Deferred    MEDICATIONS  (STANDING):  aspirin enteric coated 81 milliGRAM(s) Oral daily  atorvastatin 20 milliGRAM(s) Oral at bedtime  azithromycin  IVPB 500 milliGRAM(s) IV Intermittent every 24 hours  carvedilol 6.25 milliGRAM(s) Oral every 12 hours  cefTRIAXone   IVPB 1 Gram(s) IV Intermittent every 24 hours  dextrose 5%. 1000 milliLiter(s) (50 mL/Hr) IV Continuous <Continuous>  dextrose 50% Injectable 12.5 Gram(s) IV Push once  dextrose 50% Injectable 25 Gram(s) IV Push once    Lab Results:  CBC  CBC Full  -  ( 24 Oct 2017 04:43 )  WBC Count : x  Hemoglobin : 13.0 g/dL  Hematocrit : 37.2 %  Platelet Count - Automated : 236 K/uL  Mean Cell Volume : x  Mean Cell Hemoglobin : x  Mean Cell Hemoglobin Concentration : x  Auto Neutrophil # : x  Auto Lymphocyte # : x  Auto Monocyte # : x  Auto Eosinophil # : x  Auto Basophil # : x  Auto Neutrophil % : x  Auto Lymphocyte % : x  Auto Monocyte % : x  Auto Eosinophil % : x  Auto Basophil % : x    .		Differential:	[] Automated		[] Manual  Chemistry                        13.0   x     )-----------( 236      ( 24 Oct 2017 04:43 )             37.2     10-24    x   |  x   |  x   ----------------------------<  x   4.4   |  x   |  x     Phos  2.8     10-24  Mg     1.5     10-24        MICROBIOLOGY/CULTURES:  Culture Results:   No growth at 5 days. (10-18 @ 13:53)  Culture Results:   No growth at 5 days. (10-18 @ 11:00)      RADIOLOGY RESULTS:  dextrose 50% Injectable 25 Gram(s) IV Push once  digoxin     Tablet 0.125 milliGRAM(s) Oral daily  insulin lispro (HumaLOG) corrective regimen sliding scale   SubCutaneous three times a day before meals  insulin lispro (HumaLOG) corrective regimen sliding scale   SubCutaneous at bedtime  metolazone 2.5 milliGRAM(s) Oral daily  rivaroxaban 15 milliGRAM(s) Oral every 24 hours  spironolactone 25 milliGRAM(s) Oral daily  tamsulosin 0.4 milliGRAM(s) Oral at bedtime           PTT - ( 18 Oct 2017 11:00 )  PTT:32.0 sec  CARDIAC MARKERS ( 18 Oct 2017 20:07 )  0.044 ng/mL / x     / x     / x     / x      CARDIAC MARKERS ( 18 Oct 2017 13:53 )  0.024 ng/mL / x     / x     / x     / x      CARDIAC MARKERS ( 18 Oct 2017 11:00 )  0.023 ng/mL / x     / x     / x     / x          RADIOLOGY/EKG:    < from: Xray Chest 1 View AP/PA. (10.18.17 @ 11:14) >    IMPRESSION:    Findings consistent with mild to moderate right-sided pneumonia or   pneumonitis. Repeat radiography suggested in 4 weeks    < end of copied text >    < from: CT Head No Cont (10.18.17 @ 11:32) >    Impression:  1. no acute findings.        < end of copied text >    < from: CT Cervical Spine No Cont (10.18.17 @ 11:32) >    IMPRESSION:    No acute findings      < end of copied text >    < from: CT Chest No Cont (10.18.17 @ 13:35) >    IMPRESSION:     Large consolidation in the right lower lobe. Follow-up to complete   resolution with radiographic follow-up in 4 weeks.      < end of copied text >    < from: Transthoracic Echocardiogram (10.19.17 @ 10:19) >     Impression     Summary     Moderate (2+) mitral regurgitation is present.   Mild mitral annular calcification is present.   The left atrium is moderately dilated.   The left ventricle is normal in size, wall thickness. Moderate global LV   hypocontactility   Estimated left ventricular ejection fraction is 40 %.     Signature    < end of copied text >

## 2017-10-24 NOTE — PROGRESS NOTE ADULT - SUBJECTIVE AND OBJECTIVE BOX
CHIEF COMPLAINT: Patient is a 77y old  Male who presents with a chief complaint of I have a spot on my right lung that's gotten bigger, so I'm having part of the right lung removed. (19 Oct 2017 13:18)      HPI:  76 yo male with PMH of CAD s/p CABG and stents, CHF (unknown EF) HTN, HLD, DM2, prostate CA, h/o lung malignancy s/p radiation, h/o RUL lobectomy for recurrent PNA, A. fib on xarelto brought in by EMS for episode of syncope. Pt does not recall the whole event. As per wife at bedside pt was brushing his teeth and putting in his dentures when she heard a loud noise from the bathroom. Patients son ran into the bathroom and found his father on the floor. He picked him up and carried him to the bed. Prior to episode pt states he felt weak but denies any dizziness, chest pain, palpitations, headaches, SOB, abd pain, N/V. As per wife pt developed some rhinorrhea and cough last night. No fevers or chills. No recent travel, no sick contacts. He was feeling lethargic and has not been eating since last night.     In ED pt given ceftriaxone and azithromycin for possible PNA. (18 Oct 2017 12:47)    10/19-patient with significant cardiac risk factors, with telemetry-showing ventricular arrhythmia(AIVR), pauses of over 2.5sec.  Has R infiltrate-unsure if pneumonia or somthing more ominous    10/21 no acute issues overnight    10/22 Still  with bradycardia and PVCs    10/23-patient feeling ok, still with pauses, PVC's, couplets and NSVT.  PAtient with significant CAD/CHF with ECHO showing global hypokinesis with EF 40% and moderate MR.  PAtient expecting to go to [facility] today,.  If patient doesn't go, will need cardiac cath and afterwards will need EPS for possible PPM/ICD.   Has afib with widened QRS-up to EP if BI-V needed at 40% EF.    10/24-patient still refusing care here.      PMHx: PAST MEDICAL & SURGICAL HISTORY:  Osteoarthritis  Lung cancer  DM (diabetes mellitus)  Cataracts, bilateral  Prostate ca  BPH (benign prostatic hyperplasia)  Carotid stenosis, left  CAD (coronary artery disease): S/P coronary artery stents 2002, 2003  HLD (hyperlipidemia)  HTN (hypertension)  S/P CABG x 3: 1997  S/P lobectomy of lung: right upper 1998  S/P TURP  Stented coronary artery: 2002, 2003  S/P carotid endarterectomy: left, S/P left carotid stent 2000      Allergies: Allergies    morphine (Hypotension)  oxycodone (Other)    Intolerances          REVIEW OF SYSTEMS:    CONSTITUTIONAL: No weakness, fevers or chills  EYES/ENT: No visual changes;  No vertigo or throat pain   NECK: No pain or stiffness  RESPIRATORY: No cough, wheezing, hemoptysis; No shortness of breath  CARDIOVASCULAR: No chest pain or palpitations  GASTROINTESTINAL: No abdominal or epigastric pain. No nausea, vomiting, or hematemesis; No diarrhea or constipation. No melena or hematochezia.  GENITOURINARY: No dysuria, frequency or hematuria  NEUROLOGICAL: No numbness or weakness  SKIN: No itching, burning, rashes, or lesions   All other review of systems is negative unless indicated above    Vital Signs Last 24 Hrs  T(C): 36.4 (24 Oct 2017 05:09), Max: 36.9 (23 Oct 2017 17:06)  T(F): 97.6 (24 Oct 2017 05:09), Max: 98.4 (23 Oct 2017 17:06)  HR: 65 (24 Oct 2017 08:06) (50 - 65)  BP: 114/62 (24 Oct 2017 08:06) (105/47 - 139/61)  BP(mean): --  RR: 19 (24 Oct 2017 05:09) (18 - 20)  SpO2: 98% (24 Oct 2017 05:09) (98% - 99%)    I&O's Summary    23 Oct 2017 07:01  -  24 Oct 2017 07:00  --------------------------------------------------------  IN: 280 mL / OUT: 0 mL / NET: 280 mL            PHYSICAL EXAM:   Constitutional: NAD, awake and alert, well-developed  HEENT: PERR, EOMI, Normal Hearing, MMM  Neck: Soft and supple, No LAD, No JVD  Respiratory: Breath sounds are clear bilaterally, No wheezing, rales or rhonchi  Cardiovascular: S1 and S2, regular rate and rhythm, no Murmurs, gallops or rubs  Gastrointestinal: Bowel Sounds present, soft, nontender, nondistended, no guarding, no rebound  Extremities: No peripheral edema  Vascular: 2+ peripheral pulses  Neurological: A/O x 3, no focal deficits  Musculoskeletal: 5/5 strength b/l upper and lower extremities  Skin: No rashes    MEDICATIONS:  MEDICATIONS  (STANDING):  aspirin enteric coated 81 milliGRAM(s) Oral daily  atorvastatin 20 milliGRAM(s) Oral at bedtime  azithromycin   Tablet 500 milliGRAM(s) Oral daily  carvedilol 6.25 milliGRAM(s) Oral every 12 hours  cefTRIAXone   IVPB 1 Gram(s) IV Intermittent every 24 hours  dextrose 5%. 1000 milliLiter(s) (50 mL/Hr) IV Continuous <Continuous>  dextrose 50% Injectable 12.5 Gram(s) IV Push once  dextrose 50% Injectable 25 Gram(s) IV Push once  dextrose 50% Injectable 25 Gram(s) IV Push once  enalapril 2.5 milliGRAM(s) Oral every 12 hours  insulin lispro (HumaLOG) corrective regimen sliding scale   SubCutaneous three times a day before meals  insulin lispro (HumaLOG) corrective regimen sliding scale   SubCutaneous at bedtime  metolazone 2.5 milliGRAM(s) Oral daily  rivaroxaban 15 milliGRAM(s) Oral every 24 hours  spironolactone 25 milliGRAM(s) Oral daily  tamsulosin 0.4 milliGRAM(s) Oral at bedtime      LABS: All Labs Reviewed:                        13.0   x     )-----------( 236      ( 24 Oct 2017 04:43 )             37.2     10-24    x   |  x   |  x   ----------------------------<  x   4.4   |  x   |  x     Phos  2.8     10-24  Mg     1.5     10-24

## 2017-10-24 NOTE — DIETITIAN INITIAL EVALUATION ADULT. - OTHER INFO
Pt with good po intake and appetite at this time. Current diet Consistent carbohydrate with evening snack. BGM's fluctuating 132-201mg/dl. Skin intact with no edema noted. Possible transfer today to [facility] for PPM. Denies any difficulty chewing or swallowing. Diet instruction/educational materials provided and reviewed on Consistent carb. counting nutrition therapy.

## 2017-10-24 NOTE — PROGRESS NOTE ADULT - PROVIDER SPECIALTY LIST ADULT
Cardiology
Hospitalist
Pulmonology
Hospitalist
Cardiology
Hospitalist

## 2017-10-24 NOTE — PROGRESS NOTE ADULT - ASSESSMENT
#Syncope 2ndry to bradycardia with frequent pauses and Pneumonia   - continue tele -  AFIB, evidence of AIVR, multiple < 3 sec pause   - EKG with multiple PVCs, ST-T wave abnormalities  - CT head/C-spine - Neg  - continue ASA, BB, statin  - troponin X 3 negative  - cardio consult appreciated -  - EP consult appreciated   - patient at this time is refusing further treatment here and is awating for transfer    #RLL PNA - community acquired with superimposed Viral infection  - +RVP with rhinovirus  - CT chest Large consolidation in the right lower lobe.  - will treat for gram negative bacteria  - continue ceftriaxone#7  S/P azithromycin  - f/u cultures     #Systolic CHF - compensated  -echo - Moderate global LV hypocontactility; Estimated left ventricular ejection fraction is 40 %.  -Started on ACEI  -Monitor I&o,   -continue BB, metolazone, aldactone   -I&O, Daily weights    #A. fib  - currently bradycardia  - Hold digoxin and continue coreg  - dig level WNL  - continue xarelto    #CKD stage 3  - unknown baseline Cr (Cr 1.8 5/2016)  - monitor renal panel    #CAD s/p CABG and stents  - no active chest pain  - continue ASA, BB, statin  #DM2  - HbA1c 7.9  - hold metformin  - ISS  - diabetic diet    #HTN  - continue home meds    #HLD  - continue statin    #DVT prophylaxis  - on xarelto    *Discharge Instructions/Follow Up/Pending Labs:  -Transfer patient to [facility] under Dr sue Landrum; Discussed with Dr landrum and transfer center, awaiting for bed.  transfer consent done.      Total time: 45 minutes
patient with CAD/CHF and afib with evidence of conduction disease, abnl complexes, widened QRS with EF 40% with syncope and minimal troponin elevation  1-patient to be transferred to [place]- still currently refusing intervention or workup here.    Will sign off at this time.  Patient not to come off telemetry unless ok with EPS, signs out AMA or transferred to [place]  Please reconsult if situation changes
#Syncope 2ndry to bradycardia and Pneumonia   - continue tele -  -evidence of AIVR, < 3 sec pause   - EKG with multiple PVCs, ST-T wave abnormalities  - CT head/C-spine - Neg  - continue ASA, BB, statin  - serial cardiac enzymes  - FU echo  - cardio consult appreciated   - EP consult appreciated   - check orthostatics    #RLL PNA - community acquired with superimposed Viral infection  - +RVP with rhinovirus  - CT chest Large consolidation in the right lower lobe.  - will treat for gram negative bacteria  - continue ceftriaxone and azithromycin  - f/u cultures     #A. fib  - currently bradycardia  - continue digoxin and coreg  - dig level WNL  - continue xarelto    #CKD stage 3  - unknown baseline Cr (Cr 1.8 5/2016)  - monitor renal panel    #CAD s/p CABG and stents  - no active chest pain  - continue ASA, BB, statin  #CHF - likely systolic - compensated  - continue BB, metolazone, aldactone  - check echo    #DM2  - HbA1c 7.9  - hold metformin  - ISS  - diabetic diet    #HTN  - continue home meds    #HLD  - continue statin    #DVT prophylaxis  - on xarelto    *Discharge Instructions/Follow Up/Pending Labs:  -Transfer patient to [facility] under Dr Damon;  transfer consent done.     Total time: 45 minutes
#Syncope 2ndry to bradycardia and Pneumonia   - continue tele -  AFIB, evidence of AIVR, multiple < 3 sec pause   - EKG with multiple PVCs, ST-T wave abnormalities  - CT head/C-spine - Neg  - continue ASA, BB, statin  - serial cardiac enzymes  - echo - Moderate global LV hypocontactility; Estimated left ventricular ejection fraction is 40 %.  - cardio consult appreciated   - EP consult appreciated     #RLL PNA - community acquired with superimposed Viral infection  - +RVP with rhinovirus  - CT chest Large consolidation in the right lower lobe.  - will treat for gram negative bacteria  - continue ceftriaxone and azithromycin  - f/u cultures     #Systolic CHF - compensated  -Started on ACEI  -Monitor I&o, Daily weights    #A. fib  - currently bradycardia  - Hold digoxin and continue coreg  - dig level WNL  - continue xarelto    #CKD stage 3  - unknown baseline Cr (Cr 1.8 5/2016)  - monitor renal panel    #CAD s/p CABG and stents  - no active chest pain  - continue ASA, BB, statin  #CHF - likely systolic - compensated  - continue BB, metolazone, aldactone  - check echo    #DM2  - HbA1c 7.9  - hold metformin  - ISS  - diabetic diet    #HTN  - continue home meds    #HLD  - continue statin    #DVT prophylaxis  - on xarelto    *Discharge Instructions/Follow Up/Pending Labs:  -Transfer [facility] to [facility] under [facility] sue Landrum; Discussed with Dr landrum and [facility], awaiting for bed, only till monday;  transfer consent done.     Total time: 45 minutes
#Syncope 2ndry to bradycardia and Pneumonia   - continue tele -  AFIB, evidence of AIVR, multiple < 3 sec pause   - EKG with multiple PVCs, ST-T wave abnormalities  - CT head/C-spine - Neg  - continue ASA, BB, statin  - troponin X 3 negative  - cardio consult appreciated   - EP consult appreciated     #RLL PNA - community acquired with superimposed Viral infection  - +RVP with rhinovirus  - CT chest Large consolidation in the right lower lobe.  - will treat for gram negative bacteria  - continue ceftriaxone#4  S/P azithromycin  - f/u cultures     #Systolic CHF - compensated  -echo - Moderate global LV hypocontactility; Estimated left ventricular ejection fraction is 40 %.  -Started on ACEI  -Monitor I&o,   -continue BB, metolazone, aldactoneDaily weights    #A. fib  - currently bradycardia  - Hold digoxin and continue coreg  - dig level WNL  - continue xarelto    #CKD stage 3  - unknown baseline Cr (Cr 1.8 5/2016)  - monitor renal panel    #CAD s/p CABG and stents  - no active chest pain  - continue ASA, BB, statin  #DM2  - HbA1c 7.9  - hold metformin  - ISS  - diabetic diet    #HTN  - continue home meds    #HLD  - continue statin    #DVT prophylaxis  - on xarelto    *Discharge Instructions/Follow Up/Pending Labs:  -[facility]; Discussed with Dr landrum and [facility], awaiting for bed, only till monday;  transfer consent done.     Total time: 45 minutes
#Syncope 2ndry to bradycardia with frequent pauses and Pneumonia   - continue tele -  AFIB, evidence of AIVR, multiple < 3 sec pause   - EKG with multiple PVCs, ST-T wave abnormalities  - CT head/C-spine - Neg  - continue ASA, BB, statin  - troponin X 3 negative  - cardio consult appreciated - If patient doesn't go today to [place], will need cardiac cath and afterwards will need EPS for possible PPM/ICD.  - EP consult appreciated     #RLL PNA - community acquired with superimposed Viral infection  - +RVP with rhinovirus  - CT chest Large consolidation in the right lower lobe.  - will treat for gram negative bacteria  - continue ceftriaxone#6  S/P azithromycin  - f/u cultures     #Systolic CHF - compensated  -echo - Moderate global LV hypocontactility; Estimated left ventricular ejection fraction is 40 %.  -Started on ACEI  -Monitor I&o,   -continue BB, metolazone, aldactone   -I&O, Daily weights    #A. fib  - currently bradycardia  - Hold digoxin and continue coreg  - dig level WNL  - continue xarelto    #CKD stage 3  - unknown baseline Cr (Cr 1.8 5/2016)  - monitor renal panel    #CAD s/p CABG and stents  - no active chest pain  - continue ASA, BB, statin  #DM2  - HbA1c 7.9  - hold metformin  - ISS  - diabetic diet    #HTN  - continue home meds    #HLD  - continue statin    #DVT prophylaxis  - on xarelto    *Discharge Instructions/Follow Up/Pending Labs:  -Transfer patient to [facility] under Dr sue Landrum; Discussed with Dr landrum and transfer center, awaiting for bed.  transfer consent done.  If patient doesn't go today to [place], will need cardiac cath and afterwards will need EPS for possible PPM/ICD.    Total time: 45 minutes
PROBLEMS;    RLL PNA - community acquired  COPD/EMPHYSEMA  Syncope due to bradycardia-AFIB-multiple < 3 sec pause EKG with multiple PVCs  Systolic CHF  CKD stage 3  CAD s/p CABG and stents  DM2-HbA1c 7.9   HTN   HLD    PLAN;    encourage po fluids  watch for urine output  IV AZITHRO/RHOCEPHIN  CARIO FU  SUPPORTIVE CARE  DVT PROPHYLASIX
PROBLEMS;    RLL PNA - community acquired  COPD/EMPHYSEMA  Syncope due to bradycardia-AFIB-multiple < 3 sec pause EKG with multiple PVCs  Systolic CHF  CKD stage 3  CAD s/p CABG and stents  DM2-HbA1c 7.9   HTN   HLD    PLAN;    pulmonary stable  IV RHOCEPHIN  CARIO FU  SUPPORTIVE CARE  DVT PROPHYLASIX
PROBLEMS;    RLL PNA - community acquired  COPD/EMPHYSEMA  Syncope due to bradycardia-AFIB-multiple < 3 sec pause EKG with multiple PVCs  Systolic CHF  CKD stage 3  CAD s/p CABG and stents  DM2-HbA1c 7.9   HTN   HLD    PLAN;    pulmonary stable  change to po ceftin  CARIO FU  SUPPORTIVE CARE  DVT PROPHYLASIX
patient with CAD, CHF and afib with syncope, minimal enzyme elevation; requires further w/u.  Infiltrate on CT found on admission-getting abx(but unsure if pneumonia or malignancy)  1-syncope-patient with risk for bradyarrhythmia and VT.  Needs ischemic and EP w/u.  Patient is stable for transport to [place] where he wants all his cardiac care to take place.  He would need to go is ambulance with ACLS support if needed  should get nuclear stress test or cath for ischemic evaluation, followed by EPS-for PPM or ICD.  needs his medications  If willing to undergo studies here, set up for nuclear stress test and EPS-ifpatient decides to leave, should sign out AMA  Please schedule cardiac cath and reconsult EPS if patient willing to undergo treatment here
patient with CAD/CHF and afib with evidence of conduction disease, abnl complexes, widened QRS with EF 40% with syncope and minimal troponin elevation  1-patient to be transferred to [place]-if it doesn't happen today, will set up cardiac cath for tomorrow followed by EPS evaluation.  D/w patient  patient still currently refusing intervention here but is more open to it if transfer cant be worked out  continue medications
patient with syncope with signficant cardiac risk factors  1-CHF- reduced EF -  patient needs coreg , added enalapril  needs ischemic evaluation but defers until he is transferred   2-EPS-needs EP study; possible PPM or ICD
patient with syncope with signficant cardiac risk factors  1-CHF- reduced EF -  patient needs coreg , added enalapril  needs ischemic evaluation but defers until he is transferred   2-EPS-needs EP study; possible PPM or ICD vs change in medications.
#Syncope 2ndry to bradycardia and Pneumonia   - continue tele -  -evidence of AIVR, multiple < 3 sec pause   - EKG with multiple PVCs, ST-T wave abnormalities  - CT head/C-spine - Neg  - continue ASA, BB, statin  - serial cardiac enzymes  - echo - Moderate global LV hypocontactility; Estimated left ventricular ejection fraction is 40 %.  - cardio consult appreciated   - EP consult appreciated   - check orthostatics    #RLL PNA - community acquired with superimposed Viral infection  - +RVP with rhinovirus  - CT chest Large consolidation in the right lower lobe.  - will treat for gram negative bacteria  - continue ceftriaxone and azithromycin  - f/u cultures     #A. fib  - currently bradycardia  - Hold digoxin and continue coreg  - dig level WNL  - continue xarelto    #CKD stage 3  - unknown baseline Cr (Cr 1.8 5/2016)  - monitor renal panel    #CAD s/p CABG and stents  - no active chest pain  - continue ASA, BB, statin  #CHF - likely systolic - compensated  - continue BB, metolazone, aldactone  - check echo    #DM2  - HbA1c 7.9  - hold metformin  - ISS  - diabetic diet    #HTN  - continue home meds    #HLD  - continue statin    #DVT prophylaxis  - on xarelto    *Discharge Instructions/Follow Up/Pending Labs:  -[facility]; Discussed with Dr landrum and [facility], awaiting for bed;  transfer consent done.     Total time: 45 minutes

## 2017-10-24 NOTE — PROGRESS NOTE ADULT - SUBJECTIVE AND OBJECTIVE BOX
Subjective:    pat again lightheadedness during urination, pat waiting to transfer to [place] for [facility], no respiratory complaint.    Home Medications:  acetaminophen 325 mg oral tablet: 2 tab(s) orally every 6 hours, As needed, Mild Pain (1 - 3) (19 Oct 2017 13:27)  aspirin 81 mg oral delayed release tablet: 1 tab(s) orally once a day (18 Oct 2017 12:43)  atorvastatin 20 mg oral tablet: 1 tab(s) orally once a day (18 Oct 2017 12:43)  carvedilol 6.25 mg oral tablet: 1 tab(s) orally 2 times a day (18 Oct 2017 12:43)  cefTRIAXone: 1 gram(s) intravenous once a day (19 Oct 2017 13:27)  enalapril 2.5 mg oral tablet: 1 tab(s) orally every 12 hours (22 Oct 2017 13:02)  guaiFENesin 100 mg/5 mL oral liquid: 10 milliliter(s) orally every 6 hours, As needed, Cough (22 Oct 2017 13:02)  insulin lispro 100 units/mL subcutaneous solution:  subcutaneous 3 times a day (before meals); 1 Unit(s) if Glucose 151 - 200  2 Unit(s) if Glucose 201 - 250  3 Unit(s) if Glucose 251 - 300  4 Unit(s) if Glucose 301 - 350  5 Unit(s) if Glucose 351 - 400  6 Unit(s) if Glucose Greater Than 400 (19 Oct 2017 13:27)  insulin lispro 100 units/mL subcutaneous solution:  subcutaneous once a day (at bedtime); 0 Unit(s) if Glucose 0 - 250  1 Unit(s) if Glucose 251 - 300  2 Unit(s) if Glucose 301 - 350  3 Unit(s) if Glucose 351 - 400  4 Unit(s) if Glucose Greater Than 400 (19 Oct 2017 13:27)  metOLazone 2.5 mg oral tablet: 1 tab(s) orally once a day (18 Oct 2017 12:43)  rivaroxaban 10 mg oral tablet: 1 tab(s) orally once a day (18 Oct 2017 12:43)  spironolactone 25 mg oral tablet: 1 tab(s) orally once a day (18 Oct 2017 12:43)  tamsulosin 0.4 mg oral capsule: 1 cap(s) orally once a day (at bedtime) (19 Oct 2017 13:27)    MEDICATIONS  (STANDING):  aspirin enteric coated 81 milliGRAM(s) Oral daily  atorvastatin 20 milliGRAM(s) Oral at bedtime  azithromycin   Tablet 500 milliGRAM(s) Oral daily  carvedilol 6.25 milliGRAM(s) Oral every 12 hours  cefTRIAXone   IVPB 1 Gram(s) IV Intermittent every 24 hours  dextrose 5%. 1000 milliLiter(s) (50 mL/Hr) IV Continuous <Continuous>  dextrose 50% Injectable 12.5 Gram(s) IV Push once  dextrose 50% Injectable 25 Gram(s) IV Push once  dextrose 50% Injectable 25 Gram(s) IV Push once  enalapril 2.5 milliGRAM(s) Oral every 12 hours  insulin lispro (HumaLOG) corrective regimen sliding scale   SubCutaneous three times a day before meals  insulin lispro (HumaLOG) corrective regimen sliding scale   SubCutaneous at bedtime  metolazone 2.5 milliGRAM(s) Oral daily  rivaroxaban 15 milliGRAM(s) Oral every 24 hours  spironolactone 25 milliGRAM(s) Oral daily  tamsulosin 0.4 milliGRAM(s) Oral at bedtime    MEDICATIONS  (PRN):  acetaminophen   Tablet. 650 milliGRAM(s) Oral every 6 hours PRN Mild Pain (1 - 3)  dextrose Gel 1 Dose(s) Oral once PRN Blood Glucose LESS THAN 70 milliGRAM(s)/deciliter  glucagon  Injectable 1 milliGRAM(s) IntraMuscular once PRN Glucose LESS THAN 70 milligrams/deciliter  guaiFENesin    Syrup 200 milliGRAM(s) Oral every 6 hours PRN Cough  melatonin 3 milliGRAM(s) Oral at bedtime PRN Insomnia      Allergies    morphine (Hypotension)  oxycodone (Other)    Intolerances        Vital Signs Last 24 Hrs  T(C): 36.4 (24 Oct 2017 05:09), Max: 36.9 (23 Oct 2017 17:06)  T(F): 97.6 (24 Oct 2017 05:09), Max: 98.4 (23 Oct 2017 17:06)  HR: 65 (24 Oct 2017 08:06) (50 - 65)  BP: 114/62 (24 Oct 2017 08:06) (105/47 - 139/61)  BP(mean): --  RR: 19 (24 Oct 2017 05:09) (18 - 20)  SpO2: 98% (24 Oct 2017 05:09) (98% - 99%)      PHYSICAL EXAMINATION:    NECK:  Supple. No lymphadenopathy. Jugular venous pressure not elevated. Carotids equal.   HEART:   The cardiac impulse has a normal quality. Reg., Nl S1 and S2.  There are no murmurs, rubs or gallops noted  CHEST:  Chest is clear to auscultation. Normal respiratory effort.  ABDOMEN:  Soft and nontender.   EXTREMITIES:  There is no edema.       LABS:                        13.0   x     )-----------( 236      ( 24 Oct 2017 04:43 )             37.2     10-24    x   |  x   |  x   ----------------------------<  x   4.4   |  x   |  x     Phos  2.8     10-24  Mg     1.5     10-24

## 2017-10-24 NOTE — CHART NOTE - NSCHARTNOTEFT_GEN_A_CORE
transfer sign out to [facility] to resident Shane Kim (Internal Medicine)   pt is expected to leave tonight. no complaints.

## 2017-10-30 DIAGNOSIS — I48.91 UNSPECIFIED ATRIAL FIBRILLATION: ICD-10-CM

## 2017-10-30 DIAGNOSIS — N40.0 BENIGN PROSTATIC HYPERPLASIA WITHOUT LOWER URINARY TRACT SYMPTOMS: ICD-10-CM

## 2017-10-30 DIAGNOSIS — R55 SYNCOPE AND COLLAPSE: ICD-10-CM

## 2017-10-30 DIAGNOSIS — J44.9 CHRONIC OBSTRUCTIVE PULMONARY DISEASE, UNSPECIFIED: ICD-10-CM

## 2017-10-30 DIAGNOSIS — Z85.118 PERSONAL HISTORY OF OTHER MALIGNANT NEOPLASM OF BRONCHUS AND LUNG: ICD-10-CM

## 2017-10-30 DIAGNOSIS — N18.3 CHRONIC KIDNEY DISEASE, STAGE 3 (MODERATE): ICD-10-CM

## 2017-10-30 DIAGNOSIS — Z79.899 OTHER LONG TERM (CURRENT) DRUG THERAPY: ICD-10-CM

## 2017-10-30 DIAGNOSIS — Z79.4 LONG TERM (CURRENT) USE OF INSULIN: ICD-10-CM

## 2017-10-30 DIAGNOSIS — E11.9 TYPE 2 DIABETES MELLITUS WITHOUT COMPLICATIONS: ICD-10-CM

## 2017-10-30 DIAGNOSIS — Z85.46 PERSONAL HISTORY OF MALIGNANT NEOPLASM OF PROSTATE: ICD-10-CM

## 2017-10-30 DIAGNOSIS — Z79.82 LONG TERM (CURRENT) USE OF ASPIRIN: ICD-10-CM

## 2017-10-30 DIAGNOSIS — J12.9 VIRAL PNEUMONIA, UNSPECIFIED: ICD-10-CM

## 2017-10-30 DIAGNOSIS — I50.22 CHRONIC SYSTOLIC (CONGESTIVE) HEART FAILURE: ICD-10-CM

## 2017-10-30 DIAGNOSIS — I13.0 HYPERTENSIVE HEART AND CHRONIC KIDNEY DISEASE WITH HEART FAILURE AND STAGE 1 THROUGH STAGE 4 CHRONIC KIDNEY DISEASE, OR UNSPECIFIED CHRONIC KIDNEY DISEASE: ICD-10-CM

## 2017-10-30 DIAGNOSIS — Z88.5 ALLERGY STATUS TO NARCOTIC AGENT: ICD-10-CM

## 2017-10-30 DIAGNOSIS — Z95.5 PRESENCE OF CORONARY ANGIOPLASTY IMPLANT AND GRAFT: ICD-10-CM

## 2017-10-30 DIAGNOSIS — Z79.01 LONG TERM (CURRENT) USE OF ANTICOAGULANTS: ICD-10-CM

## 2017-10-30 DIAGNOSIS — E78.5 HYPERLIPIDEMIA, UNSPECIFIED: ICD-10-CM

## 2017-10-30 DIAGNOSIS — R00.1 BRADYCARDIA, UNSPECIFIED: ICD-10-CM

## 2017-10-30 DIAGNOSIS — I25.10 ATHEROSCLEROTIC HEART DISEASE OF NATIVE CORONARY ARTERY WITHOUT ANGINA PECTORIS: ICD-10-CM

## 2017-10-30 DIAGNOSIS — Z87.891 PERSONAL HISTORY OF NICOTINE DEPENDENCE: ICD-10-CM

## 2017-10-30 DIAGNOSIS — H26.9 UNSPECIFIED CATARACT: ICD-10-CM

## 2017-10-31 DIAGNOSIS — I49.5 SICK SINUS SYNDROME: ICD-10-CM

## 2018-06-04 ENCOUNTER — TRANSCRIPTION ENCOUNTER (OUTPATIENT)
Age: 78
End: 2018-06-04

## 2018-08-06 ENCOUNTER — INPATIENT (INPATIENT)
Facility: HOSPITAL | Age: 78
LOS: 1 days | Discharge: HOME | End: 2018-08-08
Attending: INTERNAL MEDICINE | Admitting: INTERNAL MEDICINE
Payer: MEDICARE

## 2018-08-06 VITALS — WEIGHT: 145.06 LBS | HEIGHT: 70 IN

## 2018-08-06 DIAGNOSIS — Z98.89 OTHER SPECIFIED POSTPROCEDURAL STATES: Chronic | ICD-10-CM

## 2018-08-06 DIAGNOSIS — Z95.5 PRESENCE OF CORONARY ANGIOPLASTY IMPLANT AND GRAFT: Chronic | ICD-10-CM

## 2018-08-06 DIAGNOSIS — Z95.1 PRESENCE OF AORTOCORONARY BYPASS GRAFT: Chronic | ICD-10-CM

## 2018-08-06 LAB
ALBUMIN SERPL ELPH-MCNC: 3.9 G/DL — SIGNIFICANT CHANGE UP (ref 3.3–5)
ALP SERPL-CCNC: 75 U/L — SIGNIFICANT CHANGE UP (ref 40–120)
ALT FLD-CCNC: 17 U/L — SIGNIFICANT CHANGE UP (ref 12–78)
ANION GAP SERPL CALC-SCNC: 7 MMOL/L — SIGNIFICANT CHANGE UP (ref 5–17)
APTT BLD: 40.4 SEC — HIGH (ref 27.5–37.4)
AST SERPL-CCNC: 15 U/L — SIGNIFICANT CHANGE UP (ref 15–37)
BASOPHILS # BLD AUTO: 0.03 K/UL — SIGNIFICANT CHANGE UP (ref 0–0.2)
BASOPHILS NFR BLD AUTO: 0.4 % — SIGNIFICANT CHANGE UP (ref 0–2)
BILIRUB SERPL-MCNC: 1.4 MG/DL — HIGH (ref 0.2–1.2)
BUN SERPL-MCNC: 21 MG/DL — SIGNIFICANT CHANGE UP (ref 7–23)
CALCIUM SERPL-MCNC: 10.5 MG/DL — HIGH (ref 8.5–10.1)
CHLORIDE SERPL-SCNC: 99 MMOL/L — SIGNIFICANT CHANGE UP (ref 96–108)
CO2 SERPL-SCNC: 29 MMOL/L — SIGNIFICANT CHANGE UP (ref 22–31)
CREAT SERPL-MCNC: 1.36 MG/DL — HIGH (ref 0.5–1.3)
EOSINOPHIL # BLD AUTO: 0.16 K/UL — SIGNIFICANT CHANGE UP (ref 0–0.5)
EOSINOPHIL NFR BLD AUTO: 1.9 % — SIGNIFICANT CHANGE UP (ref 0–6)
GLUCOSE SERPL-MCNC: 172 MG/DL — HIGH (ref 70–99)
HCT VFR BLD CALC: 44.6 % — SIGNIFICANT CHANGE UP (ref 39–50)
HGB BLD-MCNC: 15.6 G/DL — SIGNIFICANT CHANGE UP (ref 13–17)
IMM GRANULOCYTES NFR BLD AUTO: 0.6 % — SIGNIFICANT CHANGE UP (ref 0–1.5)
INR BLD: 1.81 RATIO — HIGH (ref 0.88–1.16)
LYMPHOCYTES # BLD AUTO: 1.79 K/UL — SIGNIFICANT CHANGE UP (ref 1–3.3)
LYMPHOCYTES # BLD AUTO: 21.3 % — SIGNIFICANT CHANGE UP (ref 13–44)
MCHC RBC-ENTMCNC: 32 PG — SIGNIFICANT CHANGE UP (ref 27–34)
MCHC RBC-ENTMCNC: 35 GM/DL — SIGNIFICANT CHANGE UP (ref 32–36)
MCV RBC AUTO: 91.4 FL — SIGNIFICANT CHANGE UP (ref 80–100)
MONOCYTES # BLD AUTO: 0.94 K/UL — HIGH (ref 0–0.9)
MONOCYTES NFR BLD AUTO: 11.2 % — SIGNIFICANT CHANGE UP (ref 2–14)
NEUTROPHILS # BLD AUTO: 5.44 K/UL — SIGNIFICANT CHANGE UP (ref 1.8–7.4)
NEUTROPHILS NFR BLD AUTO: 64.6 % — SIGNIFICANT CHANGE UP (ref 43–77)
NRBC # BLD: 0 /100 WBCS — SIGNIFICANT CHANGE UP (ref 0–0)
PLATELET # BLD AUTO: 210 K/UL — SIGNIFICANT CHANGE UP (ref 150–400)
POTASSIUM SERPL-MCNC: 5.1 MMOL/L — SIGNIFICANT CHANGE UP (ref 3.5–5.3)
POTASSIUM SERPL-SCNC: 5.1 MMOL/L — SIGNIFICANT CHANGE UP (ref 3.5–5.3)
PROT SERPL-MCNC: 7.6 GM/DL — SIGNIFICANT CHANGE UP (ref 6–8.3)
PROTHROM AB SERPL-ACNC: 19.8 SEC — HIGH (ref 9.8–12.7)
RBC # BLD: 4.88 M/UL — SIGNIFICANT CHANGE UP (ref 4.2–5.8)
RBC # FLD: 12.5 % — SIGNIFICANT CHANGE UP (ref 10.3–14.5)
SODIUM SERPL-SCNC: 135 MMOL/L — SIGNIFICANT CHANGE UP (ref 135–145)
TROPONIN I SERPL-MCNC: 0.02 NG/ML — SIGNIFICANT CHANGE UP (ref 0.01–0.04)
TROPONIN I SERPL-MCNC: <0.015 NG/ML — SIGNIFICANT CHANGE UP (ref 0.01–0.04)
WBC # BLD: 8.41 K/UL — SIGNIFICANT CHANGE UP (ref 3.8–10.5)
WBC # FLD AUTO: 8.41 K/UL — SIGNIFICANT CHANGE UP (ref 3.8–10.5)

## 2018-08-06 PROCEDURE — 71046 X-RAY EXAM CHEST 2 VIEWS: CPT | Mod: 26

## 2018-08-06 PROCEDURE — 93010 ELECTROCARDIOGRAM REPORT: CPT

## 2018-08-06 PROCEDURE — 99285 EMERGENCY DEPT VISIT HI MDM: CPT

## 2018-08-06 RX ORDER — CARVEDILOL PHOSPHATE 80 MG/1
1 CAPSULE, EXTENDED RELEASE ORAL
Qty: 0 | Refills: 0 | COMMUNITY

## 2018-08-06 RX ORDER — ASPIRIN/CALCIUM CARB/MAGNESIUM 324 MG
325 TABLET ORAL ONCE
Qty: 0 | Refills: 0 | Status: COMPLETED | OUTPATIENT
Start: 2018-08-06 | End: 2018-08-06

## 2018-08-06 RX ORDER — SODIUM CHLORIDE 9 MG/ML
3 INJECTION INTRAMUSCULAR; INTRAVENOUS; SUBCUTANEOUS ONCE
Qty: 0 | Refills: 0 | Status: COMPLETED | OUTPATIENT
Start: 2018-08-06 | End: 2018-08-06

## 2018-08-06 RX ORDER — ASPIRIN/CALCIUM CARB/MAGNESIUM 324 MG
1 TABLET ORAL
Qty: 0 | Refills: 0 | COMMUNITY

## 2018-08-06 RX ADMIN — SODIUM CHLORIDE 3 MILLILITER(S): 9 INJECTION INTRAMUSCULAR; INTRAVENOUS; SUBCUTANEOUS at 20:26

## 2018-08-06 RX ADMIN — Medication 325 MILLIGRAM(S): at 20:26

## 2018-08-06 NOTE — ED PROVIDER NOTE - NS_ ATTENDINGSCRIBEDETAILS _ED_A_ED_FT
The scribe's documentation has been prepared under my direction and personally reviewed by me in its entirety.  I confirm that the note above accurately reflects all my work, treatment, procedures, and decision making except where otherwise noted or amended by me.  Shade Atwood M.D.

## 2018-08-06 NOTE — ED PROVIDER NOTE - PROGRESS NOTE DETAILS
LAURA no emergent findings during ED evaluation.  PT admitted for risk stratification and secondary prevention.  Given presentation and findings, patient requires hospitalization likely requiring 5 days of inpatient care.  Patient signed out to Dr. Rashid.  Patient's history, vital signs, lab results, imaging, pertinent findings, and clinical condition reviewed during sign out.  At sign out patient admitted in hemodynamically stable condition in no acute distress.

## 2018-08-06 NOTE — ED ADULT NURSE NOTE - OBJECTIVE STATEMENT
pt. c/o of CP and SOB "for a few days". pt. states hx of afib and states " I feel like I'm in Afib, its hard to take a deep breath." pt. denies recent long travel, fever, chills, abd. pain, numbness, tingling.

## 2018-08-06 NOTE — ED PROVIDER NOTE - OBJECTIVE STATEMENT
79 y/o male with a PMHx of AFib, s/p CABG x4, CAD s/p stents x3, CHF, BPH, DM, HLD, HTN, COPD, s/p partial lobectomy of right lung (in remission), prostate CA presents to the ED c/o SOB x3 days, tightening x2 days ago. Pt reports his sugar was high (290). Denies fever, HA, nausea. Pt takes Xarelto. Pt sent from PMD office today. No recent weight gain. Former smoker (Quit 20 years ago, smoked 3-4 ppd). Pulm: Dr. Hernandez. Cardio: Dr. Lam.

## 2018-08-06 NOTE — ED ADULT NURSE NOTE - NSIMPLEMENTINTERV_GEN_ALL_ED
Implemented All Fall with Harm Risk Interventions:  [place] to call system. Call bell, personal items and telephone within reach. Instruct patient to call for assistance. Room bathroom lighting operational. Non-slip footwear when patient is off stretcher. Physically safe environment: no spills, clutter or unnecessary equipment. Stretcher in lowest position, wheels locked, appropriate side rails in place. Provide visual cue, wrist band, yellow gown, etc. Monitor gait and stability. Monitor for mental status changes and reorient to person, place, and time. Review medications for side effects contributing to fall risk. Reinforce activity limits and safety measures with patient and family. Provide visual clues: red socks.

## 2018-08-06 NOTE — ED ADULT TRIAGE NOTE - CHIEF COMPLAINT QUOTE
pt sent from pmd office to evaluate lethargy x 3 days , decrease appetite , chf exacerbation, weakness

## 2018-08-06 NOTE — ED STATDOCS - NS_ ATTENDINGSCRIBEDETAILS _ED_A_ED_FT
I Raghu Painter MD saw and examined the patient. Scribe documented for me and under my supervision. I have modified the scribe's documentation where necessary to reflect my history, physical exam and other relevant documentations pertinent to the care of the patient.

## 2018-08-06 NOTE — ED STATDOCS - PROGRESS NOTE DETAILS
Guillermina SANCHEZ for ED attending, Dr. Painter: 77 y/o male with a PMHx of afib, s/p CABG x4, CAD s/p stents x3, CHF, BPH, DM, HLD, HTN, COPD, s/p partial lobectomy of right lung (in remission), prostate CA presents to the ED c/o SOB x3 days. Pt sent from PMD office today. No recent weight gain. Former smoker (Quit 20 years ago, smoked 3-4 ppd). Pulm: Dr. Hernandez. Cardio: Dr. Lam. ACS vs. CHF exacerbation. Needs cardiac monitoring, likely admission. Will send pt to main ED for further evaluation.

## 2018-08-07 DIAGNOSIS — Z98.1 ARTHRODESIS STATUS: Chronic | ICD-10-CM

## 2018-08-07 DIAGNOSIS — Z98.890 OTHER SPECIFIED POSTPROCEDURAL STATES: Chronic | ICD-10-CM

## 2018-08-07 LAB
ANION GAP SERPL CALC-SCNC: 9 MMOL/L — SIGNIFICANT CHANGE UP (ref 5–17)
BASOPHILS # BLD AUTO: 0.03 K/UL — SIGNIFICANT CHANGE UP (ref 0–0.2)
BASOPHILS NFR BLD AUTO: 0.4 % — SIGNIFICANT CHANGE UP (ref 0–2)
BUN SERPL-MCNC: 25 MG/DL — HIGH (ref 7–23)
CALCIUM SERPL-MCNC: 9.8 MG/DL — SIGNIFICANT CHANGE UP (ref 8.5–10.1)
CHLORIDE SERPL-SCNC: 99 MMOL/L — SIGNIFICANT CHANGE UP (ref 96–108)
CO2 SERPL-SCNC: 28 MMOL/L — SIGNIFICANT CHANGE UP (ref 22–31)
CREAT SERPL-MCNC: 1.39 MG/DL — HIGH (ref 0.5–1.3)
EOSINOPHIL # BLD AUTO: 0.13 K/UL — SIGNIFICANT CHANGE UP (ref 0–0.5)
EOSINOPHIL NFR BLD AUTO: 1.6 % — SIGNIFICANT CHANGE UP (ref 0–6)
GLUCOSE BLDC GLUCOMTR-MCNC: 136 MG/DL — HIGH (ref 70–99)
GLUCOSE BLDC GLUCOMTR-MCNC: 148 MG/DL — HIGH (ref 70–99)
GLUCOSE BLDC GLUCOMTR-MCNC: 160 MG/DL — HIGH (ref 70–99)
GLUCOSE BLDC GLUCOMTR-MCNC: 259 MG/DL — HIGH (ref 70–99)
GLUCOSE SERPL-MCNC: 272 MG/DL — HIGH (ref 70–99)
HCT VFR BLD CALC: 42.9 % — SIGNIFICANT CHANGE UP (ref 39–50)
HGB BLD-MCNC: 14.8 G/DL — SIGNIFICANT CHANGE UP (ref 13–17)
IMM GRANULOCYTES NFR BLD AUTO: 0.8 % — SIGNIFICANT CHANGE UP (ref 0–1.5)
LYMPHOCYTES # BLD AUTO: 1.67 K/UL — SIGNIFICANT CHANGE UP (ref 1–3.3)
LYMPHOCYTES # BLD AUTO: 20.2 % — SIGNIFICANT CHANGE UP (ref 13–44)
MCHC RBC-ENTMCNC: 32.1 PG — SIGNIFICANT CHANGE UP (ref 27–34)
MCHC RBC-ENTMCNC: 34.5 GM/DL — SIGNIFICANT CHANGE UP (ref 32–36)
MCV RBC AUTO: 93.1 FL — SIGNIFICANT CHANGE UP (ref 80–100)
MONOCYTES # BLD AUTO: 0.74 K/UL — SIGNIFICANT CHANGE UP (ref 0–0.9)
MONOCYTES NFR BLD AUTO: 8.9 % — SIGNIFICANT CHANGE UP (ref 2–14)
NEUTROPHILS # BLD AUTO: 5.63 K/UL — SIGNIFICANT CHANGE UP (ref 1.8–7.4)
NEUTROPHILS NFR BLD AUTO: 68.1 % — SIGNIFICANT CHANGE UP (ref 43–77)
NRBC # BLD: 0 /100 WBCS — SIGNIFICANT CHANGE UP (ref 0–0)
PLATELET # BLD AUTO: 211 K/UL — SIGNIFICANT CHANGE UP (ref 150–400)
POTASSIUM SERPL-MCNC: 4.1 MMOL/L — SIGNIFICANT CHANGE UP (ref 3.5–5.3)
POTASSIUM SERPL-SCNC: 4.1 MMOL/L — SIGNIFICANT CHANGE UP (ref 3.5–5.3)
RBC # BLD: 4.61 M/UL — SIGNIFICANT CHANGE UP (ref 4.2–5.8)
RBC # FLD: 12.5 % — SIGNIFICANT CHANGE UP (ref 10.3–14.5)
SODIUM SERPL-SCNC: 136 MMOL/L — SIGNIFICANT CHANGE UP (ref 135–145)
TROPONIN I SERPL-MCNC: 0.02 NG/ML — SIGNIFICANT CHANGE UP (ref 0.01–0.04)
WBC # BLD: 8.27 K/UL — SIGNIFICANT CHANGE UP (ref 3.8–10.5)
WBC # FLD AUTO: 8.27 K/UL — SIGNIFICANT CHANGE UP (ref 3.8–10.5)

## 2018-08-07 PROCEDURE — 93970 EXTREMITY STUDY: CPT | Mod: 26

## 2018-08-07 PROCEDURE — 93306 TTE W/DOPPLER COMPLETE: CPT | Mod: 26

## 2018-08-07 RX ORDER — DEXTROSE 50 % IN WATER 50 %
12.5 SYRINGE (ML) INTRAVENOUS ONCE
Qty: 0 | Refills: 0 | Status: DISCONTINUED | OUTPATIENT
Start: 2018-08-07 | End: 2018-08-08

## 2018-08-07 RX ORDER — DIGOXIN 250 MCG
0.06 TABLET ORAL DAILY
Qty: 0 | Refills: 0 | Status: DISCONTINUED | OUTPATIENT
Start: 2018-08-07 | End: 2018-08-08

## 2018-08-07 RX ORDER — DEXTROSE 50 % IN WATER 50 %
25 SYRINGE (ML) INTRAVENOUS ONCE
Qty: 0 | Refills: 0 | Status: DISCONTINUED | OUTPATIENT
Start: 2018-08-07 | End: 2018-08-08

## 2018-08-07 RX ORDER — INSULIN LISPRO 100/ML
VIAL (ML) SUBCUTANEOUS AT BEDTIME
Qty: 0 | Refills: 0 | Status: DISCONTINUED | OUTPATIENT
Start: 2018-08-07 | End: 2018-08-08

## 2018-08-07 RX ORDER — INSULIN LISPRO 100/ML
VIAL (ML) SUBCUTANEOUS
Qty: 0 | Refills: 0 | Status: DISCONTINUED | OUTPATIENT
Start: 2018-08-07 | End: 2018-08-08

## 2018-08-07 RX ORDER — GLUCAGON INJECTION, SOLUTION 0.5 MG/.1ML
1 INJECTION, SOLUTION SUBCUTANEOUS ONCE
Qty: 0 | Refills: 0 | Status: DISCONTINUED | OUTPATIENT
Start: 2018-08-07 | End: 2018-08-08

## 2018-08-07 RX ORDER — SODIUM CHLORIDE 9 MG/ML
1000 INJECTION, SOLUTION INTRAVENOUS
Qty: 0 | Refills: 0 | Status: DISCONTINUED | OUTPATIENT
Start: 2018-08-07 | End: 2018-08-08

## 2018-08-07 RX ORDER — ATORVASTATIN CALCIUM 80 MG/1
20 TABLET, FILM COATED ORAL AT BEDTIME
Qty: 0 | Refills: 0 | Status: DISCONTINUED | OUTPATIENT
Start: 2018-08-07 | End: 2018-08-08

## 2018-08-07 RX ORDER — TAMSULOSIN HYDROCHLORIDE 0.4 MG/1
0.4 CAPSULE ORAL AT BEDTIME
Qty: 0 | Refills: 0 | Status: DISCONTINUED | OUTPATIENT
Start: 2018-08-07 | End: 2018-08-08

## 2018-08-07 RX ORDER — DEXTROSE 50 % IN WATER 50 %
15 SYRINGE (ML) INTRAVENOUS ONCE
Qty: 0 | Refills: 0 | Status: DISCONTINUED | OUTPATIENT
Start: 2018-08-07 | End: 2018-08-08

## 2018-08-07 RX ORDER — DIGOXIN 250 MCG
0.12 TABLET ORAL DAILY
Qty: 0 | Refills: 0 | Status: DISCONTINUED | OUTPATIENT
Start: 2018-08-07 | End: 2018-08-07

## 2018-08-07 RX ORDER — ASPIRIN/CALCIUM CARB/MAGNESIUM 324 MG
81 TABLET ORAL DAILY
Qty: 0 | Refills: 0 | Status: DISCONTINUED | OUTPATIENT
Start: 2018-08-07 | End: 2018-08-08

## 2018-08-07 RX ORDER — RIVAROXABAN 15 MG-20MG
15 KIT ORAL EVERY 24 HOURS
Qty: 0 | Refills: 0 | Status: DISCONTINUED | OUTPATIENT
Start: 2018-08-07 | End: 2018-08-08

## 2018-08-07 RX ADMIN — ATORVASTATIN CALCIUM 20 MILLIGRAM(S): 80 TABLET, FILM COATED ORAL at 21:44

## 2018-08-07 RX ADMIN — Medication 81 MILLIGRAM(S): at 13:12

## 2018-08-07 RX ADMIN — TAMSULOSIN HYDROCHLORIDE 0.4 MILLIGRAM(S): 0.4 CAPSULE ORAL at 21:44

## 2018-08-07 RX ADMIN — RIVAROXABAN 15 MILLIGRAM(S): KIT at 19:37

## 2018-08-07 RX ADMIN — Medication 6: at 12:20

## 2018-08-07 NOTE — PATIENT PROFILE ADULT. - PMH
BPH (benign prostatic hyperplasia)  "chemo shrink prostate"  CAD (coronary artery disease)  S/P coronary artery stents 2002, 2003  Carotid stenosis, left    Cataracts, bilateral  lens implants  DM (diabetes mellitus)    HLD (hyperlipidemia)    HTN (hypertension)    Lung cancer    Osteoarthritis    Prostate ca

## 2018-08-07 NOTE — H&P ADULT - HISTORY OF PRESENT ILLNESS
79 y/o male with a PMHx of AFib, s/p CABG x4, CAD s/p stents x3, CHF, BPH, DM, HLD, HTN, COPD, s/p partial lobectomy of right lung (in remission), prostate CA presents to the ED c/o SOB x3 days, tightening x2 days ago. Pt reports his sugar was high (290). Denies fever, HA, nausea. Pt takes Xarelto. Pt sent from PMD office today. No recent weight gain. Former smoker (Quit 20 years ago, smoked 3-4 ppd). Pulm: Dr. Hernandez. Cardio: Dr. Lam. 77 yo male with PMH of CAD s/p CABGx4 and stentsx3, CHF EF 40% 2017,  HTN, HLD, DM2, prostate CA, h/o lung malignancy s/p radiation, h/o RUL  partial lobectomy for recurrent PNA, A. fib on xarelto, COPD  presents to the ED c/o SOB x3 days, right sided sharp chest pain x2 days ago mostly constant, but improved on rest and worse on exertion . Pt reports his sugar was high (290). Denies fever, HA, nausea.cough, leg swelling, no calf pain .no recent hx of hospitalization or immobilization or air travel in past 6 months Pt takes Xarelto. Pt sent from PMD office yesterday. No recent weight gain.  In ED  chest pain and SOB resolved , troponins x2 neg, afeberile     CXR no PNA ,no CHF, no acute changes from prior CXR done 2017 per my interpretation  EKG Afib 77, with intermittent PVCs, nonspecific ST/T waves     Fam Hx- no pertinent family hx in 1st degree relatives

## 2018-08-07 NOTE — H&P ADULT - NSHPLABSRESULTS_GEN_ALL_CORE
15.6   8.41  )-----------( 210      ( 06 Aug 2018 19:36 )             44.6       CBC Full  -  ( 06 Aug 2018 19:36 )  WBC Count : 8.41 K/uL  Hemoglobin : 15.6 g/dL  Hematocrit : 44.6 %  Platelet Count - Automated : 210 K/uL  Mean Cell Volume : 91.4 fl  Mean Cell Hemoglobin : 32.0 pg  Mean Cell Hemoglobin Concentration : 35.0 gm/dL  Auto Neutrophil # : 5.44 K/uL  Auto Lymphocyte # : 1.79 K/uL  Auto Monocyte # : 0.94 K/uL  Auto Eosinophil # : 0.16 K/uL  Auto Basophil # : 0.03 K/uL  Auto Neutrophil % : 64.6 %  Auto Lymphocyte % : 21.3 %  Auto Monocyte % : 11.2 %  Auto Eosinophil % : 1.9 %  Auto Basophil % : 0.4 %      08-06    135  |  99  |  21  ----------------------------<  172<H>  5.1   |  29  |  1.36<H>    Ca    10.5<H>      06 Aug 2018 19:36    TPro  7.6  /  Alb  3.9  /  TBili  1.4<H>  /  DBili  x   /  AST  15  /  ALT  17  /  AlkPhos  75  08-06      LIVER FUNCTIONS - ( 06 Aug 2018 19:36 )  Alb: 3.9 g/dL / Pro: 7.6 gm/dL / ALK PHOS: 75 U/L / ALT: 17 U/L / AST: 15 U/L / GGT: x             PT/INR - ( 06 Aug 2018 19:36 )   PT: 19.8 sec;   INR: 1.81 ratio         PTT - ( 06 Aug 2018 19:36 )  PTT:40.4 sec    CARDIAC MARKERS ( 06 Aug 2018 20:24 )  <0.015 ng/mL / x     / x     / x     / x      CARDIAC MARKERS ( 06 Aug 2018 19:36 )  0.018 ng/mL / x     / x     / x     / x                    MEDICATIONS  (STANDING):

## 2018-08-07 NOTE — H&P ADULT - ASSESSMENT
79 yo male with PMH of CAD s/p CABGx4 and stentsx3, CHF EF 40% 2017,  HTN, HLD, DM2, prostate CA, h/o lung malignancy s/p radiation, h/o RUL  partial lobectomy for recurrent PNA, A. fib on xarelto, COPD  presents to the ED c/o SOB x3 days, right sided sharp chest pain x2 days ago mostly constant, but improved on rest and worse on exertion . Pt reports his sugar was high (290). Denies fever, HA, nausea.cough, leg swelling, no calf pain .no recent hx of hospitalization or immobilization or air travel in past 6 months Pt takes Xarelto. Pt sent from PMD office yesterday. No recent weight gain.  In ED  chest pain and SOB resolved , troponins x2 neg, afeberile     CXR no PNA ,no CHF, no acute changes from prior CXR done 2017 per my interpretation  EKG Afib 77, with intermittent PVCs, nonspecific ST/T waves    # Chest pain/SOB x2 days / now resolved -r/o ACS  -Admit to tele  -serial cardiac enzymes  -asa, statin BB  -clinically less likely PE as patient on xarelto , no tachycardia, no recent hospitalaisation or immobilization ,  -will do venous doppler  -Cardio consult  -pulm consult   -echo    #HENRIQUE on CKDstage 3  will hold diuretics for 1 day and monitor BMP  hold metformin    #hx CAD/stents/CABGx4  continue home meds    # hx chronic systolic CHF compensated  continue home meds, except hold diuretics for 1 day due to HENRIQUE    #hx afib on xarelto   continue xarelto  and digoxin    #hx DM type2   hold metformin   ISS    # DVT prophylaxis  -on xarelto 79 yo male with PMH of CAD s/p CABGx4 and stentsx3, CHF EF 40% 2017,  HTN, HLD, DM2, prostate CA, h/o lung malignancy s/p radiation, h/o RUL  partial lobectomy for recurrent PNA, A. fib on xarelto, COPD  presents to the ED c/o SOB x3 days, right sided sharp chest pain x2 days ago mostly constant, but improved on rest and worse on exertion . Pt reports his sugar was high (290). Denies fever, HA, nausea.cough, leg swelling, no calf pain .no recent hx of hospitalization or immobilization or air travel in past 6 months Pt takes Xarelto. Pt sent from PMD office yesterday. No recent weight gain.  In ED  chest pain and SOB resolved , troponins x2 neg, afeberile     CXR no PNA ,no CHF, no acute changes from prior CXR done 2017 per my interpretation  EKG Afib 77, with intermittent PVCs, nonspecific ST/T waves    # Chest pain/SOB x2 days / now resolved -r/o ACS  -Admit to tele  -serial cardiac enzymes  -asa, statin , not on BB at home   -clinically less likely PE as patient on xarelto , no tachycardia, no recent hospitalaisation or immobilization ,  -will do venous doppler  -Cardio consult  -pulm consult   -echo    #HENRIQUE on CKDstage 3  will hold diuretics for 1 day and monitor BMP  hold metformin    #hx CAD/stents/CABGx4  continue home meds    # hx chronic systolic CHF compensated  continue home meds, except hold diuretics for 1 day due to HENRIQUE    #hx afib on xarelto   continue xarelto , dose reduced for renal dosing  and digoxin dose reduced for renal dosing     #hx DM type2   hold metformin   ISS    # DVT prophylaxis  -on xarelto

## 2018-08-07 NOTE — PATIENT PROFILE ADULT. - PSH
H/O spinal fusion  1997  History of laminectomy    S/P CABG x 3  1997  S/P carotid endarterectomy  left, S/P left carotid stent 2000  S/P lobectomy of lung  right upper 1998  S/P TURP    Stented coronary artery  2002, 2003

## 2018-08-07 NOTE — H&P ADULT - NSHPPHYSICALEXAM_GEN_ALL_CORE
PHYSICAL EXAM:    Daily Height in cm: 177.8 (06 Aug 2018 19:01)    Daily     ICU Vital Signs Last 24 Hrs  T(C): 36.8 (07 Aug 2018 04:46), Max: 36.8 (07 Aug 2018 04:46)  T(F): 98.2 (07 Aug 2018 04:46), Max: 98.2 (07 Aug 2018 04:46)  HR: 75 (07 Aug 2018 04:46) (45 - 81)  BP: 123/70 (07 Aug 2018 04:46) (123/70 - 157/60)  BP(mean): 81 (07 Aug 2018 00:27) (81 - 81)  ABP: --  ABP(mean): --  RR: 18 (07 Aug 2018 04:46) (17 - 20)  SpO2: 97% (07 Aug 2018 04:46) (96% - 100%)      Constitutional: NAD, appears weak  HEENT: Atraumatic, DIANE, Normal, No congestion  Respiratory: Breath Sounds normal, no rhonchi/wheeze  Cardiovascular:  S1S2 irregular;  Gastrointestinal: Abdomen soft, non tender, Bowel Ssounds present  Extremities: No edema, peripheral pulses present, no calf tenderness  Neurological: AAO x 3, no gross focal motor deficits    Back: No CVA tenderness   Musculoskeletal: non tender  Breasts: Deferred  Genitourinary: deferred  Rectal: Deferred

## 2018-08-08 ENCOUNTER — TRANSCRIPTION ENCOUNTER (OUTPATIENT)
Age: 78
End: 2018-08-08

## 2018-08-08 VITALS — WEIGHT: 150.58 LBS

## 2018-08-08 LAB
AMYLASE P1 CFR SERPL: 64 U/L — SIGNIFICANT CHANGE UP (ref 25–115)
ANION GAP SERPL CALC-SCNC: 11 MMOL/L — SIGNIFICANT CHANGE UP (ref 5–17)
BASOPHILS # BLD AUTO: 0.04 K/UL — SIGNIFICANT CHANGE UP (ref 0–0.2)
BASOPHILS NFR BLD AUTO: 0.5 % — SIGNIFICANT CHANGE UP (ref 0–2)
BUN SERPL-MCNC: 29 MG/DL — HIGH (ref 7–23)
CALCIUM SERPL-MCNC: 9.8 MG/DL — SIGNIFICANT CHANGE UP (ref 8.5–10.1)
CHLORIDE SERPL-SCNC: 103 MMOL/L — SIGNIFICANT CHANGE UP (ref 96–108)
CO2 SERPL-SCNC: 25 MMOL/L — SIGNIFICANT CHANGE UP (ref 22–31)
CREAT SERPL-MCNC: 1.12 MG/DL — SIGNIFICANT CHANGE UP (ref 0.5–1.3)
D DIMER BLD IA.RAPID-MCNC: 308 NG/ML DDU — HIGH
DIGOXIN SERPL-MCNC: 0.85 NG/ML — SIGNIFICANT CHANGE UP (ref 0.8–2)
EOSINOPHIL # BLD AUTO: 0.17 K/UL — SIGNIFICANT CHANGE UP (ref 0–0.5)
EOSINOPHIL NFR BLD AUTO: 2.1 % — SIGNIFICANT CHANGE UP (ref 0–6)
GLUCOSE BLDC GLUCOMTR-MCNC: 143 MG/DL — HIGH (ref 70–99)
GLUCOSE BLDC GLUCOMTR-MCNC: 159 MG/DL — HIGH (ref 70–99)
GLUCOSE BLDC GLUCOMTR-MCNC: 247 MG/DL — HIGH (ref 70–99)
GLUCOSE SERPL-MCNC: 158 MG/DL — HIGH (ref 70–99)
HBA1C BLD-MCNC: 7.3 % — HIGH (ref 4–5.6)
HCT VFR BLD CALC: 41.5 % — SIGNIFICANT CHANGE UP (ref 39–50)
HGB BLD-MCNC: 14.3 G/DL — SIGNIFICANT CHANGE UP (ref 13–17)
IMM GRANULOCYTES NFR BLD AUTO: 0.4 % — SIGNIFICANT CHANGE UP (ref 0–1.5)
LIDOCAIN IGE QN: 208 U/L — SIGNIFICANT CHANGE UP (ref 73–393)
LYMPHOCYTES # BLD AUTO: 2.05 K/UL — SIGNIFICANT CHANGE UP (ref 1–3.3)
LYMPHOCYTES # BLD AUTO: 25 % — SIGNIFICANT CHANGE UP (ref 13–44)
MCHC RBC-ENTMCNC: 31.3 PG — SIGNIFICANT CHANGE UP (ref 27–34)
MCHC RBC-ENTMCNC: 34.5 GM/DL — SIGNIFICANT CHANGE UP (ref 32–36)
MCV RBC AUTO: 90.8 FL — SIGNIFICANT CHANGE UP (ref 80–100)
MONOCYTES # BLD AUTO: 0.89 K/UL — SIGNIFICANT CHANGE UP (ref 0–0.9)
MONOCYTES NFR BLD AUTO: 10.9 % — SIGNIFICANT CHANGE UP (ref 2–14)
NEUTROPHILS # BLD AUTO: 5.02 K/UL — SIGNIFICANT CHANGE UP (ref 1.8–7.4)
NEUTROPHILS NFR BLD AUTO: 61.1 % — SIGNIFICANT CHANGE UP (ref 43–77)
NRBC # BLD: 0 /100 WBCS — SIGNIFICANT CHANGE UP (ref 0–0)
PLATELET # BLD AUTO: 200 K/UL — SIGNIFICANT CHANGE UP (ref 150–400)
POTASSIUM SERPL-MCNC: 4.2 MMOL/L — SIGNIFICANT CHANGE UP (ref 3.5–5.3)
POTASSIUM SERPL-SCNC: 4.2 MMOL/L — SIGNIFICANT CHANGE UP (ref 3.5–5.3)
RBC # BLD: 4.57 M/UL — SIGNIFICANT CHANGE UP (ref 4.2–5.8)
RBC # FLD: 12.5 % — SIGNIFICANT CHANGE UP (ref 10.3–14.5)
SODIUM SERPL-SCNC: 139 MMOL/L — SIGNIFICANT CHANGE UP (ref 135–145)
WBC # BLD: 8.2 K/UL — SIGNIFICANT CHANGE UP (ref 3.8–10.5)
WBC # FLD AUTO: 8.2 K/UL — SIGNIFICANT CHANGE UP (ref 3.8–10.5)

## 2018-08-08 PROCEDURE — 99223 1ST HOSP IP/OBS HIGH 75: CPT

## 2018-08-08 PROCEDURE — 93010 ELECTROCARDIOGRAM REPORT: CPT

## 2018-08-08 PROCEDURE — 71275 CT ANGIOGRAPHY CHEST: CPT | Mod: 26

## 2018-08-08 RX ORDER — METFORMIN HYDROCHLORIDE 850 MG/1
1 TABLET ORAL
Qty: 0 | Refills: 0 | COMMUNITY

## 2018-08-08 RX ORDER — FONDAPARINUX SODIUM 2.5 MG/.5ML
1 INJECTION, SOLUTION SUBCUTANEOUS
Qty: 30 | Refills: 0
Start: 2018-08-08 | End: 2018-09-06

## 2018-08-08 RX ADMIN — Medication 0.06 MILLIGRAM(S): at 11:12

## 2018-08-08 RX ADMIN — Medication 2: at 08:13

## 2018-08-08 RX ADMIN — Medication 81 MILLIGRAM(S): at 11:12

## 2018-08-08 RX ADMIN — Medication 4: at 11:12

## 2018-08-08 RX ADMIN — RIVAROXABAN 15 MILLIGRAM(S): KIT at 17:01

## 2018-08-08 NOTE — DISCHARGE NOTE ADULT - PROVIDER TOKENS
FREE:[LAST:[puja],FIRST:[magdalene],PHONE:[(   )    -],FAX:[(   )    -],ADDRESS:[[address] [place]  [phone]]]

## 2018-08-08 NOTE — CONSULT NOTE ADULT - SUBJECTIVE AND OBJECTIVE BOX
HPI:  79 yo male with PMH of CAD s/p CABGx4 and stentsx3, CHF EF 40% 2017,  HTN, HLD, DM2, prostate CA, h/o lung malignancy s/p radiation, h/o MANUEL  partial lobectomy for recurrent PNA, A. fib on xarelto, COPD  presents to the ED c/o SOB x3 days, right sided sharp chest pain x2 days ago mostly constant, but improved on rest and worse on exertion . Pt reports his sugar was high (290). Denies fever, HA, nausea.cough, leg swelling, no calf pain .no recent hx of hospitalization or immobilization or air travel in past 6 months Pt takes Xarelto. Pt sent from PMD office yesterday. No recent weight gain.  In ED  chest pain and SOB resolved , troponins x2 neg, afeberile     CXR no PNA ,no CHF, no acute changes from prior CXR done 2017 per my interpretation  EKG Afib 77, with intermittent PVCs, nonspecific ST/T waves     Fam Hx- no pertinent family hx in 1st degree relatives (07 Aug 2018 08:02)    8/8: pt seen earlier today.  no CP or SOB.  no cough, sputum, hemoptysis. denies wt loss or poor appetite.  h.o. s/p RT to R lung cancer in Feb, 2018.       PAST MEDICAL & SURGICAL HISTORY:  Osteoarthritis  Lung cancer  DM (diabetes mellitus)  Cataracts, bilateral: lens implants  Prostate ca  BPH (benign prostatic hyperplasia): &quot;chemo shrink prostate&quot;  Carotid stenosis, left  CAD (coronary artery disease): S/P coronary artery stents 2002, 2003  HLD (hyperlipidemia)  HTN (hypertension)  History of laminectomy  H/O spinal fusion: 1997  S/P CABG x 3: 1997  S/P lobectomy of lung: right upper 1998  S/P TURP  Stented coronary artery: 2002, 2003  S/P carotid endarterectomy: left, S/P left carotid stent 2000      MEDICATIONS  (STANDING):  aspirin enteric coated 81 milliGRAM(s) Oral daily  atorvastatin 20 milliGRAM(s) Oral at bedtime  dextrose 5%. 1000 milliLiter(s) (50 mL/Hr) IV Continuous <Continuous>  dextrose 50% Injectable 12.5 Gram(s) IV Push once  dextrose 50% Injectable 25 Gram(s) IV Push once  dextrose 50% Injectable 25 Gram(s) IV Push once  digoxin     Tablet 0.0625 milliGRAM(s) Oral daily  insulin lispro (HumaLOG) corrective regimen sliding scale   SubCutaneous three times a day before meals  insulin lispro (HumaLOG) corrective regimen sliding scale   SubCutaneous at bedtime  rivaroxaban 15 milliGRAM(s) Oral every 24 hours  tamsulosin Oral Tab/Cap - Peds 0.4 milliGRAM(s) Oral at bedtime    MEDICATIONS  (PRN):  dextrose 40% Gel 15 Gram(s) Oral once PRN Blood Glucose LESS THAN 70 milliGRAM(s)/deciliter  glucagon  Injectable 1 milliGRAM(s) IntraMuscular once PRN Glucose LESS THAN 70 milligrams/deciliter      Allergies    morphine (Hypotension)  oxycodone (Other)    Intolerances        SOCIAL HISTORY: Denies tobacco, etoh abuse or illicit drug use    FAMILY HISTORY:  No pertinent family history in first degree relatives      Vital Signs Last 24 Hrs  T(C): 36.1 (08 Aug 2018 16:11), Max: 36.5 (08 Aug 2018 04:36)  T(F): 96.9 (08 Aug 2018 16:11), Max: 97.7 (08 Aug 2018 04:36)  HR: 61 (08 Aug 2018 16:11) (41 - 72)  BP: 162/57 (08 Aug 2018 16:11) (126/85 - 162/57)  BP(mean): --  RR: 18 (08 Aug 2018 16:11) (17 - 18)  SpO2: 99% (08 Aug 2018 16:11) (94% - 100%)    REVIEW OF SYSTEMS:    CONSTITUTIONAL:  As per HPI.  HEENT:  Eyes:  No diplopia or blurred vision. ENT:  No earache, sore throat or runny nose.  CARDIOVASCULAR: see above  RESPIRATORY:  see above  GASTROINTESTINAL:  No nausea, vomiting or diarrhea.  GENITOURINARY:  No dysuria, frequency or urgency.  MUSCULOSKELETAL:  As per HPI.  SKIN:  No change in skin, hair or nails.  NEUROLOGIC:  No paresthesias, fasciculations, seizures or weakness.  PSYCHIATRIC:  No disorder of thought or mood.  ENDOCRINE:  No heat or cold intolerance, polyuria or polydipsia.  HEMATOLOGICAL:  No easy bruising or bleedings:  .     PHYSICAL EXAMINATION:    GENERAL APPEARANCE:  Pt. is not currently dyspneic, in no distress. Pt. is alert, oriented, and pleasant.  HEENT:  Pupils are normal and react normally. No icterus. Mucous membranes well colored.  NECK:  Supple. No lymphadenopathy. Jugular venous pressure not elevated. Carotids equal.   HEART:   The cardiac impulse has a normal quality. HR 50. 1/6 sys murmner.  CHEST:  Chest is clear to auscultation. decreased aud BS's bilat.  ABDOMEN:  Soft and nontender.   EXTREMITIES:  There is no cyanosis, clubbing or edema.   SKIN:  No rash or significant lesions are noted.  Neuro: Alert, awake, and O x 3.      LABS:                        14.3   8.20  )-----------( 200      ( 08 Aug 2018 05:02 )             41.5     08-08    139  |  103  |  29<H>  ----------------------------<  158<H>  4.2   |  25  |  1.12    Ca    9.8      08 Aug 2018 05:02    TPro  7.6  /  Alb  3.9  /  TBili  1.4<H>  /  DBili  x   /  AST  15  /  ALT  17  /  AlkPhos  75  08-06    LIVER FUNCTIONS - ( 06 Aug 2018 19:36 )  Alb: 3.9 g/dL / Pro: 7.6 gm/dL / ALK PHOS: 75 U/L / ALT: 17 U/L / AST: 15 U/L / GGT: x           PT/INR - ( 06 Aug 2018 19:36 )   PT: 19.8 sec;   INR: 1.81 ratio         PTT - ( 06 Aug 2018 19:36 )  PTT:40.4 sec  CARDIAC MARKERS ( 07 Aug 2018 12:36 )  0.018 ng/mL / x     / x     / x     / x      CARDIAC MARKERS ( 06 Aug 2018 20:24 )  <0.015 ng/mL / x     / x     / x     / x      CARDIAC MARKERS ( 06 Aug 2018 19:36 )  0.018 ng/mL / x     / x     / x     / x                RADIOLOGY & ADDITIONAL STUDIES:     EXAM:  CT ANGIO CHEST PE PROTOCOL IC                            PROCEDURE DATE:  08/08/2018          INTERPRETATION:  CT ANGIO CHEST PE PROTOCOL IC    CLINICAL INDICATION: Shortness of breath. r/o PE. Lung malignancy status   post radiation and right upper lobe partial lobectomy. Recurrent   pneumonia.    TECHNIQUE: Contrast enhanced CT pulmonary angiogram was performed.   Multiplanar CT and HRCT images were reviewed. Maximum intensity   projection (MIP) images are reconstructed as per CT angiography protocol.   Images were acquired during the administration of 90 Omnipaque 350 IV   contrast. 10 cc of Omnipaque was discarded. This study was performed   using automatic exposure control (radiation dose reduction software) to   obtain a diagnostic image quality scan with patient dose as low as   reasonably achievable.    COMPARISON: Chest CT dated 10/18/2017 and 6/5/2014.    PULMONARY ARTERIES: No pulmonary embolism.    LUNGS, AIRWAYS: Radiopaque chain sutures at the medial right apex   consistent with prior wedge resection.     There is a consolidated infiltrate within the lateral right lower lobe   with air bronchograms, similar in appearance to chest CT dated   10/18/2017. Findings may represent recurrent pneumonia. Follow-up until   resolution is advised. Chronic mild pleural/parenchymal scarring at the   right lung base.    Small subcentimeter focus of consolidation/groundglass infiltrate within   the superior left lower lobe, likely representing bronchiolitis or trace   aspiration.     The trachea and mainstem/segmental bronchi are patent. Evidence for   tracheomalacia with evidence for a mildly dilated 2.6 cm (AP diameter)   trachea.    Small biapical blebs.     PLEURA: No pleural effusion. Remonstration of a chronic loculated small   posteromedial right pneumothorax, unchanged since chest CT of 10/oh cc of   17.    HEART AND VESSELS: Normal heart size. Sternotomy wires and mediastinal   surgical clips consistent with CABG. No pericardial effusion. Normal   caliber thoracic aorta, with moderate scattered calcifications.    MEDIASTINUM, ELIZA, AXILLAE: No adenopathy.    UPPER ABDOMEN: There is a partially imaged nonspecific soft tissue   density measuring 3.0 x 2.0 cm at the head of the pancreas, with mild   adjacent fat stranding. Findings are nonspecific and may represent mild   acute pancreatitis and/or primary pancreatic lesion. Fatty atrophy of the   pancreas is again noted. Redemonstration of cholelithiasis     BONES AND CHEST WALL: Remonstration of bilateral gynecomastia. Chronic   right sixth, seventh, and eighth posterior lateral right rib deformities,   likely related postsurgical changes or remote traumatic injury.    IMPRESSION:   No pulmonary embolism.    Lateral right lower lobe consolidated infiltrate, similar in appearance   to chest CT of 10/18/17. Findings may represent recurrent pneumonia in   the correct clinical setting. Follow-up until resolution is advised.    Partially imaged nonspecific 3.0 x 2.0 cm soft tissue density within the   head of the pancreas with mild adjacent fat stranding. Recommend   abdominal/pelvic CT with IV contrast for further characterization.   Correlate with lipase/amylase.    Chronic loculated small posteromedial right pneumothorax, unchanged since   chest CT of 10/18/17.

## 2018-08-08 NOTE — DISCHARGE NOTE ADULT - CARE PLAN
Principal Discharge DX:	Chest pain  Goal:	f/u with cardio for outpatient stress test and f/u with PMD in 1 week and check BMP in 1 week and need f/u for pancreatic mass/outpatient MRCP vs CT A/P with IIV con  Assessment and plan of treatment:	see discharge summary

## 2018-08-08 NOTE — CONSULT NOTE ADULT - ASSESSMENT
Density R lateral CXR and CT scan unchanged c/w to 10/2017.  s/p RT treatment  of R lung cancer in 2/2018.  To continue outpatient following with previous pulmonary and oncology specialists.      Continue following with cardiolgy.    Outpatient evaluation of pancrease as noted per primary doctor.    Patient for d/c home today.

## 2018-08-08 NOTE — CONSULT NOTE ADULT - ASSESSMENT
patient with chest pain-right sided-atypical in location and nature with some exacerbation due to exertion and SOB.  Since in hosptial, no further symptoms.  PAtient did have lung cancer recently and radiation to chest.  Patient did not have EP w/u in [place] and was felt to have vagal event.  Current chest pains with negative troponin and EKG with   1-CP-patient with atypical symptoms but has big CAD history-w/u as outpatient with stress testing vs cath by his cardiologist.  D-dimer should be sent as patient with underlying malignancy should be evaluated-if negative-no CTA needed.  ECHO with EF in the 40's (unchanged) and PAP 20's-normal  2-arrhythmia-patient with tachy arnaud with HR in 30's with 2 second pauses and up to 120's.  Refused PPM here  If no further w/u here, may consider d/c and w/u as outpatient.  If further symptoms, should get nuclear stress test

## 2018-08-08 NOTE — DISCHARGE NOTE ADULT - MEDICATION SUMMARY - MEDICATIONS TO CHANGE
I will SWITCH the dose or number of times a day I take the medications listed below when I get home from the hospital:    rivaroxaban 10 mg oral tablet  -- 1 tab(s) by mouth once a day    Xarelto 20 mg oral tablet  -- 1 tab(s) by mouth once a day (in the evening)

## 2018-08-08 NOTE — DISCHARGE NOTE ADULT - MEDICATION SUMMARY - MEDICATIONS TO TAKE
I will START or STAY ON the medications listed below when I get home from the hospital:    spironolactone 25 mg oral tablet  -- 1 tab(s) by mouth once a day  -- Indication: For .    aspirin 81 mg oral tablet  -- 1 tab(s) by mouth once a day  -- Indication: For .    tamsulosin 0.4 mg oral capsule  -- 1 cap(s) by mouth once a day (at bedtime)  -- Indication: For .    Digitek 125 mcg (0.125 mg) oral tablet  -- 1 tab(s) by mouth once a day  -- Indication: For .    Xarelto 15 mg oral tablet  -- 1 tab(s) by mouth every 24 hours  -- Indication: For for afib,     metFORMIN 500 mg oral tablet  -- 1 tab(s) by mouth once a day **As per wife**  -- Indication: For resume metformin  8/10 as you received IV contrast in hospital    atorvastatin 20 mg oral tablet  -- 1 tab(s) by mouth once a day (at bedtime)  -- Indication: For .    metOLazone 2.5 mg oral tablet  -- 1 tab(s) by mouth 2 times a week on Mondays and Thursdays  -- Indication: For .

## 2018-08-08 NOTE — DISCHARGE NOTE ADULT - PLAN OF CARE
f/u with cardio for outpatient stress test and f/u with PMD in 1 week and check BMP in 1 week and need f/u for pancreatic mass/outpatient MRCP vs CT A/P with IIV con see discharge summary

## 2018-08-08 NOTE — CONSULT NOTE ADULT - SUBJECTIVE AND OBJECTIVE BOX
CHIEF COMPLAINT: Patient is a 78y old  Male who presents with a chief complaint of Dizzy, couldn't catch his breath, family made him come in (07 Aug 2018 17:41)      HPI:  79 yo male with PMH of CAD s/p CABGx4 and stentsx3, CHF EF 40% 2017,  HTN, HLD, DM2, prostate CA, h/o lung malignancy s/p radiation, h/o RUL  partial lobectomy for recurrent PNA, A. fib on xarelto, COPD  presents to the ED c/o SOB x3 days, right sided sharp chest pain x2 days ago mostly constant, but improved on rest and worse on exertion . Pt reports his sugar was high (290). Denies fever, HA, nausea.cough, leg swelling, no calf pain .no recent hx of hospitalization or immobilization or air travel in past 6 months Pt takes Xarelto. Pt sent from PMD office yesterday. No recent weight gain.  In ED  chest pain and SOB resolved , troponins x2 neg, afeberile     CXR no PNA ,no CHF, no acute changes from prior CXR done 2017 per my interpretation  EKG Afib 77, with intermittent PVCs, nonspecific ST/T waves     Fam Hx- no pertinent family hx in 1st degree relatives (07 Aug 2018 08:02)    8/8-patient appears to have chest pains that worsened with exertion accompanied with SOB.  10 months ago-saw patient for syncopal episode in bathroom with conduction disease.  patient was transferred to [facility] where his cardiologist was.  No EPS but was held for a week and deemed that no PPM or ICD needed.  Did have lung cancer at the time and was treated with radiation    PMHx: PAST MEDICAL & SURGICAL HISTORY:  Osteoarthritis  Lung cancer  DM (diabetes mellitus)  Cataracts, bilateral: lens implants  Prostate ca  BPH (benign prostatic hyperplasia): &quot;chemo shrink prostate&quot;  Carotid stenosis, left  CAD (coronary artery disease): S/P coronary artery stents ,   HLD (hyperlipidemia)  HTN (hypertension)  History of laminectomy  H/O spinal fusion:   S/P CABG x 3:   S/P lobectomy of lung: right upper   S/P TURP  Stented coronary artery: ,   S/P carotid endarterectomy: left, S/P left carotid stent         Soc Hx:       Allergies: Allergies    morphine (Hypotension)  oxycodone (Other)    Intolerances          REVIEW OF SYSTEMS:    CONSTITUTIONAL: No weakness, fevers or chills  EYES/ENT: No visual changes;  No vertigo or throat pain   NECK: No pain or stiffness  RESPIRATORY: No cough, wheezing, hemoptysis; No shortness of breath  CARDIOVASCULAR: No chest pain or palpitations  GASTROINTESTINAL: No abdominal or epigastric pain. No nausea, vomiting, or hematemesis; No diarrhea or constipation. No melena or hematochezia.  GENITOURINARY: No dysuria, frequency or hematuria  NEUROLOGICAL: No numbness or weakness  SKIN: No itching, burning, rashes, or lesions   All other review of systems is negative unless indicated above    Vital Signs Last 24 Hrs  T(C): 36.5 (08 Aug 2018 04:36), Max: 37 (07 Aug 2018 15:12)  T(F): 97.7 (08 Aug 2018 04:36), Max: 98.6 (07 Aug 2018 15:12)  HR: 41 (08 Aug 2018 04:36) (41 - 70)  BP: 153/60 (08 Aug 2018 04:36) (108/85 - 167/69)  BP(mean): --  RR: 17 (07 Aug 2018 20:48) (17 - 18)  SpO2: 96% (08 Aug 2018 04:36) (94% - 100%)    I&O's Summary      CAPILLARY BLOOD GLUCOSE      POCT Blood Glucose.: 160 mg/dL (07 Aug 2018 22:25)  POCT Blood Glucose.: 136 mg/dL (07 Aug 2018 18:46)  POCT Blood Glucose.: 259 mg/dL (07 Aug 2018 12:12)  POCT Blood Glucose.: 148 mg/dL (07 Aug 2018 09:14)      PHYSICAL EXAM:   Constitutional: NAD, awake and alert, well-developed  HEENT: PERR, EOMI, Normal Hearing, MMM  Neck: JVD  Respiratory: Breath sounds are clear bilaterally, No wheezing, rales or rhonchi  Cardiovascular: S1 and S2, regular rate and rhythm, Murmurs  Gastrointestinal: Bowel Sounds present, soft, nontender, nondistended, no guarding, no rebound  Extremities: No peripheral edema  Vascular: 2+ peripheral pulses  Neurological: A/O x 3, no focal deficits  Musculoskeletal: 5/5 strength b/l upper and lower extremities  Skin: No rashes    MEDICATIONS:  MEDICATIONS  (STANDING):  aspirin enteric coated 81 milliGRAM(s) Oral daily  atorvastatin 20 milliGRAM(s) Oral at bedtime  dextrose 5%. 1000 milliLiter(s) (50 mL/Hr) IV Continuous <Continuous>  dextrose 50% Injectable 12.5 Gram(s) IV Push once  dextrose 50% Injectable 25 Gram(s) IV Push once  dextrose 50% Injectable 25 Gram(s) IV Push once  digoxin     Tablet 0.0625 milliGRAM(s) Oral daily  insulin lispro (HumaLOG) corrective regimen sliding scale   SubCutaneous three times a day before meals  insulin lispro (HumaLOG) corrective regimen sliding scale   SubCutaneous at bedtime  rivaroxaban 15 milliGRAM(s) Oral every 24 hours  tamsulosin Oral Tab/Cap - Peds 0.4 milliGRAM(s) Oral at bedtime      LABS: All Labs Reviewed:                        14.3   8  )-----------( 200      ( 08 Aug 2018 05:02 )             41.5     08-    139  |  103  |  29<H>  ----------------------------<  158<H>  4.2   |  25  |  1.12    Ca    9.8      08 Aug 2018 05:02    TPro  7.6  /  Alb  3.9  /  TBili  1.4<H>  /  DBili  x   /  AST  15  /  ALT  17  /  AlkPhos  75  08-06    PT/INR - ( 06 Aug 2018 19:36 )   PT: 19.8 sec;   INR: 1.81 ratio         PTT - ( 06 Aug 2018 19:36 )  PTT:40.4 sec  CARDIAC MARKERS ( 07 Aug 2018 12:36 )  0.018 ng/mL / x     / x     / x     / x      CARDIAC MARKERS ( 06 Aug 2018 20:24 )  <0.015 ng/mL / x     / x     / x     / x      CARDIAC MARKERS ( 06 Aug 2018 19:36 )  0.018 ng/mL / x     / x     / x     / x            Blood Culture:   lipid profile       RADIOLOGY:  < from: Xray Chest 2 Views PA/Lat (18 @ 20:25) >  Persistent right lung base opacity may be secondary to neoplasm versus   scarring.  Recommend noncontrast CT to further evaluate.    < from: US Duplex Venous Lower Ext Complete, Bilateral (18 @ 10:09) >  No evidence of bilateral lower extremity deep venous thrombosis. Note   that the left calf veins were incompletely visualized.    < end of copied text >      EK/7-atrial fibrillation 77bpm, PVC's, LAD  -atrial fibrillation 75bpm PVC, LAD  no dynamic st/t wave changes    Telemetry:  atrial [id number] with PVC's    ECHO:  < from: Transthoracic Echocardiogram (18 @ 11:13) >   Moderate (2+) mitral regurgitation is present.   Mild mitral annular calcification is present.   Fibrocalcific changes noted to the Aortic valve leaflets with preserved   leaflet excursion.   Mild (1+) tricuspid valve regurgitation is present.   Normal appearing pulmonic valve structure and function.   Trace pulmonic valvular regurgitation is present.   The left atrium is moderately dilated.   The left ventricle is normal in size, wall thickness.   Estimated left ventricular ejection fraction is 40-45 %.    < end of copied text >

## 2018-08-08 NOTE — DISCHARGE NOTE ADULT - PATIENT PORTAL LINK FT
You can access the [facility], offered by [facility], by registering with the following website: [email or web address]

## 2018-08-08 NOTE — DISCHARGE NOTE ADULT - HOSPITAL COURSE
Reason for Admission: SOB, chest tightness x2 days	  History of Present Illness: 	  79 yo male with PMH of CAD s/p CABGx4 and stentsx3, CHF EF 40% 2017,  HTN, HLD, DM2, prostate CA, h/o lung malignancy s/p radiation, h/o RUL  partial lobectomy for recurrent PNA, A. fib on xarelto, COPD  presents to the ED c/o SOB x3 days, right sided sharp chest pain x2 days ago mostly constant, but improved on rest and worse on exertion . Pt reports his sugar was high (290). Denies fever, HA, nausea.cough, leg swelling, no calf pain .no recent hx of hospitalization or immobilization or air travel in past 6 months Pt takes Xarelto. Pt sent from PMD office yesterday. No recent weight gain.  In ED  chest pain and SOB resolved , troponins x2 neg, afeberile     CXR no PNA ,no CHF, no acute changes from prior CXR done 2017 per my interpretation  EKG Afib 77, with intermittent PVCs, nonspecific ST/T waves     Fam Hx- no pertinent family hx in 1st degree relatives   physical exam  Constitutional: NAD, ambulating in room ,asymptomatic  	HEENT: Atraumatic, DIANE, Normal, No congestion  	Respiratory: Breath Sounds normal, no rhonchi/wheeze  	Cardiovascular:  S1S2 irregular;  	Gastrointestinal: Abdomen soft, non tender, Bowel Ssounds present  	Extremities: No edema, peripheral pulses present, no calf tenderness  	Neurological: AAO x 3, no gross focal motor deficits    	Back: No CVA tenderness   · Assessment		  79 yo male with PMH of CAD s/p CABGx4 and stentsx3, CHF EF 40% 2017,  HTN, HLD, DM2, prostate CA, h/o lung malignancy s/p radiation, h/o RUL  partial lobectomy for recurrent PNA, A. fib on xarelto, COPD  presents to the ED c/o SOB x3 days, right sided sharp chest pain x2 days ago mostly constant, but improved on rest and worse on exertion . Pt reports his sugar was high (290). Denies fever, HA, nausea.cough, leg swelling, no calf pain .no recent hx of hospitalization or immobilization or air travel in past 6 months Pt takes Xarelto. Pt sent from PMD office yesterday. No recent weight gain.  In ED  chest pain and SOB resolved , troponins x2 neg, afeberile     CXR no PNA ,no CHF, no acute changes from prior CXR done 2017 per my interpretation  EKG Afib 77, with intermittent PVCs, nonspecific ST/T waves    # Chest pain/SOB x2 days / now resolved  ACS ruled out  - Tele showed tachybrady rhythm with 2 sec pauses and HR 30's to 120  -echo- EF 40% unchanged  -troponins x3 negative   patient refused PPM and further w/u in hospital  cardiology cleared patient for discharge with follow up as outpatient with patient's cardiology for outpatient stress test  CTA negative for PE, venous doppler negative for DVT        #HENRIQUE  secondary to diuretics and hypovolemia resolved after transiently holding diuretics on CKDstage 3  resume diuretic  and resume metformin 8/10 as he recieved IV contrast study for CTA on 8/8/18  I explained this to patient  needs BMP check in 1 week    # incidental finding of pancreatic mass to head of pancreas with mild fat stranding on CTA chest  patient clinically asymptomatic   lipase and amylase negative   patient does not want it worked up in patient with CT A/P with IV con or MRCP  I called his PCP Dr Hernandez [phone] and spoke with her and recommended f/u sooner than later with MRCP vs CT A/P to r/o pancreatic mass  Also patient has RLL consolidation unchnaged since 10/2017 needs follow  up as outpatient  with dr hernandez who is also pulmonary - i discussed this with Dr Hernandez who agreed    #hx CAD/stents/CABGx4  continue home meds    # hx chronic systolic CHF compensated  continue home meds,     #hx afib on xarelto   continue xarelto  15mg daily which is renally dosed       #hx DM type2  resume metformin 8/10      # DVT prophylaxis  -on xarelto Reason for Admission: SOB, chest tightness x2 days	  History of Present Illness: 	  77 yo male with PMH of CAD s/p CABGx4 and stentsx3, CHF EF 40% 2017,  HTN, HLD, DM2, prostate CA, h/o lung malignancy s/p radiation, h/o RUL  partial lobectomy for recurrent PNA, A. fib on xarelto, COPD  presents to the ED c/o SOB x3 days, right sided sharp chest pain x2 days ago mostly constant, but improved on rest and worse on exertion . Pt reports his sugar was high (290). Denies fever, HA, nausea.cough, leg swelling, no calf pain .no recent hx of hospitalization or immobilization or air travel in past 6 months Pt takes Xarelto. Pt sent from PMD office yesterday. No recent weight gain.  In ED  chest pain and SOB resolved , troponins x2 neg, afeberile     CXR no PNA ,no CHF, no acute changes from prior CXR done 2017 per my interpretation  EKG Afib 77, with intermittent PVCs, nonspecific ST/T waves     Fam Hx- no pertinent family hx in 1st degree relatives   physical exam  Constitutional: NAD, ambulating in room ,asymptomatic  	HEENT: Atraumatic, DIANE, Normal, No congestion  	Respiratory: Breath Sounds normal, no rhonchi/wheeze  	Cardiovascular:  S1S2 irregular;  	Gastrointestinal: Abdomen soft, non tender, Bowel Ssounds present  	Extremities: No edema, peripheral pulses present, no calf tenderness  	Neurological: AAO x 3, no gross focal motor deficits    	Back: No CVA tenderness   · Assessment		  77 yo male with PMH of CAD s/p CABGx4 and stentsx3, CHF EF 40% 2017,  HTN, HLD, DM2, prostate CA, h/o lung malignancy s/p radiation, h/o RUL  partial lobectomy for recurrent PNA, A. fib on xarelto, COPD  presents to the ED c/o SOB x3 days, right sided sharp chest pain x2 days ago mostly constant, but improved on rest and worse on exertion . Pt reports his sugar was high (290). Denies fever, HA, nausea.cough, leg swelling, no calf pain .no recent hx of hospitalization or immobilization or air travel in past 6 months Pt takes Xarelto. Pt sent from PMD office yesterday. No recent weight gain.  In ED  chest pain and SOB resolved , troponins x2 neg, afeberile     CXR no PNA ,no CHF, no acute changes from prior CXR done 2017 per my interpretation  EKG Afib 77, with intermittent PVCs, nonspecific ST/T waves    # Chest pain/SOB x2 days / now resolved  ACS ruled out  - Tele showed tachybrady rhythm with 2 sec pauses and HR 30's to 120  -echo- EF 40% unchanged  -troponins x3 negative   patient refused PPM and further w/u in hospital  cardiology cleared patient for discharge with follow up as outpatient with patient's cardiology for outpatient stress test  CTA negative for PE, venous doppler negative for DVT        #HENRIQUE  secondary to diuretics and hypovolemia resolved after transiently holding diuretics on CKDstage 3  resume diuretic  and resume metformin 8/10 as he recieved IV contrast study for CTA on 8/8/18  I explained this to patient  needs BMP check in 1 week    # incidental finding of pancreatic mass to head of pancreas with mild fat stranding on CTA chest  patient clinically asymptomatic   lipase and amylase negative   patient does not want it worked up in patient with CT A/P with IV con or MRCP  I called his PCP Dr Hernandez [phone] and spoke with her and recommended f/u sooner than later with MRCP vs CT A/P to r/o pancreatic mass  Also patient has RLL consolidation unchnaged since 10/2017 needs follow  up as outpatient  with dr hernandez who is also pulmonary - i discussed this with Dr Hernandez who agreed  lipase and amylase wnl    #hx CAD/stents/CABGx4  continue home meds    # hx chronic systolic CHF compensated  continue home meds,     #hx afib on xarelto   continue xarelto  15mg daily which is renally dosed       #hx DM type2  resume metformin 8/10      # DVT prophylaxis  -on xarelto

## 2018-08-14 DIAGNOSIS — Z79.899 OTHER LONG TERM (CURRENT) DRUG THERAPY: ICD-10-CM

## 2018-08-14 DIAGNOSIS — I50.22 CHRONIC SYSTOLIC (CONGESTIVE) HEART FAILURE: ICD-10-CM

## 2018-08-14 DIAGNOSIS — Z85.118 PERSONAL HISTORY OF OTHER MALIGNANT NEOPLASM OF BRONCHUS AND LUNG: ICD-10-CM

## 2018-08-14 DIAGNOSIS — Z79.82 LONG TERM (CURRENT) USE OF ASPIRIN: ICD-10-CM

## 2018-08-14 DIAGNOSIS — Z85.46 PERSONAL HISTORY OF MALIGNANT NEOPLASM OF PROSTATE: ICD-10-CM

## 2018-08-14 DIAGNOSIS — Z79.01 LONG TERM (CURRENT) USE OF ANTICOAGULANTS: ICD-10-CM

## 2018-08-14 DIAGNOSIS — N40.0 BENIGN PROSTATIC HYPERPLASIA WITHOUT LOWER URINARY TRACT SYMPTOMS: ICD-10-CM

## 2018-08-14 DIAGNOSIS — I49.5 SICK SINUS SYNDROME: ICD-10-CM

## 2018-08-14 DIAGNOSIS — R07.9 CHEST PAIN, UNSPECIFIED: ICD-10-CM

## 2018-08-14 DIAGNOSIS — N18.3 CHRONIC KIDNEY DISEASE, STAGE 3 (MODERATE): ICD-10-CM

## 2018-08-14 DIAGNOSIS — I48.2 CHRONIC ATRIAL FIBRILLATION: ICD-10-CM

## 2018-08-14 DIAGNOSIS — E78.5 HYPERLIPIDEMIA, UNSPECIFIED: ICD-10-CM

## 2018-08-14 DIAGNOSIS — E11.9 TYPE 2 DIABETES MELLITUS WITHOUT COMPLICATIONS: ICD-10-CM

## 2018-08-14 DIAGNOSIS — Z79.84 LONG TERM (CURRENT) USE OF ORAL HYPOGLYCEMIC DRUGS: ICD-10-CM

## 2018-08-14 DIAGNOSIS — R07.89 OTHER CHEST PAIN: ICD-10-CM

## 2018-08-14 DIAGNOSIS — Z88.5 ALLERGY STATUS TO NARCOTIC AGENT: ICD-10-CM

## 2018-08-14 DIAGNOSIS — Z95.5 PRESENCE OF CORONARY ANGIOPLASTY IMPLANT AND GRAFT: ICD-10-CM

## 2018-08-14 DIAGNOSIS — I13.0 HYPERTENSIVE HEART AND CHRONIC KIDNEY DISEASE WITH HEART FAILURE AND STAGE 1 THROUGH STAGE 4 CHRONIC KIDNEY DISEASE, OR UNSPECIFIED CHRONIC KIDNEY DISEASE: ICD-10-CM

## 2018-08-14 DIAGNOSIS — I25.10 ATHEROSCLEROTIC HEART DISEASE OF NATIVE CORONARY ARTERY WITHOUT ANGINA PECTORIS: ICD-10-CM

## 2018-08-14 DIAGNOSIS — K86.89 OTHER SPECIFIED DISEASES OF PANCREAS: ICD-10-CM

## 2018-08-14 DIAGNOSIS — Z87.891 PERSONAL HISTORY OF NICOTINE DEPENDENCE: ICD-10-CM

## 2019-01-16 ENCOUNTER — APPOINTMENT (OUTPATIENT)
Dept: INTERNAL MEDICINE | Facility: CLINIC | Age: 79
End: 2019-01-16
Payer: MEDICARE

## 2019-01-16 VITALS
HEART RATE: 64 BPM | WEIGHT: 153 LBS | TEMPERATURE: 97.6 F | RESPIRATION RATE: 16 BRPM | BODY MASS INDEX: 22.66 KG/M2 | SYSTOLIC BLOOD PRESSURE: 120 MMHG | DIASTOLIC BLOOD PRESSURE: 60 MMHG | HEIGHT: 69 IN | OXYGEN SATURATION: 98 %

## 2019-01-16 DIAGNOSIS — Z87.891 PERSONAL HISTORY OF NICOTINE DEPENDENCE: ICD-10-CM

## 2019-01-16 DIAGNOSIS — J43.9 EMPHYSEMA, UNSPECIFIED: ICD-10-CM

## 2019-01-16 DIAGNOSIS — J44.9 CHRONIC OBSTRUCTIVE PULMONARY DISEASE, UNSPECIFIED: ICD-10-CM

## 2019-01-16 DIAGNOSIS — C34.10 MALIGNANT NEOPLASM OF UPPER LOBE, UNSPECIFIED BRONCHUS OR LUNG: ICD-10-CM

## 2019-01-16 DIAGNOSIS — I48.91 UNSPECIFIED ATRIAL FIBRILLATION: ICD-10-CM

## 2019-01-16 DIAGNOSIS — E11.9 TYPE 2 DIABETES MELLITUS W/OUT COMPLICATIONS: ICD-10-CM

## 2019-01-16 DIAGNOSIS — I10 ESSENTIAL (PRIMARY) HYPERTENSION: ICD-10-CM

## 2019-01-16 DIAGNOSIS — R91.1 SOLITARY PULMONARY NODULE: ICD-10-CM

## 2019-01-16 DIAGNOSIS — I50.9 HEART FAILURE, UNSPECIFIED: ICD-10-CM

## 2019-01-16 DIAGNOSIS — R91.8 OTHER NONSPECIFIC ABNORMAL FINDING OF LUNG FIELD: ICD-10-CM

## 2019-01-16 DIAGNOSIS — C61 MALIGNANT NEOPLASM OF PROSTATE: ICD-10-CM

## 2019-01-16 DIAGNOSIS — K86.89 OTHER SPECIFIED DISEASES OF PANCREAS: ICD-10-CM

## 2019-01-16 DIAGNOSIS — I25.10 ATHEROSCLEROTIC HEART DISEASE OF NATIVE CORONARY ARTERY W/OUT ANGINA PECTORIS: ICD-10-CM

## 2019-01-16 DIAGNOSIS — E78.00 PURE HYPERCHOLESTEROLEMIA, UNSPECIFIED: ICD-10-CM

## 2019-01-16 PROCEDURE — 99204 OFFICE O/P NEW MOD 45 MIN: CPT

## 2019-01-16 RX ORDER — METOLAZONE 2.5 MG/1
2.5 TABLET ORAL WEEKLY
Refills: 0 | Status: ACTIVE | COMMUNITY

## 2019-01-16 RX ORDER — ASPIRIN 81 MG
81 TABLET, DELAYED RELEASE (ENTERIC COATED) ORAL DAILY
Refills: 0 | Status: ACTIVE | COMMUNITY

## 2019-01-16 RX ORDER — OMEGA-3/DHA/EPA/FISH OIL 300-1000MG
400 CAPSULE ORAL DAILY
Refills: 0 | Status: ACTIVE | COMMUNITY

## 2019-01-16 RX ORDER — RIVAROXABAN 10 MG/1
10 TABLET, FILM COATED ORAL DAILY
Refills: 0 | Status: ACTIVE | COMMUNITY

## 2019-01-16 RX ORDER — SPIRONOLACTONE 25 MG/1
25 TABLET ORAL DAILY
Refills: 0 | Status: ACTIVE | COMMUNITY

## 2019-01-16 RX ORDER — ATORVASTATIN CALCIUM 20 MG/1
20 TABLET, FILM COATED ORAL
Refills: 0 | Status: ACTIVE | COMMUNITY

## 2019-01-16 RX ORDER — MULTIVIT-MIN/FOLIC/VIT K/LYCOP 400-300MCG
25 MCG TABLET ORAL DAILY
Refills: 0 | Status: ACTIVE | COMMUNITY

## 2019-01-16 RX ORDER — DIGOXIN 0.12 MG/1
125 TABLET ORAL DAILY
Refills: 0 | Status: ACTIVE | COMMUNITY

## 2019-01-16 NOTE — HISTORY OF PRESENT ILLNESS
[FreeTextEntry1] : establish care.  [de-identified] : The patient comes in to establish care. He has a hx of DM-2, HTN, HLD, COPD, Atrial Fibrillation, CAD s/p stent and CABG, Prostate Ca and Lung Ca. He was seen by  over 10 years ago. Has had PCP on and off, but does not see consistently. Last saw previous PCP Dr.Kenneth Mcgee 3-4 years ago. States he manages his diabete on his own. He eats a lot of junk food and his sugars usually run between 115-180, not usually above 200. \par He does follow with [place] in [facility]pulmonary Dr.Angela Hernandez and Cardiology Dr.Shepard Lam in [place] regularly. He also is seen currently at [facility] for his Lung Ca for which he has had RUL lobectomy. [facility]. [facility] and was told his PSA was high and to come in to further discuss this next week. He has seen a Urologist in the past for his Prostate ca and has had RTX. \par He also has had recent hospitalization last summer at  for bradycardia and dizziness. It was recommended to have PPM but he refused and was transferred to [facility] in [facility]. He had a total hospital stay for 3 weeks. He was found to have ? pancreatic mass on CT chest and was to have this followed with PCP, but never did and does not recall being told about this. \par Patient denies any cp, sob,abdominal pain, nausea, vomiting, palpitations, fever, chills, constipation, diarrhea.\par

## 2019-01-16 NOTE — HISTORY OF PRESENT ILLNESS
[FreeTextEntry1] : establish care.  [de-identified] : The patient comes in to establish care. He has a hx of DM-2, HTN, HLD, COPD, Atrial Fibrillation, CAD s/p stent and CABG, Prostate Ca and Lung Ca. He was seen by  over 10 years ago. Has had PCP on and off, but does not see consistently. Last saw previous PCP Dr.Kenneth Mcgee 3-4 years ago. States he manages his diabete on his own. He eats a lot of junk food and his sugars usually run between 115-180, not usually above 200. \par He does follow with [place] in [facility]-pulmonary Dr.Angela Hernandez and Cardiology Dr.Shepard Lam in [place] regularly. He also is seen currently at [facility] for his Lung Ca for which he has had RUL lobectomy. [facility]. [facility] and was told his PSA was high and to come in to further discuss this next week. He has seen a Urologist in the past for his Prostate ca and has had RTX. \par He also has had recent hospitalization last summer at  for bradycardia and dizziness. It was recommended to have PPM but he refused and was transferred to [facility] in [facility]. He had a total hospital stay for 3 weeks. He was found to have ? pancreatic mass on CT chest and was to have this followed with PCP, but never did and does not recall being told about this. \par Patient denies any cp, sob,abdominal pain, nausea, vomiting, palpitations, fever, chills, constipation, diarrhea.\par

## 2019-01-16 NOTE — HEALTH RISK ASSESSMENT
[0] : 2) Feeling down, depressed, or hopeless: Not at all (0) [Smoke Detector] : no smoke detector [Carbon Monoxide Detector] : no carbon monoxide detector [Seat Belt] : does not use seat belt [Sunscreen] : does not use sunscreen

## 2019-01-16 NOTE — PLAN
[FreeTextEntry1] : 1.Pancreatic mass-he declines any imaging including CT Abd/Pelvis or MRCP, referral to GI at this time. Would like to have his records transferred here before any further work up done. No symptoms as this time. \par \par 2. DM-2: last hba1c at 7.3 in 8/18. [facility] He will get these records to our office. \par Counseled extensively on diabetic diet compliance. Will continue current medication regimen. \par \par 3. HTN: well controlled on current medications. \par 4. HLD: will get lab records, continue current therapy. \par 5. CAD,CABG, Atrial fibrillation: f/u with cardiology, continue current medications. \par 6. Emphysema: continue current medication and f/u with pulmonary\par 7. Lung ca s/p RUL lobectomy: continue follow up with [facility], will get records. \par 8. Prostate Ca: s/p RTX. Notes he did not have prostatectomy. Recent PSA done at [facility], will see them next week to follow up with urology as well. \par \par Advised on all records to be transferred to our office from all consultants and previous PCP. \par

## 2019-01-16 NOTE — PLAN
[FreeTextEntry1] : 1.Pancreatic mass-he declines any imaging including CT Abd/Pelvis or MRCP, referral to GI at this time. Would like to have his records transferred here before any further work up done. No symptoms as this time. \par \par 2. DM-2: last hba1c at 7.3 in 8/18. [facility] He will get these records to our office. \par Counseled extensively on diabetic diet compliance. Will continue current medication regimen. \par \par 3. HTN: well controlled on current medications. \par 4. HLD: will get lab records, continue current therapy. \par 5. CAD,CABG, Atrial fibrillation: f/u with cardiology, continue current medications. \par 6. Emphysema: continue current medication and f/u with pulmonary\par 7. Lung ca s/p RUL lobectomy: continue follow up with [facility], will get records. \par 8. Prostate Ca: s/p RTX. Notes he did not have prostatectomy. Recent PSA done at [facility], will see them next week to follow up with urology as well. \par \par Advised on all records to be transferred to our office from all consultants and previous PCP. \par

## 2019-01-18 LAB
HBA1C MFR BLD HPLC: 7.1
INR PPP: 2.1
PT BLD: 22.1

## 2019-01-24 ENCOUNTER — MESSAGE (OUTPATIENT)
Age: 79
End: 2019-01-24

## 2019-03-13 ENCOUNTER — APPOINTMENT (OUTPATIENT)
Dept: INTERNAL MEDICINE | Facility: CLINIC | Age: 79
End: 2019-03-13

## 2019-06-04 NOTE — DIETITIAN INITIAL EVALUATION ADULT. - 23 CAL
Blood pressure 124/74, pulse 72, temperature 98 °F (36.7 °C), resp. rate 16, weight 53.8 kg, SpO2 97 %.    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 Weight is stable and she wheels herself around the facility in her wheelchair. There is no cervical adenopathy nor thyromegaly. Chest is clear to auscultation posteriorly. Rhythm is regular with occasional extrasystole. I hear no cardiac murmur. She has no significant pedal edema.    Next visit: Eight weeks or as needed.   [id number]
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
## 2019-07-17 ENCOUNTER — APPOINTMENT (OUTPATIENT)
Dept: INTERNAL MEDICINE | Facility: CLINIC | Age: 79
End: 2019-07-17

## 2019-11-12 NOTE — ED ADULT NURSE NOTE - ED STAT RN HANDOFF DETAILS
Hi Doctor,    This patient is in need of an [facility] referral to be approved for their sleep study. The patient is currently scheduled for this weekend. Please fax the [facility]STEN referral to [phone]. The CHRISTEN should be for two visits- CPT CODE 89763 and 83849.     Thank you,  Sleep Center Staff   From MARCIE RECIO

## 2019-12-10 NOTE — PATIENT PROFILE ADULT. - FALL HARM RISK
23 YO presents with multiple episodes of vomiting and lower abd discomfort, pt denies any chest pain, SOB or any other symptoms. CT Chest/Abd/pel show acute LT pyelonephritis and extensive pneumomediastinum. barium swallow performed shows no leak. WBC 14, downtrending, afebrile.   Pt seen and examined, abd soft, NT, ND    PLAN:  F/U AM CXR  Diet: Clears as tolerated, Dr. Elliott to decide today if Diet will be advanced  Consider urology consult for hydronephrosis/pyelo other/syncope

## 2020-05-18 ENCOUNTER — APPOINTMENT (OUTPATIENT)
Dept: NUCLEAR MEDICINE | Facility: CLINIC | Age: 80
End: 2020-05-18
Payer: COMMERCIAL

## 2020-05-18 ENCOUNTER — OUTPATIENT (OUTPATIENT)
Dept: OUTPATIENT SERVICES | Facility: HOSPITAL | Age: 80
LOS: 1 days | End: 2020-05-18
Payer: OTHER GOVERNMENT

## 2020-05-18 DIAGNOSIS — Z00.8 ENCOUNTER FOR OTHER GENERAL EXAMINATION: ICD-10-CM

## 2020-05-18 DIAGNOSIS — Z98.890 OTHER SPECIFIED POSTPROCEDURAL STATES: Chronic | ICD-10-CM

## 2020-05-18 DIAGNOSIS — Z98.89 OTHER SPECIFIED POSTPROCEDURAL STATES: Chronic | ICD-10-CM

## 2020-05-18 DIAGNOSIS — Z98.1 ARTHRODESIS STATUS: Chronic | ICD-10-CM

## 2020-05-18 DIAGNOSIS — Z95.1 PRESENCE OF AORTOCORONARY BYPASS GRAFT: Chronic | ICD-10-CM

## 2020-05-18 DIAGNOSIS — Z95.5 PRESENCE OF CORONARY ANGIOPLASTY IMPLANT AND GRAFT: Chronic | ICD-10-CM

## 2020-05-18 PROCEDURE — A9552: CPT

## 2020-05-18 PROCEDURE — 78815 PET IMAGE W/CT SKULL-THIGH: CPT | Mod: 26,PI

## 2020-05-18 PROCEDURE — 78815 PET IMAGE W/CT SKULL-THIGH: CPT

## 2020-09-14 NOTE — DISCHARGE NOTE ADULT - %
Called patient and instructed should not be a problem with cardiac meds and she can double check with pharmacy   40

## 2021-03-13 ENCOUNTER — INPATIENT (INPATIENT)
Facility: HOSPITAL | Age: 81
LOS: 1 days | Discharge: NURSING, REHAB OR LONG-TERM CARE | DRG: 312 | End: 2021-03-15
Attending: FAMILY MEDICINE | Admitting: FAMILY MEDICINE
Payer: MEDICARE

## 2021-03-13 VITALS
SYSTOLIC BLOOD PRESSURE: 141 MMHG | RESPIRATION RATE: 18 BRPM | HEART RATE: 59 BPM | OXYGEN SATURATION: 100 % | DIASTOLIC BLOOD PRESSURE: 66 MMHG | WEIGHT: 169.98 LBS

## 2021-03-13 DIAGNOSIS — Z98.89 OTHER SPECIFIED POSTPROCEDURAL STATES: Chronic | ICD-10-CM

## 2021-03-13 DIAGNOSIS — Z95.5 PRESENCE OF CORONARY ANGIOPLASTY IMPLANT AND GRAFT: Chronic | ICD-10-CM

## 2021-03-13 DIAGNOSIS — R55 SYNCOPE AND COLLAPSE: ICD-10-CM

## 2021-03-13 DIAGNOSIS — Z98.1 ARTHRODESIS STATUS: Chronic | ICD-10-CM

## 2021-03-13 DIAGNOSIS — I50.22 CHRONIC SYSTOLIC (CONGESTIVE) HEART FAILURE: ICD-10-CM

## 2021-03-13 DIAGNOSIS — Z98.890 OTHER SPECIFIED POSTPROCEDURAL STATES: Chronic | ICD-10-CM

## 2021-03-13 DIAGNOSIS — Z95.1 PRESENCE OF AORTOCORONARY BYPASS GRAFT: Chronic | ICD-10-CM

## 2021-03-13 LAB
ALBUMIN SERPL ELPH-MCNC: 3.7 G/DL — SIGNIFICANT CHANGE UP (ref 3.3–5)
ALP SERPL-CCNC: 84 U/L — SIGNIFICANT CHANGE UP (ref 40–120)
ALT FLD-CCNC: 28 U/L — SIGNIFICANT CHANGE UP (ref 12–78)
ANION GAP SERPL CALC-SCNC: 10 MMOL/L — SIGNIFICANT CHANGE UP (ref 5–17)
APPEARANCE UR: CLEAR — SIGNIFICANT CHANGE UP
AST SERPL-CCNC: 22 U/L — SIGNIFICANT CHANGE UP (ref 15–37)
BASOPHILS # BLD AUTO: 0.01 K/UL — SIGNIFICANT CHANGE UP (ref 0–0.2)
BASOPHILS NFR BLD AUTO: 0.1 % — SIGNIFICANT CHANGE UP (ref 0–2)
BILIRUB SERPL-MCNC: 0.7 MG/DL — SIGNIFICANT CHANGE UP (ref 0.2–1.2)
BILIRUB UR-MCNC: NEGATIVE — SIGNIFICANT CHANGE UP
BUN SERPL-MCNC: 40 MG/DL — HIGH (ref 7–23)
CALCIUM SERPL-MCNC: 10.2 MG/DL — HIGH (ref 8.5–10.1)
CHLORIDE SERPL-SCNC: 100 MMOL/L — SIGNIFICANT CHANGE UP (ref 96–108)
CO2 SERPL-SCNC: 26 MMOL/L — SIGNIFICANT CHANGE UP (ref 22–31)
COLOR SPEC: YELLOW — SIGNIFICANT CHANGE UP
CREAT SERPL-MCNC: 1.43 MG/DL — HIGH (ref 0.5–1.3)
DIFF PNL FLD: NEGATIVE — SIGNIFICANT CHANGE UP
EOSINOPHIL # BLD AUTO: 0.07 K/UL — SIGNIFICANT CHANGE UP (ref 0–0.5)
EOSINOPHIL NFR BLD AUTO: 1 % — SIGNIFICANT CHANGE UP (ref 0–6)
GLUCOSE SERPL-MCNC: 196 MG/DL — HIGH (ref 70–99)
GLUCOSE UR QL: NEGATIVE MG/DL — SIGNIFICANT CHANGE UP
HCT VFR BLD CALC: 43.4 % — SIGNIFICANT CHANGE UP (ref 39–50)
HGB BLD-MCNC: 14.8 G/DL — SIGNIFICANT CHANGE UP (ref 13–17)
IMM GRANULOCYTES NFR BLD AUTO: 0.4 % — SIGNIFICANT CHANGE UP (ref 0–1.5)
KETONES UR-MCNC: NEGATIVE — SIGNIFICANT CHANGE UP
LEUKOCYTE ESTERASE UR-ACNC: ABNORMAL
LYMPHOCYTES # BLD AUTO: 1.28 K/UL — SIGNIFICANT CHANGE UP (ref 1–3.3)
LYMPHOCYTES # BLD AUTO: 17.8 % — SIGNIFICANT CHANGE UP (ref 13–44)
MAGNESIUM SERPL-MCNC: 1.8 MG/DL — SIGNIFICANT CHANGE UP (ref 1.6–2.6)
MCHC RBC-ENTMCNC: 32.7 PG — SIGNIFICANT CHANGE UP (ref 27–34)
MCHC RBC-ENTMCNC: 34.1 GM/DL — SIGNIFICANT CHANGE UP (ref 32–36)
MCV RBC AUTO: 95.8 FL — SIGNIFICANT CHANGE UP (ref 80–100)
MONOCYTES # BLD AUTO: 0.81 K/UL — SIGNIFICANT CHANGE UP (ref 0–0.9)
MONOCYTES NFR BLD AUTO: 11.3 % — SIGNIFICANT CHANGE UP (ref 2–14)
NEUTROPHILS # BLD AUTO: 4.99 K/UL — SIGNIFICANT CHANGE UP (ref 1.8–7.4)
NEUTROPHILS NFR BLD AUTO: 69.4 % — SIGNIFICANT CHANGE UP (ref 43–77)
NITRITE UR-MCNC: NEGATIVE — SIGNIFICANT CHANGE UP
PH UR: 7 — SIGNIFICANT CHANGE UP (ref 5–8)
PLATELET # BLD AUTO: 164 K/UL — SIGNIFICANT CHANGE UP (ref 150–400)
POTASSIUM SERPL-MCNC: 4.5 MMOL/L — SIGNIFICANT CHANGE UP (ref 3.5–5.3)
POTASSIUM SERPL-SCNC: 4.5 MMOL/L — SIGNIFICANT CHANGE UP (ref 3.5–5.3)
PROT SERPL-MCNC: 7.4 GM/DL — SIGNIFICANT CHANGE UP (ref 6–8.3)
PROT UR-MCNC: 30 MG/DL
RBC # BLD: 4.53 M/UL — SIGNIFICANT CHANGE UP (ref 4.2–5.8)
RBC # FLD: 14.8 % — HIGH (ref 10.3–14.5)
SARS-COV-2 IGG SERPL QL IA: NEGATIVE — SIGNIFICANT CHANGE UP
SARS-COV-2 IGM SERPL IA-ACNC: 0.09 INDEX — SIGNIFICANT CHANGE UP
SARS-COV-2 RNA SPEC QL NAA+PROBE: SIGNIFICANT CHANGE UP
SODIUM SERPL-SCNC: 136 MMOL/L — SIGNIFICANT CHANGE UP (ref 135–145)
SP GR SPEC: 1.01 — SIGNIFICANT CHANGE UP (ref 1.01–1.02)
TROPONIN I SERPL-MCNC: <0.015 NG/ML — SIGNIFICANT CHANGE UP (ref 0.01–0.04)
UROBILINOGEN FLD QL: NEGATIVE MG/DL — SIGNIFICANT CHANGE UP
WBC # BLD: 7.19 K/UL — SIGNIFICANT CHANGE UP (ref 3.8–10.5)
WBC # FLD AUTO: 7.19 K/UL — SIGNIFICANT CHANGE UP (ref 3.8–10.5)

## 2021-03-13 PROCEDURE — 73110 X-RAY EXAM OF WRIST: CPT | Mod: 26,RT

## 2021-03-13 PROCEDURE — 73060 X-RAY EXAM OF HUMERUS: CPT | Mod: 26,RT

## 2021-03-13 PROCEDURE — 86769 SARS-COV-2 COVID-19 ANTIBODY: CPT

## 2021-03-13 PROCEDURE — 36415 COLL VENOUS BLD VENIPUNCTURE: CPT

## 2021-03-13 PROCEDURE — 97162 PT EVAL MOD COMPLEX 30 MIN: CPT | Mod: GP

## 2021-03-13 PROCEDURE — 93010 ELECTROCARDIOGRAM REPORT: CPT

## 2021-03-13 PROCEDURE — 83036 HEMOGLOBIN GLYCOSYLATED A1C: CPT

## 2021-03-13 PROCEDURE — 71260 CT THORAX DX C+: CPT

## 2021-03-13 PROCEDURE — 72125 CT NECK SPINE W/O DYE: CPT | Mod: 26

## 2021-03-13 PROCEDURE — 73502 X-RAY EXAM HIP UNI 2-3 VIEWS: CPT | Mod: 26,RT

## 2021-03-13 PROCEDURE — 73030 X-RAY EXAM OF SHOULDER: CPT | Mod: 26,RT

## 2021-03-13 PROCEDURE — 72192 CT PELVIS W/O DYE: CPT | Mod: 26

## 2021-03-13 PROCEDURE — 99223 1ST HOSP IP/OBS HIGH 75: CPT

## 2021-03-13 PROCEDURE — 97116 GAIT TRAINING THERAPY: CPT | Mod: GP

## 2021-03-13 PROCEDURE — 82962 GLUCOSE BLOOD TEST: CPT

## 2021-03-13 PROCEDURE — 70450 CT HEAD/BRAIN W/O DYE: CPT | Mod: 26

## 2021-03-13 PROCEDURE — 73080 X-RAY EXAM OF ELBOW: CPT | Mod: 26,RT

## 2021-03-13 PROCEDURE — 74177 CT ABD & PELVIS W/CONTRAST: CPT

## 2021-03-13 PROCEDURE — 71045 X-RAY EXAM CHEST 1 VIEW: CPT | Mod: 26

## 2021-03-13 PROCEDURE — 93005 ELECTROCARDIOGRAM TRACING: CPT

## 2021-03-13 PROCEDURE — 83880 ASSAY OF NATRIURETIC PEPTIDE: CPT

## 2021-03-13 PROCEDURE — 85025 COMPLETE CBC W/AUTO DIFF WBC: CPT

## 2021-03-13 PROCEDURE — 84484 ASSAY OF TROPONIN QUANT: CPT

## 2021-03-13 PROCEDURE — 80048 BASIC METABOLIC PNL TOTAL CA: CPT

## 2021-03-13 PROCEDURE — 93306 TTE W/DOPPLER COMPLETE: CPT

## 2021-03-13 PROCEDURE — 76376 3D RENDER W/INTRP POSTPROCES: CPT | Mod: 26

## 2021-03-13 PROCEDURE — 85027 COMPLETE CBC AUTOMATED: CPT

## 2021-03-13 RX ORDER — SPIRONOLACTONE 25 MG/1
25 TABLET, FILM COATED ORAL DAILY
Refills: 0 | Status: DISCONTINUED | OUTPATIENT
Start: 2021-03-13 | End: 2021-03-15

## 2021-03-13 RX ORDER — SODIUM CHLORIDE 9 MG/ML
1000 INJECTION INTRAMUSCULAR; INTRAVENOUS; SUBCUTANEOUS
Refills: 0 | Status: DISCONTINUED | OUTPATIENT
Start: 2021-03-13 | End: 2021-03-13

## 2021-03-13 RX ORDER — METOPROLOL TARTRATE 50 MG
12.5 TABLET ORAL
Refills: 0 | Status: DISCONTINUED | OUTPATIENT
Start: 2021-03-13 | End: 2021-03-15

## 2021-03-13 RX ORDER — SODIUM CHLORIDE 9 MG/ML
1000 INJECTION INTRAMUSCULAR; INTRAVENOUS; SUBCUTANEOUS
Refills: 0 | Status: DISCONTINUED | OUTPATIENT
Start: 2021-03-13 | End: 2021-03-15

## 2021-03-13 RX ORDER — ASPIRIN/CALCIUM CARB/MAGNESIUM 324 MG
81 TABLET ORAL DAILY
Refills: 0 | Status: DISCONTINUED | OUTPATIENT
Start: 2021-03-13 | End: 2021-03-15

## 2021-03-13 RX ORDER — ACETAMINOPHEN 500 MG
650 TABLET ORAL EVERY 6 HOURS
Refills: 0 | Status: DISCONTINUED | OUTPATIENT
Start: 2021-03-13 | End: 2021-03-15

## 2021-03-13 RX ORDER — DEXTROSE 50 % IN WATER 50 %
15 SYRINGE (ML) INTRAVENOUS ONCE
Refills: 0 | Status: DISCONTINUED | OUTPATIENT
Start: 2021-03-13 | End: 2021-03-15

## 2021-03-13 RX ORDER — PANTOPRAZOLE SODIUM 20 MG/1
40 TABLET, DELAYED RELEASE ORAL
Refills: 0 | Status: DISCONTINUED | OUTPATIENT
Start: 2021-03-13 | End: 2021-03-15

## 2021-03-13 RX ORDER — ESCITALOPRAM OXALATE 10 MG/1
5 TABLET, FILM COATED ORAL DAILY
Refills: 0 | Status: DISCONTINUED | OUTPATIENT
Start: 2021-03-13 | End: 2021-03-15

## 2021-03-13 RX ORDER — RIVAROXABAN 15 MG-20MG
15 KIT ORAL
Refills: 0 | Status: DISCONTINUED | OUTPATIENT
Start: 2021-03-13 | End: 2021-03-15

## 2021-03-13 RX ORDER — DEXTROSE 50 % IN WATER 50 %
25 SYRINGE (ML) INTRAVENOUS ONCE
Refills: 0 | Status: DISCONTINUED | OUTPATIENT
Start: 2021-03-13 | End: 2021-03-15

## 2021-03-13 RX ORDER — FUROSEMIDE 40 MG
20 TABLET ORAL DAILY
Refills: 0 | Status: DISCONTINUED | OUTPATIENT
Start: 2021-03-13 | End: 2021-03-14

## 2021-03-13 RX ORDER — TAMSULOSIN HYDROCHLORIDE 0.4 MG/1
0.4 CAPSULE ORAL AT BEDTIME
Refills: 0 | Status: DISCONTINUED | OUTPATIENT
Start: 2021-03-13 | End: 2021-03-15

## 2021-03-13 RX ORDER — SODIUM CHLORIDE 9 MG/ML
1000 INJECTION, SOLUTION INTRAVENOUS
Refills: 0 | Status: DISCONTINUED | OUTPATIENT
Start: 2021-03-13 | End: 2021-03-15

## 2021-03-13 RX ORDER — INSULIN LISPRO 100/ML
VIAL (ML) SUBCUTANEOUS
Refills: 0 | Status: DISCONTINUED | OUTPATIENT
Start: 2021-03-13 | End: 2021-03-15

## 2021-03-13 RX ORDER — TETANUS TOXOID, REDUCED DIPHTHERIA TOXOID AND ACELLULAR PERTUSSIS VACCINE, ADSORBED 5; 2.5; 8; 8; 2.5 [IU]/.5ML; [IU]/.5ML; UG/.5ML; UG/.5ML; UG/.5ML
0.5 SUSPENSION INTRAMUSCULAR ONCE
Refills: 0 | Status: COMPLETED | OUTPATIENT
Start: 2021-03-13 | End: 2021-03-13

## 2021-03-13 RX ORDER — TRAZODONE HCL 50 MG
50 TABLET ORAL AT BEDTIME
Refills: 0 | Status: DISCONTINUED | OUTPATIENT
Start: 2021-03-13 | End: 2021-03-15

## 2021-03-13 RX ORDER — ACETAMINOPHEN 500 MG
650 TABLET ORAL ONCE
Refills: 0 | Status: COMPLETED | OUTPATIENT
Start: 2021-03-13 | End: 2021-03-13

## 2021-03-13 RX ORDER — GLUCAGON INJECTION, SOLUTION 0.5 MG/.1ML
1 INJECTION, SOLUTION SUBCUTANEOUS ONCE
Refills: 0 | Status: DISCONTINUED | OUTPATIENT
Start: 2021-03-13 | End: 2021-03-15

## 2021-03-13 RX ADMIN — RIVAROXABAN 15 MILLIGRAM(S): KIT at 18:27

## 2021-03-13 RX ADMIN — SODIUM CHLORIDE 55 MILLILITER(S): 9 INJECTION INTRAMUSCULAR; INTRAVENOUS; SUBCUTANEOUS at 18:29

## 2021-03-13 RX ADMIN — Medication 12.5 MILLIGRAM(S): at 21:11

## 2021-03-13 RX ADMIN — Medication 650 MILLIGRAM(S): at 21:17

## 2021-03-13 RX ADMIN — TETANUS TOXOID, REDUCED DIPHTHERIA TOXOID AND ACELLULAR PERTUSSIS VACCINE, ADSORBED 0.5 MILLILITER(S): 5; 2.5; 8; 8; 2.5 SUSPENSION INTRAMUSCULAR at 06:47

## 2021-03-13 RX ADMIN — TAMSULOSIN HYDROCHLORIDE 0.4 MILLIGRAM(S): 0.4 CAPSULE ORAL at 21:11

## 2021-03-13 RX ADMIN — Medication 650 MILLIGRAM(S): at 12:30

## 2021-03-13 RX ADMIN — Medication 20 MILLIGRAM(S): at 18:27

## 2021-03-13 RX ADMIN — ESCITALOPRAM OXALATE 5 MILLIGRAM(S): 10 TABLET, FILM COATED ORAL at 18:27

## 2021-03-13 RX ADMIN — Medication 81 MILLIGRAM(S): at 18:27

## 2021-03-13 RX ADMIN — PANTOPRAZOLE SODIUM 40 MILLIGRAM(S): 20 TABLET, DELAYED RELEASE ORAL at 18:27

## 2021-03-13 RX ADMIN — Medication 4: at 18:27

## 2021-03-13 RX ADMIN — Medication 650 MILLIGRAM(S): at 21:50

## 2021-03-13 RX ADMIN — Medication 50 MILLIGRAM(S): at 21:11

## 2021-03-13 RX ADMIN — SPIRONOLACTONE 25 MILLIGRAM(S): 25 TABLET, FILM COATED ORAL at 18:27

## 2021-03-13 RX ADMIN — Medication 650 MILLIGRAM(S): at 11:23

## 2021-03-13 NOTE — H&P ADULT - HISTORY OF PRESENT ILLNESS
79 y/o male with PMH of CAD s/p CABGx4 and stentsx3, CHF EF 40% 2017,  HTN, HLD, DM2, prostate CA, h/o lung malignancy s/p radiation, h/o RUL  partial lobectomy for recurrent PNA, Afib on xarelto, COPD BIBEMS from [facility] for fall.  Pt states  he was in the bathroom, bent over fell and hit his head.  Pt was eventually able to crawl to the bed and lift himself to sitting prior to calling EMS.  Pt denies CP but has SOB which is his baseline. no preceding chest pain, sob, palpitations, lightheadedness, or blurry vision.      In ED,c/o neck and right shoulder pain. cth-no fx, ct pelvis-no fx, c-spine-no fx, cta chest-no PE, chronic lung changes.  ecg-afib, tropx1-neg

## 2021-03-13 NOTE — H&P ADULT - NSHPPHYSICALEXAM_GEN_ALL_CORE
PHYSICAL EXAM:    General: NAD, Alert & Oriented X3.  HEENT: NC/AT, Normal Hearing, dry mucous membranes  Eyes: EOMI, PERRLA, Conjunctiva and sclera clear  Neck: Soft and supple. No JVD  Respiratory: decreased BS bilaterally.  No wheezing, rales or rhonchi  Cardiovascular: S1 and S2, reg rate, irregular rhythm.  No murmurs, gallops or rubs  Gastrointestinal: Soft, non-tender, non-distended. No guarding, rebound tenderness, +BS  Genitourinary: Voiding freely. No palpable bladder  Extremities: +2 peripheral pulses bilaterally.  No clubbing, cyanosis, or edema.    Neurological: CN II-XII intact.  No focal deficits  Musculoskeletal: ROM limited due to pain in right shoulder.  no swelling, redness.  5/5 strength in all other extremities.    Skin: scalp laceration with staples intact.

## 2021-03-13 NOTE — ED PROVIDER NOTE - OBJECTIVE STATEMENT
79 yo male with PMH of CAD s/p CABGx4 and stentsx3, CHF EF 40% 2017,  HTN, HLD, DM2, prostate CA, h/o lung malignancy s/p radiation, h/o RUL  partial lobectomy for recurrent PNA, A. adrienne on xarelto, COPD 79 yo male with PMH of CAD s/p CABGx4 and stentsx3, CHF EF 40% 2017,  HTN, HLD, DM2, prostate CA, h/o lung malignancy s/p radiation, h/o RUL  partial lobectomy for recurrent PNA, A. fib on xarelto, COPD BIBEMS for fall at home.  Pt states he was in his room after going to the bathroom and doesn't remember how he ended up on the floor.  He notes headache and right arm pain.  Pt was eventually able to crawl to the bed and lift himself to sitting prior to calling EMS.  Pt denies CP but has SOB which is his baseline.

## 2021-03-13 NOTE — ED ADULT NURSE NOTE - OBJECTIVE STATEMENT
Patient presents to the ED sp fall into the bathroom sink. Pt does not recall the events leading up to the fall; pt states he cannot recall if it was a mechanical fall or a medical event related to the fall. Pt with a loss of consciousness for an unknown duration of time. Upon exam, patient is awake and alert, able to answer all questions pertaining to the exam. Pt with +2mm pupils, equal round and reactive to light and accomodation,. Pt with laceration to the frontal portion of his forehead at his hair line, bleeding controlled without any dressing. No bleeding from nostrils or ears noted. Pt breathing is even and unlabored, breath sounds auscultated and clear bilaterally. Pt denies CP/SOB. Abdomen is soft and nontender, pt denies N/V/D at this time. Pt skin color consistent with ethnicity, no other trauma noted. Pt co right shoulder, right arm, right elbow, right wrist pain. Pulses present equal and +2 in upper extremities. Pt able to move upper extremities, however does so with pain. Pt able to move lower extremities without issue.

## 2021-03-13 NOTE — ED ADULT NURSE REASSESSMENT NOTE - NS ED NURSE REASSESS COMMENT FT1
received report from kaykay bryan, pt resting comfortably, vitals stable, awaiting remaining imaging results, aware of plan of care, to be admitted

## 2021-03-13 NOTE — ED ADULT TRIAGE NOTE - CHIEF COMPLAINT QUOTE
Pt BIBA from [facility] s/p fall.  Unknown LOC, + small lac to upper forehead.  Pt with c/o of right arm pain.  Refusing to answer more questions.  On baby aspirin.  MD Sanders made aware.  Pt A&Ox4 at triage.  Pt rude stating "this place sucks" "over my dead body will you take my temperature."

## 2021-03-13 NOTE — ED PROVIDER NOTE - CLINICAL SUMMARY MEDICAL DECISION MAKING FREE TEXT BOX
Pt with multiple medical problems p/w head injury s/p syncope.  Plan: CT head to r/o ICH, EKG, labs to include troponin, admit

## 2021-03-13 NOTE — ED PROVIDER NOTE - PROGRESS NOTE DETAILS
pt signed out to me pending labs and admit . trop ct negative. pt endorsed to Dr. White for tele obs. Stevan Bernard M.D., Attending Physician

## 2021-03-13 NOTE — ED ADULT NURSE REASSESSMENT NOTE - NS ED NURSE REASSESS COMMENT FT1
pt reporting right shoulder pain from fall, to be medicated, vitals stable, pt ate breakfast, awaiting covid result to go to unit

## 2021-03-13 NOTE — ED ADULT NURSE NOTE - CHIEF COMPLAINT QUOTE
Pt BIBA from [facility] s/p fall.  Unknown LOC, + small lac to upper forehead.  Pt with c/o of right arm pain.  Refusing to answer more questions.  On baby aspirin.  MD Sanders made aware.  Pt A&Ox4 at triage.  Pt rude stating "this place sucks" "over my dead body will you take my temperature."

## 2021-03-13 NOTE — H&P ADULT - ASSESSMENT
fall, likely mechanical but cannot r/o other etiology given co-morbidities  scalp laceration s/p staples  CAD s/p CABGx4 and stentsx3  CHF EF 40% 2017  HTN  DM2    -admit to tele   -cardio consult   -neurochecks q6h  -f/u am labs   -gentle ivf  -bgm and sliding scale   -daily weight   -strict i/o   -pt evaluation  -dressing changes to wound daily, remove staples in 5-7 days     renal insufficiency unknown baseline   -monitor after ivf, caution as h/o CHF, lung ca    3.0 x 2.0 cm soft tissue density within the   head of the pancreas with mild adjacent fat stranding  -needs outpatient f/u     dvt prophylaxis   -cont xarelto

## 2021-03-14 DIAGNOSIS — J43.9 EMPHYSEMA, UNSPECIFIED: ICD-10-CM

## 2021-03-14 DIAGNOSIS — I10 ESSENTIAL (PRIMARY) HYPERTENSION: ICD-10-CM

## 2021-03-14 DIAGNOSIS — R93.89 ABNORMAL FINDINGS ON DIAGNOSTIC IMAGING OF OTHER SPECIFIED BODY STRUCTURES: ICD-10-CM

## 2021-03-14 DIAGNOSIS — I25.10 ATHEROSCLEROTIC HEART DISEASE OF NATIVE CORONARY ARTERY WITHOUT ANGINA PECTORIS: ICD-10-CM

## 2021-03-14 LAB
A1C WITH ESTIMATED AVERAGE GLUCOSE RESULT: 6.5 % — HIGH (ref 4–5.6)
ANION GAP SERPL CALC-SCNC: 4 MMOL/L — LOW (ref 5–17)
BASOPHILS # BLD AUTO: 0.03 K/UL — SIGNIFICANT CHANGE UP (ref 0–0.2)
BASOPHILS NFR BLD AUTO: 0.5 % — SIGNIFICANT CHANGE UP (ref 0–2)
BUN SERPL-MCNC: 28 MG/DL — HIGH (ref 7–23)
CALCIUM SERPL-MCNC: 9.9 MG/DL — SIGNIFICANT CHANGE UP (ref 8.5–10.1)
CHLORIDE SERPL-SCNC: 102 MMOL/L — SIGNIFICANT CHANGE UP (ref 96–108)
CO2 SERPL-SCNC: 32 MMOL/L — HIGH (ref 22–31)
CREAT SERPL-MCNC: 1.12 MG/DL — SIGNIFICANT CHANGE UP (ref 0.5–1.3)
CULTURE RESULTS: SIGNIFICANT CHANGE UP
EOSINOPHIL # BLD AUTO: 0.2 K/UL — SIGNIFICANT CHANGE UP (ref 0–0.5)
EOSINOPHIL NFR BLD AUTO: 3.1 % — SIGNIFICANT CHANGE UP (ref 0–6)
ESTIMATED AVERAGE GLUCOSE: 140 MG/DL — HIGH (ref 68–114)
GLUCOSE SERPL-MCNC: 96 MG/DL — SIGNIFICANT CHANGE UP (ref 70–99)
HCT VFR BLD CALC: 41.6 % — SIGNIFICANT CHANGE UP (ref 39–50)
HGB BLD-MCNC: 13.9 G/DL — SIGNIFICANT CHANGE UP (ref 13–17)
IMM GRANULOCYTES NFR BLD AUTO: 0.3 % — SIGNIFICANT CHANGE UP (ref 0–1.5)
LYMPHOCYTES # BLD AUTO: 1.13 K/UL — SIGNIFICANT CHANGE UP (ref 1–3.3)
LYMPHOCYTES # BLD AUTO: 17.5 % — SIGNIFICANT CHANGE UP (ref 13–44)
MCHC RBC-ENTMCNC: 32.5 PG — SIGNIFICANT CHANGE UP (ref 27–34)
MCHC RBC-ENTMCNC: 33.4 GM/DL — SIGNIFICANT CHANGE UP (ref 32–36)
MCV RBC AUTO: 97.2 FL — SIGNIFICANT CHANGE UP (ref 80–100)
MONOCYTES # BLD AUTO: 0.86 K/UL — SIGNIFICANT CHANGE UP (ref 0–0.9)
MONOCYTES NFR BLD AUTO: 13.3 % — SIGNIFICANT CHANGE UP (ref 2–14)
NEUTROPHILS # BLD AUTO: 4.23 K/UL — SIGNIFICANT CHANGE UP (ref 1.8–7.4)
NEUTROPHILS NFR BLD AUTO: 65.3 % — SIGNIFICANT CHANGE UP (ref 43–77)
PLATELET # BLD AUTO: 159 K/UL — SIGNIFICANT CHANGE UP (ref 150–400)
POTASSIUM SERPL-MCNC: 3.8 MMOL/L — SIGNIFICANT CHANGE UP (ref 3.5–5.3)
POTASSIUM SERPL-SCNC: 3.8 MMOL/L — SIGNIFICANT CHANGE UP (ref 3.5–5.3)
RBC # BLD: 4.28 M/UL — SIGNIFICANT CHANGE UP (ref 4.2–5.8)
RBC # FLD: 14.9 % — HIGH (ref 10.3–14.5)
SODIUM SERPL-SCNC: 138 MMOL/L — SIGNIFICANT CHANGE UP (ref 135–145)
SPECIMEN SOURCE: SIGNIFICANT CHANGE UP
WBC # BLD: 6.47 K/UL — SIGNIFICANT CHANGE UP (ref 3.8–10.5)
WBC # FLD AUTO: 6.47 K/UL — SIGNIFICANT CHANGE UP (ref 3.8–10.5)

## 2021-03-14 PROCEDURE — 99233 SBSQ HOSP IP/OBS HIGH 50: CPT

## 2021-03-14 PROCEDURE — 93010 ELECTROCARDIOGRAM REPORT: CPT

## 2021-03-14 PROCEDURE — 99255 IP/OBS CONSLTJ NEW/EST HI 80: CPT

## 2021-03-14 RX ORDER — LANOLIN ALCOHOL/MO/W.PET/CERES
3 CREAM (GRAM) TOPICAL ONCE
Refills: 0 | Status: COMPLETED | OUTPATIENT
Start: 2021-03-14 | End: 2021-03-14

## 2021-03-14 RX ADMIN — PANTOPRAZOLE SODIUM 40 MILLIGRAM(S): 20 TABLET, DELAYED RELEASE ORAL at 05:38

## 2021-03-14 RX ADMIN — RIVAROXABAN 15 MILLIGRAM(S): KIT at 18:07

## 2021-03-14 RX ADMIN — Medication 3 MILLIGRAM(S): at 00:44

## 2021-03-14 RX ADMIN — Medication 12.5 MILLIGRAM(S): at 13:25

## 2021-03-14 RX ADMIN — Medication 2: at 13:21

## 2021-03-14 RX ADMIN — Medication 50 MILLIGRAM(S): at 21:08

## 2021-03-14 RX ADMIN — Medication 81 MILLIGRAM(S): at 09:43

## 2021-03-14 RX ADMIN — Medication 2: at 18:07

## 2021-03-14 RX ADMIN — Medication 2: at 08:44

## 2021-03-14 RX ADMIN — Medication 650 MILLIGRAM(S): at 21:08

## 2021-03-14 RX ADMIN — ESCITALOPRAM OXALATE 5 MILLIGRAM(S): 10 TABLET, FILM COATED ORAL at 09:43

## 2021-03-14 RX ADMIN — Medication 20 MILLIGRAM(S): at 09:43

## 2021-03-14 RX ADMIN — Medication 12.5 MILLIGRAM(S): at 21:08

## 2021-03-14 RX ADMIN — SPIRONOLACTONE 25 MILLIGRAM(S): 25 TABLET, FILM COATED ORAL at 09:43

## 2021-03-14 RX ADMIN — Medication 650 MILLIGRAM(S): at 23:09

## 2021-03-14 RX ADMIN — TAMSULOSIN HYDROCHLORIDE 0.4 MILLIGRAM(S): 0.4 CAPSULE ORAL at 21:08

## 2021-03-14 NOTE — PHYSICAL THERAPY INITIAL EVALUATION ADULT - DIAGNOSIS, PT EVAL
Fall, likely mechanical but cannot r/o other etiology given co-morbidities, scalp laceration s/p staples,

## 2021-03-14 NOTE — CONSULT NOTE ADULT - SUBJECTIVE AND OBJECTIVE BOX
CHIEF COMPLAINT: Patient is a 80y old  Male who presents with a chief complaint of fall (13 Mar 2021 11:13)      HPI:  77 y/o male with PMH of CAD s/p CABGx4 and stentsx3, CHF EF 40% 2017,  HTN, HLD, DM2, prostate CA, h/o lung malignancy s/p radiation, h/o RUL  partial lobectomy for recurrent PNA, Afib on xarelto, COPD BIBEMS from [facility] for fall.  Pt states  he was in the bathroom, bent over fell and hit his head.  Pt was eventually able to crawl to the bed and lift himself to sitting prior to calling EMS.  Pt denies CP but has SOB which is his baseline. no preceding chest pain, sob, palpitations, lightheadedness, or blurry vision.      In ED,c/o neck and right shoulder pain. cth-no fx, ct pelvis-no fx, c-spine-no fx, cta chest-no PE, chronic lung changes.  ecg-afib, tropx1-neg   (13 Mar 2021 11:13)    3/13-patient not known to me-saw him 3 years ago for syncope/CHF-recommended PPM but patient transferred to [place] to see his private cardiologist-Dr. Landrum.  At that time, he was 77 with PMH of CAD s/p CABG and stents, CHF (unknown EF) HTN, HLD, DM2, prostate CA, h/o lung malignancy s/p radiation, h/o RUL lobectomy for recurrent PNA, A. fib on xarelto brought in by EMS for episode of syncope. Pt does not recall the whole event. As per wife at bedside pt was brushing his teeth and putting in his dentures when she heard a loud noise from the bathroom. Patients son ran into the bathroom and found his father on the floor. He picked him up and carried him to the bed. Prior to episode pt states he felt weak but denies any dizziness, chest pain, palpitations, headaches, SOB, abd pain, N/V. As per wife pt developed some rhinorrhea and cough last night. No fevers or chills. No recent travel, no sick contacts. He was feeling lethargic and has not been eating since last night. Over 4 days, while patient feeling ok, still with significant pauses, PVC's, couplets and NSVT.  PAtient with significant CAD/CHF with ECHO showing global hypokinesis with EF 40% and moderate MR.  PAtient transferred to [place] for further evaluation.  No PPM/ICD or other w/u ever done.   Patient's cardiologist is Dr Landrum.           Now, 3 years later, patient was bending over in bathroom to pick something off the floor and struck his head-flatly denies syncope.   But difficulty getting up so he crawled to his bed(at assisted living) and pulled himself up and climbed into bed.   Was seen by aide and sent to hospital.   [facility] [place] and Dr. landrum is at [facility].  Not c/o of any progressive CAD or arrhythmia issues.  His anabel c/o is some SOB    PMHx: PAST MEDICAL & SURGICAL HISTORY:  Osteoarthritis    Lung cancer    DM (diabetes mellitus)    Cataracts, bilateral  lens implants    Prostate ca    BPH (benign prostatic hyperplasia)  &quot;chemo shrink prostate&quot;    Carotid stenosis, left    CAD (coronary artery disease)  S/P coronary artery stents 2002, 2003    HLD (hyperlipidemia)    HTN (hypertension)    History of laminectomy    H/O spinal fusion  1997    S/P CABG x 3  1997    S/P lobectomy of lung  right upper 1998    S/P TURP    Stented coronary artery  2002, 2003    S/P carotid endarterectomy  left, S/P left carotid stent 2000          Soc Hx:       Allergies: Allergies    morphine (Hypotension)  oxycodone (Other)    Intolerances          REVIEW OF SYSTEMS:    CONSTITUTIONAL: No weakness, fevers or chills  EYES/ENT: No visual changes;  No vertigo or throat pain   NECK: No pain or stiffness  RESPIRATORY: shortness of breath  CARDIOVASCULAR:  palpitations  GASTROINTESTINAL: No abdominal or epigastric pain. No nausea, vomiting, or hematemesis; No diarrhea or constipation. No melena or hematochezia.  GENITOURINARY: No dysuria, frequency or hematuria  NEUROLOGICAL: No numbness or weakness      Vital Signs Last 24 Hrs  T(C): 36.5 (13 Mar 2021 05:37), Max: 36.5 (13 Mar 2021 05:37)  T(F): 97.7 (13 Mar 2021 05:37), Max: 97.7 (13 Mar 2021 05:37)  HR: 68 (13 Mar 2021 11:06) (59 - 68)  BP: 130/65 (13 Mar 2021 11:06) (130/65 - 141/66)  BP(mean): --  RR: 17 (13 Mar 2021 11:06) (17 - 18)  SpO2: 100% (13 Mar 2021 11:06) (100% - 100%)    I&O's Summary      CAPILLARY BLOOD GLUCOSE      POCT Blood Glucose.: 156 mg/dL (13 Mar 2021 08:54)      PHYSICAL EXAM:   Constitutional: NAD, awake and alert, well-developed  HEENT: PERR, EOMI, Normal Hearing, MMM  Neck:JVD  Respiratory: Breath sounds  rhonchi  Cardiovascular: S1 and S2, irregular rate and rhythm, Murmurs,   Gastrointestinal: Bowel Sounds present, soft, nontender, nondistended, no guarding, no rebound  Extremities: No peripheral edema  Vascular: 2+ peripheral pulses      MEDICATIONS:  MEDICATIONS  (STANDING):  aspirin  chewable 81 milliGRAM(s) Oral daily  dextrose 40% Gel 15 Gram(s) Oral once  dextrose 5%. 1000 milliLiter(s) (50 mL/Hr) IV Continuous <Continuous>  dextrose 50% Injectable 25 Gram(s) IV Push once  escitalopram 5 milliGRAM(s) Oral daily  furosemide    Tablet 20 milliGRAM(s) Oral daily  glucagon  Injectable 1 milliGRAM(s) IntraMuscular once  insulin lispro (ADMELOG) corrective regimen sliding scale   SubCutaneous three times a day before meals  metoprolol tartrate 12.5 milliGRAM(s) Oral two times a day  pantoprazole    Tablet 40 milliGRAM(s) Oral before breakfast  rivaroxaban 15 milliGRAM(s) Oral with dinner  sodium chloride 0.9%. 1000 milliLiter(s) (55 mL/Hr) IV Continuous <Continuous>  spironolactone 25 milliGRAM(s) Oral daily  tamsulosin Oral Tab/Cap - Peds 0.4 milliGRAM(s) Oral at bedtime  traZODone 50 milliGRAM(s) Oral at bedtime      LABS: All Labs Reviewed:                        14.8   7.19  )-----------( 164      ( 13 Mar 2021 06:42 )             43.4     03-13    136  |  100  |  40<H>  ----------------------------<  196<H>  4.5   |  26  |  1.43<H>    Ca    10.2<H>      13 Mar 2021 06:42  Mg     1.8     03-13    TPro  7.4  /  Alb  3.7  /  TBili  0.7  /  DBili  x   /  AST  22  /  ALT  28  /  AlkPhos  84  03-13      CARDIAC MARKERS ( 13 Mar 2021 10:18 )  <0.015 ng/mL / x     / x     / x     / x      CARDIAC MARKERS ( 13 Mar 2021 06:42 )  <0.015 ng/mL / x     / x     / x     / x            Blood Culture:   lipid profile       RADIOLOGY:    < from: Xray Chest 1 View-PORTABLE IMMEDIATE (03.13.21 @ 06:50) >  INTERPRETATION:  HISTORY: ; ; fall/syncope;  TECHNIQUE: Portable frontal view of the chest, 2 views.  COMPARISON: 10/18/2017.  FINDINGS/  IMPRESSION:    HEART: Enlarged in this projection. sternotomy wires.  LUNGS: Hyperlucent lungs compatible with COPD. Right lower lung opacity with pleural retraction at the right base, similar to prior study. No pneumothorax.  BONES: degenerative changes    < from: CT Head No Cont (03.13.21 @ 06:08) >  IMPRESSION:    CT BRAIN:    No evidence of acute intracranial hemorrhage, midline shift or CT evidence of acute territorial infarct.  If the patient's symptoms persist, consider short interval follow-up head CT or brain MRI if there are no MRI contraindications.    CT CERVICAL SPINE:    No acute cervical fracture or traumatic malalignment.  MRI would be required to evaluate the ligamentous structures at higher sensitivity as well as for better evaluation of the cervical canal and its contents.    < from: NM PET/CT Onc FDG Skull to Thigh, Inital (05.18.20 @ 16:26) >  EXAM:  PETCT [facility] ONC FDG INIT        PROCEDURE DATE:  05/18/2020           INTERPRETATION:  PROCEDURE:  PET/CT SKULL BASE-MID THIGH IMAGING     RADIOPHARMACEUTICAL:  12.0 mCi F-18, FDG, I.V.    CLINICAL INFORMATION: Abnormal masslike opacity right lower lobe seen on outside CT. PET/CT is done as part of the initial treatment strategy evaluation.    TECHNIQUE:  Fasting blood sugar measured prior to injection of radiopharmaceutical was 134 mg/dl. Following intravenous injection of the radiopharmaceutical and an uptake period of approximately 50 minutes, FDG-PET/CT was obtained on a Siemens Biograph mCT 64  scanner from the skull base to mid thigh. Oral contrast  was administered during the uptake period. CT was performed during shallow respiration. The CT protocol was optimized for PET attenuation correction and to provide anatomic detail for localization of PET abnormalities. The CT protocol was not designed to produce and cannot replace state-of-the-art diagnostic CT images with specific imaging protocols for different body parts and indications. Images were reviewed on a dedicated workstation using multiplanar reconstruction.    The standardized uptake values (SUV) are normalized to patient body weight and indicate the highestactivity concentration (SUVmax) in a given disease site. All image numbers refer to axial image number.    COMPARISON:  Report only of CT chest from [facility] May 12, 2020    OTHER STUDIES USED FOR CORRELATION: CT angiogram chest August 8, 2018, CT chest October 18, 2017    FINDINGS:    HEAD/NECK: No abnormal avidity. Physiologic FDG activity seen in the visualized portions of the brain, major salivary glands and neck muscles.  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 No abnormal avidity.Physiologic FDG activity in the myocardium and blood pool.    LUNGS: Stable postsurgical changes of right upper lobe wedge resection, without focal FDG avidity along the suture lines. Pleural-based masslike consolidation approximately 6.2 x 2.5 cm with homogeneous FDG avidity SUV 3.8; image 120), unchanged in size since CT chest August 28, 2018 where it measured 6.6 x 2.4 cm and 6.5 x 2.8 cm on CT chest October 18, 2017. Emphysema.   PLEURA/PERICARDIUM: Small loculated right posterior medial pneumothorax (image 113). No abnormal avidity. No pericardial effusion. Minimal unchanged pleural thickening.   HEPATOBILIARY/PANCREAS:  Liver background SUV mean as a reference for comparing studies is 3.2. Cholelithiasis. Atrophic pancreas.  SPLEEN: No abnormal avidity.  ADRENAL GLANDS: No abnormal avidity.  KIDNEYS/URINARY BLADDER: Excreted activity is seen.   No abnormal avidity.  ABDOMINOPELVIC NODES:   No abnormal avidity.  BOWEL/PERITONEUM/MESENTERY:  No abnormal avidity. Sigmoid colon diverticulosis.  PELVIC ORGANS: No abnormal avidity.  BONES:. Unchanged deformity of the right posterior lateral sixth, seventh and eighth ribs, unchanged since 2017. Degenerative changes throughout the spine.  SOFT TISSUES:  No abnormal avidity.  OTHER FINDINGS: Aortobiiliac and branch vessel atherosclerotic calcifications.    IMPRESSION:      1.  Masslike consolidation subpleural right lower lobe with low-grade uniform FDG avidity, unchanged in size dating back to CT chest October 18, 2017. Etiology is indeterminate, however may represent postsurgical sequela. Continued CT chest follow-up suggested.  2.  Unchanged small loculated posterior medial right pneumothorax dating back to 2017.  3.Deformities of the right posterior lateral sixth through eighth ribs, unchanged since 2017.    < end of copied text >      EKG:  atrial fibrillation 73bpm, LAD, RBBB/LAFB    Telemetry:    ECHO:    
  HPI:  79 y/o male with PMH of CAD s/p CABGx4 and stentsx3, CHF EF 40% ,  HTN, HLD, DM2, prostate CA, h/o lung malignancy s/p radiation, h/o RUL  partial lobectomy for recurrent PNA, Afib on xarelto, COPD/emphysema BIBEMS from [facility] for fall.  Pt states  he was in the bathroom, bent over fell and hit his head.  Pt was eventually able to crawl to the bed and lift himself to sitting prior to calling EMS.  Pt denies CP but has SOB which is his baseline. no preceding chest pain, sob, palpitations, lightheadedness, or blurry vision.      In ED,c/o neck and right shoulder pain. cth-no fx, ct pelvis-no fx, c-spine-no fx, cta chest-no PE, chronic lung changes.  ecg-afib, tropx1-neg       (13 Mar 2021 11:13)    3/14: Pt has h.o. R apical wedge resection.  PET scan of 2020 showed stable findings R base c/w 10/2017 CT.  Pt denies SOB, cough, hemoptysis, fever.  Does say has lost weight.  He also has a h.o. previously elevated PSA greater than 100, 2019, [facility], likely metastatic prostate cancer.          Pt awake, in no distress. sat'ing well on RA.       PAST MEDICAL & SURGICAL HISTORY:  Osteoarthritis    Lung cancer    DM (diabetes mellitus)    Cataracts, bilateral  lens implants    Prostate ca    BPH (benign prostatic hyperplasia)  &quot;chemo shrink prostate&quot;    Carotid stenosis, left    CAD (coronary artery disease)  S/P coronary artery stents ,     HLD (hyperlipidemia)    HTN (hypertension)    History of laminectomy    H/O spinal fusion      S/P CABG x 3      S/P lobectomy of lung  right upper     S/P TURP    Stented coronary artery  ,     S/P carotid endarterectomy  left, S/P left carotid stent         MEDICATIONS  (STANDING):  aspirin  chewable 81 milliGRAM(s) Oral daily  dextrose 40% Gel 15 Gram(s) Oral once  dextrose 5%. 1000 milliLiter(s) (50 mL/Hr) IV Continuous <Continuous>  dextrose 50% Injectable 25 Gram(s) IV Push once  escitalopram 5 milliGRAM(s) Oral daily  glucagon  Injectable 1 milliGRAM(s) IntraMuscular once  insulin lispro (ADMELOG) corrective regimen sliding scale   SubCutaneous three times a day before meals  metoprolol tartrate 12.5 milliGRAM(s) Oral two times a day  pantoprazole    Tablet 40 milliGRAM(s) Oral before breakfast  rivaroxaban 15 milliGRAM(s) Oral with dinner  sodium chloride 0.9%. 1000 milliLiter(s) (55 mL/Hr) IV Continuous <Continuous>  spironolactone 25 milliGRAM(s) Oral daily  tamsulosin Oral Tab/Cap - Peds 0.4 milliGRAM(s) Oral at bedtime  traZODone 50 milliGRAM(s) Oral at bedtime    MEDICATIONS  (PRN):  acetaminophen   Tablet .. 650 milliGRAM(s) Oral every 6 hours PRN Mild Pain (1 - 3), Moderate Pain (4 - 6)      Allergies    morphine (Hypotension)  oxycodone (Other)    Intolerances        SOCIAL HISTORY: Denies tobacco, etoh abuse or illicit drug use    FAMILY HISTORY:  No pertinent family history in first degree relatives        Vital Signs Last 24 Hrs  T(C): 36.8 (14 Mar 2021 08:35), Max: 36.8 (13 Mar 2021 18:00)  T(F): 98.3 (14 Mar 2021 08:35), Max: 98.3 (14 Mar 2021 08:35)  HR: 58 (14 Mar 2021 08:35) (58 - 91)  BP: 112/63 (14 Mar 2021 08:35) (112/63 - 151/94)  BP(mean): --  RR: 18 (14 Mar 2021 08:35) (18 - 18)  SpO2: 100% (14 Mar 2021 08:35) (95% - 100%)    REVIEW OF SYSTEMS:    CONSTITUTIONAL:  As per HPI.  HEENT:  Eyes:  No diplopia or blurred vision. ENT:  No earache, sore throat or runny nose.  CARDIOVASCULAR:  No pressure, squeezing, tightness, heaviness or aching about the chest, neck, axilla or epigastrium.  RESPIRATORY:  No cough, shortness of breath, PND or orthopnea.  GASTROINTESTINAL:  No nausea, vomiting or diarrhea.  GENITOURINARY:  No dysuria, frequency or urgency.  MUSCULOSKELETAL:  As per HPI.  SKIN:  No change in skin, hair or nails.  NEUROLOGIC:  No paresthesias, fasciculations, seizures or weakness.  PSYCHIATRIC:  No disorder of thought or mood.  ENDOCRINE:  No heat or cold intolerance, polyuria or polydipsia.  HEMATOLOGICAL:  No easy bruising or bleedings:  .     PHYSICAL EXAMINATION:    GENERAL APPEARANCE:  Pt. is not currently dyspneic, in no distress. Pt. is alert, oriented, and pleasant.  HEENT:  Pupils are normal and react normally. No icterus. Mucous membranes well colored.  NECK:  Supple. No lymphadenopathy. Jugular venous pressure not elevated. Carotids equal.   HEART:   The cardiac impulse has a normal quality.  HR 70.  CHEST:  Few rhonchi bilat.  ABDOMEN:  Soft and nontender.   EXTREMITIES:  There is no cyanosis, clubbing or edema.   SKIN:  No rash or significant lesions are noted.  Neuro: Awake.      LABS:                        13.9   6.47  )-----------( 159      ( 14 Mar 2021 06:49 )             41.6     03-14    138  |  102  |  28<H>  ----------------------------<  96  3.8   |  32<H>  |  1.12    Ca    9.9      14 Mar 2021 06:49  Mg     1.8     03-13    TPro  7.4  /  Alb  3.7  /  TBili  0.7  /  DBili  x   /  AST  22  /  ALT  28  /  AlkPhos  84  03-13    LIVER FUNCTIONS - ( 13 Mar 2021 06:42 )  Alb: 3.7 g/dL / Pro: 7.4 gm/dL / ALK PHOS: 84 U/L / ALT: 28 U/L / AST: 22 U/L / GGT: x             CARDIAC MARKERS ( 13 Mar 2021 14:48 )  <0.015 ng/mL / x     / x     / x     / x      CARDIAC MARKERS ( 13 Mar 2021 13:17 )  <0.015 ng/mL / x     / x     / x     / x      CARDIAC MARKERS ( 13 Mar 2021 10:18 )  <0.015 ng/mL / x     / x     / x     / x      CARDIAC MARKERS ( 13 Mar 2021 06:42 )  <0.015 ng/mL / x     / x     / x     / x          Urinalysis Basic - ( 13 Mar 2021 08:49 )    Color: Yellow / Appearance: Clear / S.010 / pH: x  Gluc: x / Ketone: Negative  / Bili: Negative / Urobili: Negative mg/dL   Blood: x / Protein: 30 mg/dL / Nitrite: Negative   Leuk Esterase: Trace / RBC: 0-2 /HPF / WBC 0-2   Sq Epi: x / Non Sq Epi: Occasional / Bacteria: Occasional          RADIOLOGY & ADDITIONAL STUDIES:       EXAM:  PETCT [facility] ONC FDG INIT        PROCEDURE DATE:  2020           INTERPRETATION:  PROCEDURE:  PET/CT SKULL BASE-MID THIGH IMAGING     RADIOPHARMACEUTICAL:  12.0 mCi F-18, FDG, I.V.    CLINICAL INFORMATION: Abnormal masslike opacity right lower lobe seen on outside CT. PET/CT is done as part of the initial treatment strategy evaluation.    TECHNIQUE:  Fasting blood sugar measured prior to injection of radiopharmaceutical was 134 mg/dl. Following intravenous injection of the radiopharmaceutical and an uptake period of approximately 50 minutes, FDG-PET/CT was obtained on a Siemens Biograph mCT 64  scanner from the skull base to mid thigh. Oral contrast  was administered during the uptake period. CT was performed during shallow respiration. The CT protocol was optimized for PET attenuation correction and to provide anatomic detail for localization of PET abnormalities. The CT protocol was not designed to produce and cannot replace state-of-the-art diagnostic CT images with specific imaging protocols for different body parts and indications. Images were reviewed on a dedicated workstation using multiplanar reconstruction.    The standardized uptake values (SUV) are normalized to patient body weight and indicate the highestactivity concentration (SUVmax) in a given disease site. All image numbers refer to axial image number.    COMPARISON:  Report only of CT chest from [facility] May 12, 2020    OTHER STUDIES USED FOR CORRELATION: CT angiogram chest 2018, CT chest 2017    FINDINGS:      HEAD/NECK: No abnormal avidity. Physiologic FDG activity seen in the visualized portions of the brain, major salivary glands and neck muscles.    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 No abnormal avidity.Physiologic FDG activity in the myocardium and blood pool.      LUNGS: Stable postsurgical changes of right upper lobe wedge resection, without focal FDG avidity along the suture lines. Pleural-based masslike consolidation approximately 6.2 x 2.5 cm with homogeneous FDG avidity SUV 3.8; image 120), unchanged in size since CT chest 2018 where it measured 6.6 x 2.4 cm and 6.5 x 2.8 cm on CT chest 2017. Emphysema.     PLEURA/PERICARDIUM: Small loculated right posterior medial pneumothorax (image 113). No abnormal avidity. No pericardial effusion. Minimal unchanged pleural thickening.     HEPATOBILIARY/PANCREAS:  Liver background SUV mean as a reference for comparing studies is 3.2. Cholelithiasis. Atrophic pancreas.    SPLEEN: No abnormal avidity.    ADRENAL GLANDS: No abnormal avidity.    KIDNEYS/URINARY BLADDER: Excreted activity is seen.   No abnormal avidity.    ABDOMINOPELVIC NODES:   No abnormal avidity.    BOWEL/PERITONEUM/MESENTERY:  No abnormal avidity. Sigmoid colon diverticulosis.    PELVIC ORGANS: No abnormal avidity.    BONES:. Unchanged deformity of the right posterior lateral sixth, seventh and eighth ribs, unchanged since 2017. Degenerative changes throughout the spine.    SOFT TISSUES:  No abnormal avidity.    OTHER FINDINGS: Aortobiiliac and branch vessel atherosclerotic calcifications.    IMPRESSION:      1.  Masslike consolidation subpleural right lower lobe with low-grade uniform FDG avidity, unchanged in size dating back to CT chest 2017. Etiology is indeterminate, however may represent postsurgical sequela. Continued CT chest follow-up suggested.    2.  Unchanged small loculated posterior medial right pneumothorax dating back to 2017.    3.Deformities of the right posterior lateral sixth through eighth ribs, unchanged since 2017.          EXAM:  XR CHEST PORTABLE IMMED 1V                            PROCEDURE DATE:  2021          INTERPRETATION:  HISTORY: ; ; fall/syncope;  TECHNIQUE: Portable frontal view of the chest, 2 views.  COMPARISON: 10/18/2017.  FINDINGS/  IMPRESSION:    HEART: Enlarged in this projection. sternotomy wires.  LUNGS: Hyperlucent lungs compatible with COPD. Right lower lung opacity with pleural retraction at the right base, similar to prior study. No pneumothorax.  BONES: degenerative changes

## 2021-03-14 NOTE — PHYSICAL THERAPY INITIAL EVALUATION ADULT - PERTINENT HX OF CURRENT PROBLEM, REHAB EVAL
Pt was BIBEMS from [facility] for fall.  Pt states  he was in the bathroom, bent over fell and hit his head.  Pt was eventually able to crawl to the bed and lift himself to sitting prior to calling EMS.  Pt denies CP but has SOB which is his baseline.,

## 2021-03-14 NOTE — PHYSICAL THERAPY INITIAL EVALUATION ADULT - GENERAL OBSERVATIONS, REHAB EVAL
pt was found sitting at eob with MARCIE Romano at bedside, pt appears confused but willing to participate in PT, initially c/o dizziness but later felt better

## 2021-03-15 ENCOUNTER — TRANSCRIPTION ENCOUNTER (OUTPATIENT)
Age: 81
End: 2021-03-15

## 2021-03-15 VITALS — OXYGEN SATURATION: 98 % | TEMPERATURE: 98 F

## 2021-03-15 LAB
ANION GAP SERPL CALC-SCNC: 7 MMOL/L — SIGNIFICANT CHANGE UP (ref 5–17)
BUN SERPL-MCNC: 20 MG/DL — SIGNIFICANT CHANGE UP (ref 7–23)
CALCIUM SERPL-MCNC: 9.8 MG/DL — SIGNIFICANT CHANGE UP (ref 8.5–10.1)
CHLORIDE SERPL-SCNC: 104 MMOL/L — SIGNIFICANT CHANGE UP (ref 96–108)
CO2 SERPL-SCNC: 28 MMOL/L — SIGNIFICANT CHANGE UP (ref 22–31)
CREAT SERPL-MCNC: 1.18 MG/DL — SIGNIFICANT CHANGE UP (ref 0.5–1.3)
GLUCOSE SERPL-MCNC: 158 MG/DL — HIGH (ref 70–99)
HCT VFR BLD CALC: 42.8 % — SIGNIFICANT CHANGE UP (ref 39–50)
HGB BLD-MCNC: 14.3 G/DL — SIGNIFICANT CHANGE UP (ref 13–17)
MCHC RBC-ENTMCNC: 32.4 PG — SIGNIFICANT CHANGE UP (ref 27–34)
MCHC RBC-ENTMCNC: 33.4 GM/DL — SIGNIFICANT CHANGE UP (ref 32–36)
MCV RBC AUTO: 96.8 FL — SIGNIFICANT CHANGE UP (ref 80–100)
PLATELET # BLD AUTO: 155 K/UL — SIGNIFICANT CHANGE UP (ref 150–400)
POTASSIUM SERPL-MCNC: 4.6 MMOL/L — SIGNIFICANT CHANGE UP (ref 3.5–5.3)
POTASSIUM SERPL-SCNC: 4.6 MMOL/L — SIGNIFICANT CHANGE UP (ref 3.5–5.3)
RBC # BLD: 4.42 M/UL — SIGNIFICANT CHANGE UP (ref 4.2–5.8)
RBC # FLD: 14.7 % — HIGH (ref 10.3–14.5)
SODIUM SERPL-SCNC: 139 MMOL/L — SIGNIFICANT CHANGE UP (ref 135–145)
WBC # BLD: 6.36 K/UL — SIGNIFICANT CHANGE UP (ref 3.8–10.5)
WBC # FLD AUTO: 6.36 K/UL — SIGNIFICANT CHANGE UP (ref 3.8–10.5)

## 2021-03-15 PROCEDURE — 93306 TTE W/DOPPLER COMPLETE: CPT | Mod: 26

## 2021-03-15 PROCEDURE — 71260 CT THORAX DX C+: CPT | Mod: 26

## 2021-03-15 PROCEDURE — 74177 CT ABD & PELVIS W/CONTRAST: CPT | Mod: 26

## 2021-03-15 PROCEDURE — 99239 HOSP IP/OBS DSCHRG MGMT >30: CPT

## 2021-03-15 PROCEDURE — 99233 SBSQ HOSP IP/OBS HIGH 50: CPT

## 2021-03-15 RX ORDER — SODIUM CHLORIDE 9 MG/ML
500 INJECTION INTRAMUSCULAR; INTRAVENOUS; SUBCUTANEOUS ONCE
Refills: 0 | Status: COMPLETED | OUTPATIENT
Start: 2021-03-15 | End: 2021-03-15

## 2021-03-15 RX ORDER — METFORMIN HYDROCHLORIDE 850 MG/1
1 TABLET ORAL
Qty: 0 | Refills: 0 | DISCHARGE

## 2021-03-15 RX ORDER — FUROSEMIDE 40 MG
1 TABLET ORAL
Qty: 0 | Refills: 0 | DISCHARGE

## 2021-03-15 RX ORDER — RIVAROXABAN 15 MG-20MG
1 KIT ORAL
Qty: 0 | Refills: 0 | DISCHARGE

## 2021-03-15 RX ORDER — DIGOXIN 250 MCG
1 TABLET ORAL
Qty: 0 | Refills: 0 | DISCHARGE

## 2021-03-15 RX ORDER — METOPROLOL TARTRATE 50 MG
1 TABLET ORAL
Qty: 0 | Refills: 0 | DISCHARGE

## 2021-03-15 RX ORDER — ATORVASTATIN CALCIUM 80 MG/1
1 TABLET, FILM COATED ORAL
Qty: 0 | Refills: 0 | DISCHARGE

## 2021-03-15 RX ORDER — ONDANSETRON 8 MG/1
4 TABLET, FILM COATED ORAL ONCE
Refills: 0 | Status: COMPLETED | OUTPATIENT
Start: 2021-03-15 | End: 2021-03-15

## 2021-03-15 RX ADMIN — RIVAROXABAN 15 MILLIGRAM(S): KIT at 17:48

## 2021-03-15 RX ADMIN — ONDANSETRON 4 MILLIGRAM(S): 8 TABLET, FILM COATED ORAL at 06:43

## 2021-03-15 RX ADMIN — ESCITALOPRAM OXALATE 5 MILLIGRAM(S): 10 TABLET, FILM COATED ORAL at 12:34

## 2021-03-15 RX ADMIN — Medication 12.5 MILLIGRAM(S): at 12:34

## 2021-03-15 RX ADMIN — Medication 650 MILLIGRAM(S): at 07:00

## 2021-03-15 RX ADMIN — Medication 4: at 17:48

## 2021-03-15 RX ADMIN — PANTOPRAZOLE SODIUM 40 MILLIGRAM(S): 20 TABLET, DELAYED RELEASE ORAL at 06:15

## 2021-03-15 RX ADMIN — SPIRONOLACTONE 25 MILLIGRAM(S): 25 TABLET, FILM COATED ORAL at 12:34

## 2021-03-15 RX ADMIN — Medication 2: at 08:26

## 2021-03-15 RX ADMIN — SODIUM CHLORIDE 500 MILLILITER(S): 9 INJECTION INTRAMUSCULAR; INTRAVENOUS; SUBCUTANEOUS at 17:49

## 2021-03-15 RX ADMIN — Medication 81 MILLIGRAM(S): at 12:34

## 2021-03-15 NOTE — DISCHARGE NOTE PROVIDER - HOSPITAL COURSE
79 y/o male with PMH of CAD s/p CABGx4 and stentsx3, CHF EF 40% 2017,  HTN, HLD, DM2, prostate CA, h/o lung malignancy s/p radiation, h/o RUL  partial lobectomy for recurrent PNA, Afib on xarelto, COPD BIBEMS from [facility] for fall.  Pt states  he was in the bathroom, bent over fell and hit his head.  Pt was eventually able to crawl to the bed and lift himself to sitting prior to calling EMS.  Pt denies CP but has SOB which is his baseline. no preceding chest pain, sob, palpitations, lightheadedness, or blurry vision.    In ED, c/o neck and right shoulder pain. CT head -no fx, ct pelvis-no fx, c-spine-no fx, CTA  chest-no PE, chronic lung changes.  ECG -afib, tropx1-neg Pts scalp laceration was repaired   Pt was admitted for further  monitoring. On tele no events, AFIB, rate controlled. On imaging CXR noted RLL infiltrate, past CT reviewed with Dr Shepherd, similar infiltrate ion the past, repeat CT recommended for stability , was done, no changes. Will need to further f/u outPt with Pulm. Also has h/o suspected  pancreatic mass, unclear if Pt followed outPt, can not recall. CT with IV contrast done,  no evidence of pancreatic mass.     pt was seen and examined, reports no complains, states that feels tired and wants to go back to facility     Vital Signs Last 24 Hrs  T(C): 36.3 (15 Mar 2021 16:16), Max: 36.8 (14 Mar 2021 21:05)  T(F): 97.3 (15 Mar 2021 16:16), Max: 98.2 (14 Mar 2021 21:05)  HR: 64 (15 Mar 2021 16:16) (58 - 91)  BP: 114/56 (15 Mar 2021 16:16) (114/56 - 148/74)  BP(mean): 68 (15 Mar 2021 16:16) (68 - 68)  RR: 18 (15 Mar 2021 08:37) (18 - 18)  SpO2: 100% (15 Mar 2021 16:16) (100% - 100%)    PHYSICAL EXAM:  General: Well developed;  frail elderly male,  in no acute distress  Eyes: PERRLA, EOMI; conjunctiva and sclera clear  Head: Normocephalic;  mid fronal laceration, repaired, staples in place   ENMT: No nasal discharge; airway clear  Neck: Supple; non tender; no masses  Respiratory: No wheezes, rales or rhonchi  Cardiovascular: Irregular rate and rhythm. S1 and S2 Normal;   Gastrointestinal: Soft non-tender non-distended; Normal bowel sounds  Genitourinary: No  suprapubic  tenderness  Extremities:  No edema  Vascular: Peripheral pulses palpable 2+ bilaterally  Neurological: Alert and oriented x3, non focal,  forgetful   Skin: Warm and dry. No acute rash  Musculoskeletal: Normal muscle tone, without deformities  Psychiatric: Cooperative    A/P:         79 y/o male with PMH of CAD s/p CABGx4 and stentsx3, CHF EF 40% 2017,  HTN, HLD, DM2, prostate CA, h/o lung malignancy s/p radiation, h/o RUL  partial lobectomy for recurrent PNA, Afib on xarelto, COPD admitted for:     1. Fall, likely mechanical . Scalp  laceration,  s/p staples  Tele: AFIB, PVCs , HR  controlled, no tachy arrhythmias   S/p gentle IVF   Orthostatics  CT head and C-spine neg for acute  findings  Trauma work up neg   Fall precautions  Routine local wound care   Pt eval appreciated       2. SOB on admission, but pulse ox stable  now denies, no  further  issues   CXR reviewed, RLL infiltrate not new, unlikely infection   CT chest done, stable   D/w Dr Shepherd, f/u outPt     3. HENRIQUE, resolved   S/p gentle hydration  Continue to hold lasix, resume  Entresto    Monitor UO   Repeat BMP in 2-3 days     4. CAD s/p CABGx4 and stentsx3  trop neg   no active CP  C/w ASA, metoprolol        5. H/o HFrEF, clinically not in failure  tolerated IVF well   Resume entresto, hold  lasix for  now  C/w spironolactone   Daily weight, Is and OS   D/w Dr Sauceda, needs to f/u with his  cardio outOt in 1 week to decide if needs  to restart lasix         6. AFIB   tele: AFIB rate controlled  C/w        DM2  HB A1c 6.5  Monitor BS and cover with ISS       dvt prophylaxis   -cont xarelto    Dispo:  d/c planning in am, SNF was not able to accommodate transfer on Sunday 77 y/o male with PMH of CAD s/p CABGx4 and stentsx3, CHF EF 40% 2017,  HTN, HLD, DM2, prostate CA, h/o lung malignancy s/p radiation, h/o RUL  partial lobectomy for recurrent PNA, Afib on xarelto, COPD BIBEMS from [facility] for fall.  Pt states  he was in the bathroom, bent over fell and hit his head.  Pt was eventually able to crawl to the bed and lift himself to sitting prior to calling EMS.  Pt denies CP but has SOB which is his baseline. no preceding chest pain, sob, palpitations, lightheadedness, or blurry vision.    In ED, c/o neck and right shoulder pain. CT head -no fx, ct pelvis-no fx, c-spine-no fx, CTA  chest-no PE, chronic lung changes.  ECG -afib, tropx1-neg Pts scalp laceration was repaired   Pt was admitted for further  monitoring. On tele no events, AFIB, rate controlled. On imaging CXR noted RLL infiltrate, past CT reviewed with Dr Shepherd, similar infiltrate ion the past, repeat CT recommended for stability , was done, no changes. Will need to further f/u outPt with Pulm. Also has h/o suspected  pancreatic mass, unclear if Pt followed outPt, can not recall. CT with IV contrast done,  no evidence of pancreatic mass.   Pt was seen and examined, reports no complains, states that feels tired and wants to go back to facility. As per RN had episode of dizziness this am, orthostatics checked and were positive, Pt was given 500ml bolus and repeat Ortostatics with improvement     Vital Signs Last 24 Hrs  T(C): 36.3 (15 Mar 2021 16:16), Max: 36.8 (14 Mar 2021 21:05)  T(F): 97.3 (15 Mar 2021 16:16), Max: 98.2 (14 Mar 2021 21:05)  HR: 64 (15 Mar 2021 16:16) (58 - 91)  BP: 114/56 (15 Mar 2021 16:16) (114/56 - 148/74)  BP(mean): 68 (15 Mar 2021 16:16) (68 - 68)  RR: 18 (15 Mar 2021 08:37) (18 - 18)  SpO2: 100% (15 Mar 2021 16:16) (100% - 100%)      PHYSICAL EXAM:  General: Well developed;  frail elderly male,  in no acute distress  Eyes: PERRLA, EOMI; conjunctiva and sclera clear  Head: Normocephalic;  mid fronal laceration, repaired, staples in place   ENMT: No nasal discharge; airway clear  Neck: Supple; non tender; no masses  Respiratory: No wheezes, rales or rhonchi  Cardiovascular: Irregular rate and rhythm. S1 and S2 Normal;   Gastrointestinal: Soft non-tender non-distended; Normal bowel sounds  Genitourinary: No  suprapubic  tenderness  Extremities:  No edema  Vascular: Peripheral pulses palpable 2+ bilaterally  Neurological: Alert and oriented x3, non focal,  forgetful   Skin: Warm and dry. No acute rash  Musculoskeletal: Normal muscle tone, without deformities  Psychiatric: Cooperative    A/P:         77 y/o male with PMH of CAD s/p CABGx4 and stentsx3, CHF EF 40% 2017,  HTN, HLD, DM2, prostate CA, h/o lung malignancy s/p radiation, h/o RUL  partial lobectomy for recurrent PNA, Afib on xarelto, COPD admitted for:     1. Fall, likely mechanical. Scalp  laceration,  s/p staples. orthostatic Hypotension, resolved   Tele: AFIB, PVCs , HR  controlled, no tachy arrhythmias   S/p gentle IVF   Orthostatics + , but improved with IVF  CT head and C-spine neg for acute  findings  Trauma work up neg   Fall precautions  Routine local wound care   Pt eval appreciated       2. SOB on admission, but pulse ox stable  now denies, no  further  issues   CXR reviewed, RLL infiltrate not new, unlikely infection   CT chest done, stable   D/w Dr Shepherd, f/u outPt     3. HENRIQUE, resolved   S/p gentle hydration  Continue to hold lasix  Monitor UO   Repeat BMP in 2-3 days     4. CAD s/p CABGx4 and stentsx3  trop neg   no active CP  C/w ASA, metoprolol        5. H/o HFrEF, clinically not in failure  tolerated IVF well. orthostatics improved   hold  lasix for 2-3 days, reassess and resume lasix if needed   C/w spironolactone   Daily weight, Is and OS   D/w Dr Sauceda, needs to f/u with his  cardio outOt in 1 week to decide  on ACEI/ARB/Entresto         6. AFIB   tele: AFIB rate controlled  C/w Toprol and XArelto, dose adjusted        DM2  HB A1c 6.5  Monitor BS and cover with ISS       dvt prophylaxis   -cont xarelto    Dispo:  d/c planning tp SNF   Total tiime 40 min   FAx d/c summary to PCP

## 2021-03-15 NOTE — PROGRESS NOTE ADULT - ASSESSMENT
79 y/o male with PMH of CAD s/p CABGx4 and stentsx3, CHF EF 40% 2017,  HTN, HLD, DM2, prostate CA, h/o lung malignancy s/p radiation, h/o RUL  partial lobectomy for recurrent PNA, Afib on xarelto, COPD BIBEMS from [facility] for fall after head strike. He had complained of baseline SOB but found to have mild HENRIQUE so he was given some IVF and is now improved.    -No sign of CHF. He was hypovolemic on admission with elevated BUN/Cr and Ca, improved with IVF. He did get his aldactone and lasix today. Discussed with primary team. Recommend holding his lasix for 2 days and then restarting. Can be done as an outpatient.  -He otherwise remains stable. He does not often fall or hit his head and is a CHADSVASc of at least 5, agree with c/o xarelto. Will need to monitor his renal function, if improves he may need to increase Xarelto to 20, can be done as an outpatient with his outside cardiologist Dr. Lam.  -With h/o HFrEF/CM can consider transitioning to metoprolol succinate 25mg Qday. Again he has an outside cardiologist so can be done as an outpatient No on coreg likely due to low BP. No ACE/ARB/Entresto currently due to recent HENRIQUE. Can consider starting low dose of these as an outpatient.   -Patient remains stable. OK to D/C home from a cardiac standpoint with close follow-up with his outside cardiologist.
77 y/o male with PMH of CAD s/p CABGx4 and stentsx3, CHF EF 40% 2017,  HTN, HLD, DM2, prostate CA, h/o lung malignancy s/p radiation, h/o RUL  partial lobectomy for recurrent PNA, Afib on xarelto, COPD admitted for:     1. Fall, likely mechanical . Scalp  laceration s/p staples  Tele: AFIB, PVCs , HR  controlled, had 1 episode of HR of 120s  S/p gentle IVF overnight   Check orthostatics  CT head and C-spine neg for acute  findings  Trauma work up neg   Fall precautions  Routine local wound care   Pt eval       2. SOB on admission, but pulse ox stable  now denies  CXR reviewed, RLL infiltrate not new, unlikely infection   D/w Dr Shepherd, needs repeat CT chest to monitor stability       3. HENRIQUE, resolved   S/p gentle hydration  would hold lasix and Entresto  for a few days   Monitor UO   labs in am     4. CAD s/p CABGx4 and stentsx3  trop neg   no active CP  C/w ASA, metoprolol        5. H/o HFrEF, clinically not in failure  tolerated IVF well   Hold entresto and lasix for  now  C/w spironolactone   Daily weight, Is and OS   D/w Dr Sauceda         DM2  HB A1c 6.5  Monitor BS and cover with ISS       dvt prophylaxis   -cont xarelto    Dispo:  d/c planning in am, SNF was not able to accommodate transfer on Sunday   
CXR findings R base appear to be similar, unchanged c/w 10/2017 CXR.    Stable chronic findings on PET scan 5/2020 of R lower lungfield, c/w previous CT chest 10/2017.  For CT chest while in hospital for further following.  Would also scan abdomen/pelvis with IV contrast re finding in pancreas on previous CT scan chest.  He refused imaging for the latter previously.  He will also need to continue to follow with his urologist after discharge.

## 2021-03-15 NOTE — DISCHARGE NOTE NURSING/CASE MANAGEMENT/SOCIAL WORK - PATIENT PORTAL LINK FT
You can access the FollowMyHealth Patient Portal offered by [facility] by registering at the following website: [email or web address]. By joining [employer]’s FollowMyHealth portal, you will also be able to view your health information using other applications (apps) compatible with our system.

## 2021-03-15 NOTE — DISCHARGE NOTE PROVIDER - CARE PROVIDER_API CALL
Shana Kelly)  Internal Medicine  [address]  [place] [address]  Phone: [phone]  Fax: [phone]  Follow Up Time: 1-3 days    Cardiologist,   Phone: (   )    -  Fax: (   )    -  Follow Up Time: 1 week

## 2021-03-15 NOTE — DISCHARGE NOTE PROVIDER - NSDCMRMEDTOKEN_GEN_ALL_CORE_FT
aspirin 81 mg oral tablet: 1 tab(s) orally once a day  atorvastatin 20 mg oral tablet: 1 tab(s) orally once a day (at bedtime)  Digitek 125 mcg (0.125 mg) oral tablet: 1 tab(s) orally once a day  DuoNeb 0.5 mg-2.5 mg/3 mL inhalation solution: 3 milliliter(s) inhaled 4 times a day as needed for sob/wheezing   furosemide 20 mg oral tablet: 1 tab(s) orally once a day  Lexapro 5 mg oral tablet: 1 tab(s) orally once a day  metFORMIN 500 mg oral tablet: 1 tab(s) orally once a day **As per wife**  metOLazone 2.5 mg oral tablet: 1 tab(s) orally 2 times a week on Mondays and Thursdays  Metoprolol Tartrate 25 mg oral tablet: 0.5 tab(s) orally 2 times a day  pantoprazole 40 mg oral delayed release tablet: 1 tab(s) orally once a day  spironolactone 25 mg oral tablet: 1 tab(s) orally once a day  tamsulosin 0.4 mg oral capsule: 1 cap(s) orally once a day (at bedtime)  traZODone 50 mg oral tablet: 1 tab(s) orally once a day (at bedtime)  Xarelto 15 mg oral tablet: 1 tab(s) orally every 24 hours   aspirin 81 mg oral tablet: 1 tab(s) orally once a day  cholecalciferol 1000 intl units (25 mcg) oral tablet: 1 tab(s) orally once a day  DuoNeb 0.5 mg-2.5 mg/3 mL inhalation solution: 3 milliliter(s) inhaled 4 times a day as needed for sob/wheezing   Lexapro 5 mg oral tablet: 1 tab(s) orally once a day  pantoprazole 40 mg oral delayed release tablet: 1 tab(s) orally once a day  spironolactone 25 mg oral tablet: 1 tab(s) orally once a day  tamsulosin 0.4 mg oral capsule: 1 cap(s) orally once a day (at bedtime)  Toprol-XL 25 mg oral tablet, extended release: 1 tab(s) orally once a day  traZODone 50 mg oral tablet: 1 tab(s) orally once a day (at bedtime)

## 2021-03-15 NOTE — DISCHARGE NOTE PROVIDER - CARE PROVIDERS DIRECT ADDRESSES
[email or web address]

## 2021-03-15 NOTE — PROVIDER CONTACT NOTE (OTHER) - SITUATION
Attempted to do orthostatic bp's patient became dizzy and nauseas. Dizziness subsided when he sat down .

## 2021-03-15 NOTE — DISCHARGE NOTE PROVIDER - PROVIDER TOKENS
PROVIDER:[TOKEN:[[id number]],FOLLOWUP:[1-3 days]],FREE:[LAST:[Cardiologist],PHONE:[(   )    -],FAX:[(   )    -],FOLLOWUP:[1 week]]

## 2021-03-15 NOTE — PROGRESS NOTE ADULT - SUBJECTIVE AND OBJECTIVE BOX
CHIEF COMPLAINT: Patient is a 80y old  Male who presents with a chief complaint of fall      HPI:  77 y/o male with PMH of CAD s/p CABGx4 and stentsx3, CHF EF 40% 2017,  HTN, HLD, DM2, prostate CA, h/o lung malignancy s/p radiation, h/o RUL  partial lobectomy for recurrent PNA, Afib on xarelto, COPD BIBEMS from [facility] for fall.  Pt states  he was in the bathroom picking up a urine bottle and when he stood up he hit his head on the sink and fell.  Pt was eventually able to crawl to the bed and lift himself to sitting prior to calling EMS.  In ER patient stated he has his baseline SOB that was stable but was found to have some HENRIQUE/hypovolemia so he was given some IVF for about 10 hours but then restarted on his aldactone/lasix.    3/14 - No acute events O/N. Patient denies frequent falls and has never hit his head like this before.    Patient see and examined at bedside  He is now denying SOB.  He denies any fevers, chills, CP, palp, abd pain, N/V, dizziness, syncope, orthopnea, PND. He did not LOC when he his his head/fell.    PMHx: PAST MEDICAL & SURGICAL HISTORY:  Osteoarthritis    Lung cancer    DM (diabetes mellitus)    Cataracts, bilateral  lens implants    Prostate ca    BPH (benign prostatic hyperplasia)    Carotid stenosis, left    CAD (coronary artery disease)  S/P coronary artery stents 2002, 2003    HLD (hyperlipidemia)    HTN (hypertension)    History of laminectomy    H/O spinal fusion  1997    S/P CABG x 3  1997    S/P lobectomy of lung  right upper 1998    S/P TURP    Stented coronary artery  2002, 2003    S/P carotid endarterectomy  left, S/P left carotid stent 2000    Social History:  former smoker    Allergies: Allergies  morphine (Hypotension)  oxycodone (Other)      REVIEW OF SYSTEMS:  10 point ROS was obtained and indicated above otherwise was negative    Vital Signs Last 24 Hrs  T(C): 36.8 (14 Mar 2021 08:35), Max: 37.1 (13 Mar 2021 15:15)  T(F): 98.3 (14 Mar 2021 08:35), Max: 98.7 (13 Mar 2021 15:15)  HR: 58 (14 Mar 2021 08:35) (57 - 91)  BP: 112/63 (14 Mar 2021 08:35) (112/63 - 151/94)  BP(mean): --  RR: 18 (14 Mar 2021 08:35) (17 - 18)  SpO2: 100% (14 Mar 2021 08:35) (95% - 100)    PHYSICAL EXAM:   Constitutional: NAD, awake and alert, well-developed. Able to lay completely flat without distress.  HEENT: EOMI, MMM, healing cut on top of his head, no JVD  Respiratory: CTA B/L, good air movement. no W/R/R  Cardiovascular: irreg/irreg, soft systolic murmur at LSB.  Gastrointestinal: Bowel Sounds present, soft, nontender, nondistended, no guarding, no rebound  Extremities: No peripheral edema  Vascular: 2+ peripheral pulses    MEDICATIONS  (STANDING):  aspirin  chewable 81 milliGRAM(s) Oral daily  dextrose 40% Gel 15 Gram(s) Oral once  dextrose 5%. 1000 milliLiter(s) (50 mL/Hr) IV Continuous <Continuous>  dextrose 50% Injectable 25 Gram(s) IV Push once  escitalopram 5 milliGRAM(s) Oral daily  glucagon  Injectable 1 milliGRAM(s) IntraMuscular once  insulin lispro (ADMELOG) corrective regimen sliding scale   SubCutaneous three times a day before meals  metoprolol tartrate 12.5 milliGRAM(s) Oral two times a day  pantoprazole    Tablet 40 milliGRAM(s) Oral before breakfast  rivaroxaban 15 milliGRAM(s) Oral with dinner  sodium chloride 0.9%. 1000 milliLiter(s) (55 mL/Hr) IV Continuous <Continuous>  spironolactone 25 milliGRAM(s) Oral daily  tamsulosin Oral Tab/Cap - Peds 0.4 milliGRAM(s) Oral at bedtime  traZODone 50 milliGRAM(s) Oral at bedtime    MEDICATIONS  (PRN):  acetaminophen   Tablet .. 650 milliGRAM(s) Oral every 6 hours PRN Mild Pain (1 - 3), Moderate Pain (4 - 6)      LABS: All Labs Reviewed:                        13.9   6.47  )-----------( 159      ( 14 Mar 2021 06:49 )             41.6                         14.8   7.19  )-----------( 164      ( 13 Mar 2021 06:42 )             43.4     03-14    138  |  102  |  28<H>  ----------------------------<  96  3.8   |  32<H>  |  1.12    Ca    9.9      14 Mar 2021 06:49  Mg     1.8     03-13    TPro  7.4  /  Alb  3.7  /  TBili  0.7  /  DBili  x   /  AST  22  /  ALT  28  /  AlkPhos  84  03-13    03-13    136  |  100  |  40<H>  ----------------------------<  196<H>  4.5   |  26  |  1.43<H>    Ca    10.2<H>      13 Mar 2021 06:42  Mg     1.8     03-13    TPro  7.4  /  Alb  3.7  /  TBili  0.7  /  DBili  x   /  AST  22  /  ALT  28  /  AlkPhos  84  03-13      CARDIAC MARKERS ( 13 Mar 2021 10:18 )  <0.015 ng/mL / x     / x     / x     / x      CARDIAC MARKERS ( 13 Mar 2021 06:42 )  <0.015 ng/mL / x     / x     / x     / x        RADIOLOGY:    < from: Xray Chest 1 View-PORTABLE IMMEDIATE (03.13.21 @ 06:50) >  INTERPRETATION:  HISTORY: ; ; fall/syncope;  TECHNIQUE: Portable frontal view of the chest, 2 views.  COMPARISON: 10/18/2017.  FINDINGS/  IMPRESSION:    HEART: Enlarged in this projection. sternotomy wires.  LUNGS: Hyperlucent lungs compatible with COPD. Right lower lung opacity with pleural retraction at the right base, similar to prior study. No pneumothorax.  BONES: degenerative changes    < from: CT Head No Cont (03.13.21 @ 06:08) >  IMPRESSION:    CT BRAIN:    No evidence of acute intracranial hemorrhage, midline shift or CT evidence of acute territorial infarct.  If the patient's symptoms persist, consider short interval follow-up head CT or brain MRI if there are no MRI contraindications.    CT CERVICAL SPINE:    No acute cervical fracture or traumatic malalignment.  MRI would be required to evaluate the ligamentous structures at higher sensitivity as well as for better evaluation of the cervical canal and its contents.    < from: NM PET/CT Onc FDG Skull to Thigh, Inital (05.18.20 @ 16:26) >  EXAM:  PETCT AFIA ONC FDG INIT        PROCEDURE DATE:  05/18/2020           INTERPRETATION:  PROCEDURE:  PET/CT SKULL BASE-MID THIGH IMAGING     RADIOPHARMACEUTICAL:  12.0 mCi F-18, FDG, I.V.    CLINICAL INFORMATION: Abnormal masslike opacity right lower lobe seen on outside CT. PET/CT is done as part of the initial treatment strategy evaluation.    TECHNIQUE:  Fasting blood sugar measured prior to injection of radiopharmaceutical was 134 mg/dl. Following intravenous injection of the radiopharmaceutical and an uptake period of approximately 50 minutes, FDG-PET/CT was obtained on a Siemens ZeaVisiongraph mCT 64  scanner from the skull base to mid thigh. Oral contrast  was administered during the uptake period. CT was performed during shallow respiration. The CT protocol was optimized for PET attenuation correction and to provide anatomic detail for localization of PET abnormalities. The CT protocol was not designed to produce and cannot replace state-of-the-art diagnostic CT images with specific imaging protocols for different body parts and indications. Images were reviewed on a dedicated workstation using multiplanar reconstruction.    The standardized uptake values (SUV) are normalized to patient body weight and indicate the highestactivity concentration (SUVmax) in a given disease site. All image numbers refer to axial image number.    COMPARISON:  Report only of CT chest from [facility] May 12, 2020    OTHER STUDIES USED FOR CORRELATION: CT angiogram chest August 8, 2018, CT chest October 18, 2017    FINDINGS:    HEAD/NECK: No abnormal avidity. Physiologic FDG activity seen in the visualized portions of the brain, major salivary glands and neck muscles.  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 No abnormal avidity.Physiologic FDG activity in the myocardium and blood pool.    LUNGS: Stable postsurgical changes of right upper lobe wedge resection, without focal FDG avidity along the suture lines. Pleural-based masslike consolidation approximately 6.2 x 2.5 cm with homogeneous FDG avidity SUV 3.8; image 120), unchanged in size since CT chest August 28, 2018 where it measured 6.6 x 2.4 cm and 6.5 x 2.8 cm on CT chest October 18, 2017. Emphysema.   PLEURA/PERICARDIUM: Small loculated right posterior medial pneumothorax (image 113). No abnormal avidity. No pericardial effusion. Minimal unchanged pleural thickening.   HEPATOBILIARY/PANCREAS:  Liver background SUV mean as a reference for comparing studies is 3.2. Cholelithiasis. Atrophic pancreas.  SPLEEN: No abnormal avidity.  ADRENAL GLANDS: No abnormal avidity.  KIDNEYS/URINARY BLADDER: Excreted activity is seen.   No abnormal avidity.  ABDOMINOPELVIC NODES:   No abnormal avidity.  BOWEL/PERITONEUM/MESENTERY:  No abnormal avidity. Sigmoid colon diverticulosis.  PELVIC ORGANS: No abnormal avidity.  BONES:. Unchanged deformity of the right posterior lateral sixth, seventh and eighth ribs, unchanged since 2017. Degenerative changes throughout the spine.  SOFT TISSUES:  No abnormal avidity.  OTHER FINDINGS: Aortobiiliac and branch vessel atherosclerotic calcifications.    IMPRESSION:      1.  Masslike consolidation subpleural right lower lobe with low-grade uniform FDG avidity, unchanged in size dating back to CT chest October 18, 2017. Etiology is indeterminate, however may represent postsurgical sequela. Continued CT chest follow-up suggested.  2.  Unchanged small loculated posterior medial right pneumothorax dating back to 2017.  3.Deformities of the right posterior lateral sixth through eighth ribs, unchanged since 2017.    < end of copied text >      EKG:  atrial fibrillation 73bpm, LAD, RBBB/LAFB    Telemetry:  Reviewed, in AFib HRs go to 40-50s when sleeping otherwise gots up to 100s.    
CC: fall (14 Mar 2021 15:57)    HPI:  77 y/o male with PMH of CAD s/p CABGx4 and stentsx3, CHF EF 40% 2017,  HTN, HLD, DM2, prostate CA, h/o lung malignancy s/p radiation, h/o RUL  partial lobectomy for recurrent PNA, Afib on xarelto, COPD BIBEMS from [facility] for fall.  Pt states  he was in the bathroom, bent over fell and hit his head.  Pt was eventually able to crawl to the bed and lift himself to sitting prior to calling EMS.  Pt denies CP but has SOB which is his baseline. no preceding chest pain, sob, palpitations, lightheadedness, or blurry vision.      In ED,c/o neck and right shoulder pain. cth-no fx, ct pelvis-no fx, c-spine-no fx, cta chest-no PE, chronic lung changes.  ecg-afib, tropx1-neg   (13 Mar 2021 11:13)    INTERVAL HPI/OVERNIGHT EVENTS:    Vital Signs Last 24 Hrs  T(C): 36.2 (14 Mar 2021 16:43), Max: 36.8 (14 Mar 2021 08:35)  T(F): 97.2 (14 Mar 2021 16:43), Max: 98.3 (14 Mar 2021 08:35)  HR: 68 (14 Mar 2021 16:43) (58 - 91)  BP: 134/76 (14 Mar 2021 16:43) (112/63 - 134/76)  BP(mean): 91 (14 Mar 2021 16:43) (91 - 91)  RR: 18 (14 Mar 2021 08:35) (18 - 18)  SpO2: 100% (14 Mar 2021 16:43) (95% - 100%)  I&O's Detail    13 Mar 2021 06:01  -  14 Mar 2021 07:00  --------------------------------------------------------  IN:  Total IN: 0 mL    OUT:    Voided (mL): 880 mL  Total OUT: 880 mL    Total NET: -880 mL        REVIEW OF SYSTEMS:    CONSTITUTIONAL: No weakness, fevers or chills  EYES/ENT: No visual changes;  No vertigo or throat pain   NECK: No pain or stiffness  RESPIRATORY: No cough, wheezing, hemoptysis; No shortness of breath  CARDIOVASCULAR: No chest pain or palpitations  GASTROINTESTINAL: No abdominal or epigastric pain. No nausea, vomiting, or hematemesis; No diarrhea or constipation. No melena or hematochezia.  GENITOURINARY: No dysuria, frequency or hematuria  NEUROLOGICAL: No numbness or weakness  SKIN: No itching, burning, rashes, or lesions   All other review of systems is negative unless indicated above.  PHYSICAL EXAM:    General: Well developed; well nourished; in no acute distress  Eyes: PERRLA, EOMI; conjunctiva and sclera clear  Head: Normocephalic; atraumatic  ENMT: No nasal discharge; airway clear  Neck: Supple; non tender; no masses  Respiratory: No wheezes, rales or rhonchi  Cardiovascular: Regular rate and rhythm. S1 and S2 Normal; No murmurs, gallops or rubs  Gastrointestinal: Soft non-tender non-distended; Normal bowel sounds  Genitourinary: No  suprapubic  tenderness  Extremities: Normal range of motion, No clubbing, cyanosis or edema  Vascular: Peripheral pulses palpable 2+ bilaterally  Neurological: Alert and oriented x4  Skin: Warm and dry. No acute rash  Lymph Nodes: No acute cervical adenopathy  Musculoskeletal: Normal muscle tone, without deformities  Psychiatric: Cooperative and appropriate  CARDIAC MARKERS ( 13 Mar 2021 14:48 )  <0.015 ng/mL / x     / x     / x     / x      CARDIAC MARKERS ( 13 Mar 2021 13:17 )  <0.015 ng/mL / x     / x     / x     / x      CARDIAC MARKERS ( 13 Mar 2021 10:18 )  <0.015 ng/mL / x     / x     / x     / x      CARDIAC MARKERS ( 13 Mar 2021 06:42 )  <0.015 ng/mL / x     / x     / x     / x                                13.9   6.47  )-----------( 159      ( 14 Mar 2021 06:49 )             41.6     14 Mar 2021 06:49    138    |  102    |  28     ----------------------------<  96     3.8     |  32     |  1.12     Ca    9.9        14 Mar 2021 06:49  Mg     1.8       13 Mar 2021 06:42    TPro  7.4    /  Alb  3.7    /  TBili  0.7    /  DBili  x      /  AST  22     /  ALT  28     /  AlkPhos  84     13 Mar 2021 06:42      CAPILLARY BLOOD GLUCOSE      POCT Blood Glucose.: 170 mg/dL (14 Mar 2021 17:12)  POCT Blood Glucose.: 185 mg/dL (14 Mar 2021 12:23)  POCT Blood Glucose.: 171 mg/dL (14 Mar 2021 08:27)  POCT Blood Glucose.: 113 mg/dL (13 Mar 2021 20:55)  POCT Blood Glucose.: 204 mg/dL (13 Mar 2021 18:22)    LIVER FUNCTIONS - ( 13 Mar 2021 06:42 )  Alb: 3.7 g/dL / Pro: 7.4 gm/dL / ALK PHOS: 84 U/L / ALT: 28 U/L / AST: 22 U/L / GGT: x           Urinalysis Basic - ( 13 Mar 2021 08:49 )    Color: Yellow / Appearance: Clear / S.010 / pH: x  Gluc: x / Ketone: Negative  / Bili: Negative / Urobili: Negative mg/dL   Blood: x / Protein: 30 mg/dL / Nitrite: Negative   Leuk Esterase: Trace / RBC: 0-2 /HPF / WBC 0-2   Sq Epi: x / Non Sq Epi: Occasional / Bacteria: Occasional        MEDICATIONS  (STANDING):  aspirin  chewable 81 milliGRAM(s) Oral daily  dextrose 40% Gel 15 Gram(s) Oral once  dextrose 5%. 1000 milliLiter(s) (50 mL/Hr) IV Continuous <Continuous>  dextrose 50% Injectable 25 Gram(s) IV Push once  escitalopram 5 milliGRAM(s) Oral daily  glucagon  Injectable 1 milliGRAM(s) IntraMuscular once  insulin lispro (ADMELOG) corrective regimen sliding scale   SubCutaneous three times a day before meals  metoprolol tartrate 12.5 milliGRAM(s) Oral two times a day  pantoprazole    Tablet 40 milliGRAM(s) Oral before breakfast  rivaroxaban 15 milliGRAM(s) Oral with dinner  sodium chloride 0.9%. 1000 milliLiter(s) (55 mL/Hr) IV Continuous <Continuous>  spironolactone 25 milliGRAM(s) Oral daily  tamsulosin Oral Tab/Cap - Peds 0.4 milliGRAM(s) Oral at bedtime  traZODone 50 milliGRAM(s) Oral at bedtime    MEDICATIONS  (PRN):  acetaminophen   Tablet .. 650 milliGRAM(s) Oral every 6 hours PRN Mild Pain (1 - 3), Moderate Pain (4 - 6)      RADIOLOGY & ADDITIONAL TESTS:  < from: CT 3D Reconstruct w/o Workstation (21 @ 07:20) >    EXAM:  CT PELVIS BONY ONLY                          EXAM:  CT 3D RECONSTRUCT WO RUTHANN                            PROCEDURE DATE:  2021          INTERPRETATION:  CT OF THE BONY PELVIS WITHOUT CONTRAST.    CLINICAL INFORMATION: Right hip pain. Evaluate for fracture.  TECHNIQUE: Axial CT images were obtained of the bony pelvis with coronal and sagittal reconstructions. No contrast was administered. Three dimensional reconstructions were obtained.    FINDINGS: The bones are demineralized.    There is fusion of the posterior limbs of the lower lumbar spine as well as the left SI joint. There is mild degenerative change of the right SI joint.    There is mild to moderate degenerative change of the bilateral hips. Enthesopathic changes are seen at the greater trochanters, pubic symphysis and ischial tuberosities.    There is vascular calcification present.    There is no hip or pelvic fracture identified.    IMPRESSION:  No fracture identified. If there is persistent concern for anoccult fracture, MRI can be obtained.        EXAM:  CT CERVICAL SPINE                        EXAM:  CT BRAIN                         PROCEDURE DATE:  2021    FINDINGS:  CT head:    There is no CT evidence of acute intracranial hemorrhage, mass effect, midline shift, or acute territorial infarct.    The ventricles and sulci are prominent consistent with age-related parenchymal volume loss. The basal cisterns are patent. There are periventricular white matter hypodensities that are nonspecific in nature but may reflect chronic ischemic microvascular disease.    The visualized paranasal sinuses are clear.  The mastoid air cells and middle ear cavities are grossly clear.  There is no depressed calvarial fracture.      CT cervical spine:  There is preservation of the cervical lordosis.  There is no CT evidence of an acute cervical spine fracture or traumatic malalignment.  There is no suspicious lytic or blastic lesion.  The paraspinous soft tissues are unremarkable within limits of CT scan.    Degenerative changes:  There are multilevel degenerative changes characterized by disc osteophyte complexes and facet and uncinate hypertrophy with resultant moderate to severe multilevel central canal and neural foraminal stenosis.    Incidental findings:  Left-sided common carotid artery stent noted. Atherosclerotic changes at the right carotid bifurcation. Pleural parenchymal scarring and emphysematous change at the lung apices.      IMPRESSION:    CT BRAIN:  No evidence of acute intracranial hemorrhage, midline shift or CT evidence of acute territorial infarct.  If the patient's symptoms persist, consider short interval follow-up head CT or brain MRI if there are no MRI contraindications.      CT CERVICAL SPINE:  No acute cervical fracture or traumatic malalignment.      
  HPI:  77 y/o male with PMH of CAD s/p CABGx4 and stentsx3, CHF EF 40% ,  HTN, HLD, DM2, prostate CA, h/o lung malignancy s/p radiation, h/o RUL  partial lobectomy for recurrent PNA, Afib on xarelto, COPD/emphysema BIBEMS from [facility] for fall.  Pt states  he was in the bathroom, bent over fell and hit his head.  Pt was eventually able to crawl to the bed and lift himself to sitting prior to calling EMS.  Pt denies CP but has SOB which is his baseline. no preceding chest pain, sob, palpitations, lightheadedness, or blurry vision.      In ED,c/o neck and right shoulder pain. cth-no fx, ct pelvis-no fx, c-spine-no fx, cta chest-no PE, chronic lung changes.  ecg-afib, tropx1-neg       (13 Mar 2021 11:13)    3/14: Pt has h.o. R apical wedge resection.  PET scan of 2020 showed stable findings R base c/w 10/2017 CT.  Pt denies SOB, cough, hemoptysis, fever.  Does say has lost weight.  He also has a h.o. previously elevated PSA greater than 100, 2019, [facility], likely metastatic prostate cancer.          Pt awake, in no distress. sat'ing well on RA.     3/15:  in bed, awake, no distress.       PAST MEDICAL & SURGICAL HISTORY:  Osteoarthritis    Lung cancer    DM (diabetes mellitus)    Cataracts, bilateral  lens implants    Prostate ca    BPH (benign prostatic hyperplasia)  &quot;chemo shrink prostate&quot;    Carotid stenosis, left    CAD (coronary artery disease)  S/P coronary artery stents ,     HLD (hyperlipidemia)    HTN (hypertension)    History of laminectomy    H/O spinal fusion      S/P CABG x 3      S/P lobectomy of lung  right upper     S/P TURP    Stented coronary artery  ,     S/P carotid endarterectomy  left, S/P left carotid stent         MEDICATIONS  (STANDING):  aspirin  chewable 81 milliGRAM(s) Oral daily  dextrose 40% Gel 15 Gram(s) Oral once  dextrose 5%. 1000 milliLiter(s) (50 mL/Hr) IV Continuous <Continuous>  dextrose 50% Injectable 25 Gram(s) IV Push once  escitalopram 5 milliGRAM(s) Oral daily  glucagon  Injectable 1 milliGRAM(s) IntraMuscular once  insulin lispro (ADMELOG) corrective regimen sliding scale   SubCutaneous three times a day before meals  metoprolol tartrate 12.5 milliGRAM(s) Oral two times a day  pantoprazole    Tablet 40 milliGRAM(s) Oral before breakfast  rivaroxaban 15 milliGRAM(s) Oral with dinner  sodium chloride 0.9%. 1000 milliLiter(s) (55 mL/Hr) IV Continuous <Continuous>  spironolactone 25 milliGRAM(s) Oral daily  tamsulosin Oral Tab/Cap - Peds 0.4 milliGRAM(s) Oral at bedtime  traZODone 50 milliGRAM(s) Oral at bedtime    MEDICATIONS  (PRN):  acetaminophen   Tablet .. 650 milliGRAM(s) Oral every 6 hours PRN Mild Pain (1 - 3), Moderate Pain (4 - 6)      Allergies    morphine (Hypotension)  oxycodone (Other)    Intolerances        SOCIAL HISTORY: Denies tobacco, etoh abuse or illicit drug use    FAMILY HISTORY:  No pertinent family history in first degree relatives        Vital Signs Last 24 Hrs  T(C): 36.8 (14 Mar 2021 08:35), Max: 36.8 (13 Mar 2021 18:00)  T(F): 98.3 (14 Mar 2021 08:35), Max: 98.3 (14 Mar 2021 08:35)  HR: 58 (14 Mar 2021 08:35) (58 - 91)  BP: 112/63 (14 Mar 2021 08:35) (112/63 - 151/94)  BP(mean): --  RR: 18 (14 Mar 2021 08:35) (18 - 18)  SpO2: 100% (14 Mar 2021 08:35) (95% - 100%)    REVIEW OF SYSTEMS:    CONSTITUTIONAL:  As per HPI.  HEENT:  Eyes:  No diplopia or blurred vision. ENT:  No earache, sore throat or runny nose.  CARDIOVASCULAR:  No pressure, squeezing, tightness, heaviness or aching about the chest, neck, axilla or epigastrium.  RESPIRATORY:  No cough, shortness of breath, PND or orthopnea.  GASTROINTESTINAL:  No nausea, vomiting or diarrhea.  GENITOURINARY:  No dysuria, frequency or urgency.  MUSCULOSKELETAL:  As per HPI.  SKIN:  No change in skin, hair or nails.  NEUROLOGIC:  No paresthesias, fasciculations, seizures or weakness.  PSYCHIATRIC:  No disorder of thought or mood.  ENDOCRINE:  No heat or cold intolerance, polyuria or polydipsia.  HEMATOLOGICAL:  No easy bruising or bleedings:  .     PHYSICAL EXAMINATION:    GENERAL APPEARANCE:  Pt. is not currently dyspneic, in no distress. Pt. is alert, oriented, and pleasant.  HEENT:  Pupils are normal and react normally. No icterus. Mucous membranes well colored.  NECK:  Supple. No lymphadenopathy. Jugular venous pressure not elevated. Carotids equal.   HEART:   The cardiac impulse has a normal quality.  HR 70.  CHEST:  Few rhonchi bilat.  ABDOMEN:  Soft and nontender.   EXTREMITIES:  There is no cyanosis, clubbing or edema.   SKIN:  No rash or significant lesions are noted.  Neuro: Awake.      LABS:                        13.9   6.47  )-----------( 159      ( 14 Mar 2021 06:49 )             41.6     03-14    138  |  102  |  28<H>  ----------------------------<  96  3.8   |  32<H>  |  1.12    Ca    9.9      14 Mar 2021 06:49  Mg     1.8     03-13    TPro  7.4  /  Alb  3.7  /  TBili  0.7  /  DBili  x   /  AST  22  /  ALT  28  /  AlkPhos  84  03-13    LIVER FUNCTIONS - ( 13 Mar 2021 06:42 )  Alb: 3.7 g/dL / Pro: 7.4 gm/dL / ALK PHOS: 84 U/L / ALT: 28 U/L / AST: 22 U/L / GGT: x             CARDIAC MARKERS ( 13 Mar 2021 14:48 )  <0.015 ng/mL / x     / x     / x     / x      CARDIAC MARKERS ( 13 Mar 2021 13:17 )  <0.015 ng/mL / x     / x     / x     / x      CARDIAC MARKERS ( 13 Mar 2021 10:18 )  <0.015 ng/mL / x     / x     / x     / x      CARDIAC MARKERS ( 13 Mar 2021 06:42 )  <0.015 ng/mL / x     / x     / x     / x          Urinalysis Basic - ( 13 Mar 2021 08:49 )    Color: Yellow / Appearance: Clear / S.010 / pH: x  Gluc: x / Ketone: Negative  / Bili: Negative / Urobili: Negative mg/dL   Blood: x / Protein: 30 mg/dL / Nitrite: Negative   Leuk Esterase: Trace / RBC: 0-2 /HPF / WBC 0-2   Sq Epi: x / Non Sq Epi: Occasional / Bacteria: Occasional          RADIOLOGY & ADDITIONAL STUDIES:       EXAM:  PETCT SKUL-THI ONC FDG INIT        PROCEDURE DATE:  2020           INTERPRETATION:  PROCEDURE:  PET/CT SKULL BASE-MID THIGH IMAGING     RADIOPHARMACEUTICAL:  12.0 mCi F-18, FDG, I.V.    CLINICAL INFORMATION: Abnormal masslike opacity right lower lobe seen on outside CT. PET/CT is done as part of the initial treatment strategy evaluation.    TECHNIQUE:  Fasting blood sugar measured prior to injection of radiopharmaceutical was 134 mg/dl. Following intravenous injection of the radiopharmaceutical and an uptake period of approximately 50 minutes, FDG-PET/CT was obtained on a Siemens Biograph mCT 64  scanner from the skull base to mid thigh. Oral contrast  was administered during the uptake period. CT was performed during shallow respiration. The CT protocol was optimized for PET attenuation correction and to provide anatomic detail for localization of PET abnormalities. The CT protocol was not designed to produce and cannot replace state-of-the-art diagnostic CT images with specific imaging protocols for different body parts and indications. Images were reviewed on a dedicated workstation using multiplanar reconstruction.    The standardized uptake values (SUV) are normalized to patient body weight and indicate the highestactivity concentration (SUVmax) in a given disease site. All image numbers refer to axial image number.    COMPARISON:  Report only of CT chest from [facility] May 12, 2020    OTHER STUDIES USED FOR CORRELATION: CT angiogram chest 2018, CT chest 2017    FINDINGS:      HEAD/NECK: No abnormal avidity. Physiologic FDG activity seen in the visualized portions of the brain, major salivary glands and neck muscles.    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 No abnormal avidity.Physiologic FDG activity in the myocardium and blood pool.      LUNGS: Stable postsurgical changes of right upper lobe wedge resection, without focal FDG avidity along the suture lines. Pleural-based masslike consolidation approximately 6.2 x 2.5 cm with homogeneous FDG avidity SUV 3.8; image 120), unchanged in size since CT chest 2018 where it measured 6.6 x 2.4 cm and 6.5 x 2.8 cm on CT chest 2017. Emphysema.     PLEURA/PERICARDIUM: Small loculated right posterior medial pneumothorax (image 113). No abnormal avidity. No pericardial effusion. Minimal unchanged pleural thickening.     HEPATOBILIARY/PANCREAS:  Liver background SUV mean as a reference for comparing studies is 3.2. Cholelithiasis. Atrophic pancreas.    SPLEEN: No abnormal avidity.    ADRENAL GLANDS: No abnormal avidity.    KIDNEYS/URINARY BLADDER: Excreted activity is seen.   No abnormal avidity.    ABDOMINOPELVIC NODES:   No abnormal avidity.    BOWEL/PERITONEUM/MESENTERY:  No abnormal avidity. Sigmoid colon diverticulosis.    PELVIC ORGANS: No abnormal avidity.    BONES:. Unchanged deformity of the right posterior lateral sixth, seventh and eighth ribs, unchanged since 2017. Degenerative changes throughout the spine.    SOFT TISSUES:  No abnormal avidity.    OTHER FINDINGS: Aortobiiliac and branch vessel atherosclerotic calcifications.    IMPRESSION:      1.  Masslike consolidation subpleural right lower lobe with low-grade uniform FDG avidity, unchanged in size dating back to CT chest 2017. Etiology is indeterminate, however may represent postsurgical sequela. Continued CT chest follow-up suggested.    2.  Unchanged small loculated posterior medial right pneumothorax dating back to 2017.    3.Deformities of the right posterior lateral sixth through eighth ribs, unchanged since 2017.          EXAM:  XR CHEST PORTABLE IMMED 1V                            PROCEDURE DATE:  2021          INTERPRETATION:  HISTORY: ; ; fall/syncope;  TECHNIQUE: Portable frontal view of the chest, 2 views.  COMPARISON: 10/18/2017.  FINDINGS/  IMPRESSION:    HEART: Enlarged in this projection. sternotomy wires.  LUNGS: Hyperlucent lungs compatible with COPD. Right lower lung opacity with pleural retraction at the right base, similar to prior study. No pneumothorax.  BONES: degenerative changes

## 2021-03-15 NOTE — DISCHARGE NOTE PROVIDER - NSDCCPCAREPLAN_GEN_ALL_CORE_FT
PRINCIPAL DISCHARGE DIAGNOSIS  Diagnosis: Fall  Assessment and Plan of Treatment: scalp laceration, staples placed, remove in 5-7 days after placement   Keep clean and dry      SECONDARY DISCHARGE DIAGNOSES  Diagnosis: HENRIQUE (acute kidney injury)  Assessment and Plan of Treatment: resolved  Hold lasix for now   f/u with PCP in 2-3 days    Diagnosis: Orthostatic hypotension  Assessment and Plan of Treatment: resolved, had IVF, lasix on hold  C/w spironolactone   FAll precautions    Diagnosis: Chronic CHF  Assessment and Plan of Treatment: C/w Spironolactone, started on Toprol, needs evaluation by cardio to start on ACEI/ARB/Entresto if repeat renal Fx stable

## 2021-03-30 DIAGNOSIS — I48.91 UNSPECIFIED ATRIAL FIBRILLATION: ICD-10-CM
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
## 2021-03-30 DIAGNOSIS — Z98.42 CATARACT EXTRACTION STATUS, LEFT EYE: ICD-10-CM

## 2021-03-30 DIAGNOSIS — N17.9 ACUTE KIDNEY FAILURE, UNSPECIFIED: ICD-10-CM

## 2021-03-30 DIAGNOSIS — Z95.5 PRESENCE OF CORONARY ANGIOPLASTY IMPLANT AND GRAFT: ICD-10-CM

## 2021-03-30 DIAGNOSIS — Z85.46 PERSONAL HISTORY OF MALIGNANT NEOPLASM OF PROSTATE: ICD-10-CM

## 2021-03-30 DIAGNOSIS — I25.10 ATHEROSCLEROTIC HEART DISEASE OF NATIVE CORONARY ARTERY WITHOUT ANGINA PECTORIS: ICD-10-CM

## 2021-03-30 DIAGNOSIS — R55 SYNCOPE AND COLLAPSE: ICD-10-CM

## 2021-03-30 DIAGNOSIS — Z79.899 OTHER LONG TERM (CURRENT) DRUG THERAPY: ICD-10-CM

## 2021-03-30 DIAGNOSIS — E11.9 TYPE 2 DIABETES MELLITUS WITHOUT COMPLICATIONS: ICD-10-CM

## 2021-03-30 DIAGNOSIS — Z90.2 ACQUIRED ABSENCE OF LUNG [PART OF]: ICD-10-CM

## 2021-03-30 DIAGNOSIS — Z85.118 PERSONAL HISTORY OF OTHER MALIGNANT NEOPLASM OF BRONCHUS AND LUNG: ICD-10-CM

## 2021-03-30 DIAGNOSIS — I45.10 UNSPECIFIED RIGHT BUNDLE-BRANCH BLOCK: ICD-10-CM

## 2021-03-30 DIAGNOSIS — Z79.84 LONG TERM (CURRENT) USE OF ORAL HYPOGLYCEMIC DRUGS: ICD-10-CM

## 2021-03-30 DIAGNOSIS — Z79.01 LONG TERM (CURRENT) USE OF ANTICOAGULANTS: ICD-10-CM

## 2021-03-30 DIAGNOSIS — M19.90 UNSPECIFIED OSTEOARTHRITIS, UNSPECIFIED SITE: ICD-10-CM

## 2021-03-30 DIAGNOSIS — S01.01XA LACERATION WITHOUT FOREIGN BODY OF SCALP, INITIAL ENCOUNTER: ICD-10-CM

## 2021-03-30 DIAGNOSIS — Z92.3 PERSONAL HISTORY OF IRRADIATION: ICD-10-CM

## 2021-03-30 DIAGNOSIS — I50.20 UNSPECIFIED SYSTOLIC (CONGESTIVE) HEART FAILURE: ICD-10-CM

## 2021-03-30 DIAGNOSIS — Z98.1 ARTHRODESIS STATUS: ICD-10-CM

## 2021-03-30 DIAGNOSIS — C61 MALIGNANT NEOPLASM OF PROSTATE: ICD-10-CM

## 2021-03-30 DIAGNOSIS — Z79.82 LONG TERM (CURRENT) USE OF ASPIRIN: ICD-10-CM

## 2021-03-30 DIAGNOSIS — Z98.41 CATARACT EXTRACTION STATUS, RIGHT EYE: ICD-10-CM

## 2021-03-30 DIAGNOSIS — Z88.8 ALLERGY STATUS TO OTHER DRUGS, MEDICAMENTS AND BIOLOGICAL SUBSTANCES: ICD-10-CM

## 2021-03-30 DIAGNOSIS — I95.1 ORTHOSTATIC HYPOTENSION: ICD-10-CM

## 2021-03-30 DIAGNOSIS — S01.81XA LACERATION WITHOUT FOREIGN BODY OF OTHER PART OF HEAD, INITIAL ENCOUNTER: ICD-10-CM
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
## 2021-03-30 DIAGNOSIS — I11.0 HYPERTENSIVE HEART DISEASE WITH HEART FAILURE: ICD-10-CM

## 2021-03-30 DIAGNOSIS — J43.9 EMPHYSEMA, UNSPECIFIED: ICD-10-CM

## 2021-06-07 NOTE — ED ADULT NURSE NOTE - PAIN: PRESENCE, MLM
complains of pain/discomfort
[FreeTextEntry1] : (MED HX) The patient is now 86 years old. I first saw him in 2006. At that time he had a history of a myocardial infarction and angioplasty back in 1994. He had done well for years although his activity level had diminished with age and he began complaining of fatigue. In December of 2011 he had a stress echocardiogram that was abnormal possibly with very abnormal blood pressure response and possibly with new wall motion abnormalities. He underwent a CTA and this was followed up by cardiac catheterization at [facility] in December of 2011. 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 The patient did well after that which is a minor adjustment of his medications. He has a history of hyperlipidemia and had a past history of hypertension. He stopped smoking 43 years ago, no diabetes, no family history of coronary artery disease.\par April 30, 2013 he was admitted to [facility] with left lower extremity cellulitis and altered mental status. He had positive blood cultures for strep pyogenes group A. An echocardiogram showed a possible small echodensity on the ventricular side of the noncoronary cusp. A joint aspiration of his left ankle was negative. He was treated with vancomycin and Zosyn, switched to cefepime. Followed by plastic surgery for wound care. No evidence of endocarditis clinically. A CAT scan of his abdomen and pelvis showed a 3.7 cm infrarenal abdominal aortic aneurysm. There was also bilateral inguinal hernias. 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 He was followed by Dr. Damián Lovelace of infectious disease. There is still a possibility he may need a skin graft for his left ankle. He was in the hospital for a little over 2 weeks and then in rehabilitation for 5 weeks. \par July 3, 2013. Here for followup visit. He had been home now for about 10 days and had already seen Dr. Lovelace and Dr. Jones his internist.  He denies chest pain or shortness of breath. There has been no syncope or near syncope or palpitations. Upon review of his medications the only change is that his Avapro is at a half of a 150 mg pill daily due to low blood pressure in the hospital and his simvastatin has been cut down from 20 mg to 10 mg. He did lose weight during this hospitalization as well. There have been no episodes of fevers, chills, sweats, etc.\par September 3, 2013. Here for followup. He is preparing to go on a trip to [place] in October. He does note shortness of breath with exertion but only with a lot of exertion, no problem going up stairs, and he thinks no different than even before his cellulitis episode. He denies exertional chest pain. We elected to see how he did with increased exercise and if his shortness of breath was not severe he would be okay to go on the trip to [place], obviously using commonsense as we discussed.\par January 6, 2014. The patient is here for followup. He did very well on the trip to [place] with some limitation from his knees but no significant shortness of breath or chest pain. He gets swelling on his left leg where he had the cellulitis but not on the right side. He only had one episode of slight lightheadedness when he got up too fast but otherwise has been tolerating the current medications. He is planning on going to [place] at the end of January. He has a mild cough, nonproductive, no fever chills. He claims he was unable to get an appointment with his internist. Her workup was unrevealing.\par May 5, 2014. The patient is here for followup. He saw Dr. Jones in the interim and had lab work with an excellent lipid profile but a high BNP. He saw vascular surgery because of the abdominal aortic aneurysm which measured 3.8 cm and that will be followed. He had an episode where his balance was off for about one hour. It only happened when he tried to walk around. He denies feeling lightheaded like he might pass out. If he was sitting and not moving he felt perfectly fine. There was no vertigo. He claims it happened him once before and it resolved as well. I advised him to discuss with Dr. Jones and perhaps a neurologist. In addition he brought up again with his wife his symptoms he's had for 40 years where he suddenly for no reason will get chest pain and pressure that radiates up to his jaw. It will actually go away if he does nothing but if he takes Maalox it will go away faster. I explained to him that this is probably esophageal reflux with esophageal spasm but it so infrequent that it is not worth for him to take a PPI on a daily basis. He denies having exertional chest pain or shortness of breath. He is considering possible knee replacement in the near future. He returned in July for preop clearance for knee replacement which he had in the middle of July with no complications Other than needing to be transfused 3 units and having been sent in to [facility] in the [facility] to have that done on 2 occasions.\par October 7, 2014. The patient is here for followup. He is feeling better and is more active but is also noting more shortness of breath with exertion or at least others with him have noticed it. He thinks he needs the other knee replacement done but his wife said she will divorce him if he does it. His TSH was elevated on labs with Dr. Jones last month so his Synthroid was increased from 6 days a week to 7 days a week. His BNP was elevated and he asked "should I worry about heart failure?". In addition Dr. Jones cut back his Avapro 300 to 150 because blood pressure was 110. The patient denies lightheadedness dizziness etc. His blood pressure today on 2 different occasions was 146/70. He also has an abdominal aortic aneurysm measuring 3.8 cm that is being followed. He'll be seeing Dr. Temple tomorrow.\par October 28, 2014. Dr. Jacobs called yesterday that the patient's abdominal aorta had increased in size by 0.9 cm and the patient is to be scheduled for an endovascular repair of his abdominal aortic aneurysm. He came here for his echocardiogram and discussion. The echo for the most part is unchanged from May of 2013 with left ventricular ejection fraction around 40%, mild to moderate MR, mild to moderate AI, segmental LV dysfunction with akinetic inferoposterior wall, and hypokinetic to akinetic inferolateral and mid lateral walls, normal anterior wall. LV size upper limit of normal. Normal RV size and function with an RVSP estimated at 41 mm of mercury.\par April 29, 2015. The patient did well with his endovascular repair of his abdominal aortic aneurysm. He went down to [place] and at one point was found to be short of breath with that was felt to be a pleural effusion. He was set up for a thoracentesis but after taking a diuretic for a few days they found there was no fluid there. His cough did not go away until he took a course of prednisone.The diuretic was stopped and he did well returning home. He took a tour of [place] and noted that any kind of incline made him very short of breath but no chest pressure. He saw Dr. Morris of pulmonary who found his proBNP to be 3400 and in the past it was only 1100. However his chest x-ray was totally clear. He has no PND or orthopnea and no real edema. She did give him another inhaler (Symbicort) and referred him here. His weight has not been up and if anything has gone down a little. His last echo was in October as above and was unchanged for the most part.  There is no chest pain with exertion. \par April 30, 2015. The patient returned for a stress echocardiogram. His baseline LV function looked worse than previously with left ventricular ejection fraction around 27%. With exercise he dropped his blood pressure and there were new wall motion abnormalities. He was scheduled for cardiac catheterization and probable defibrillator with possible biventricular pacemaker.\par May 6, 2015. Cardiac catheterization was done and revealed for the most part unchanged anatomy.  LVEF by V-gram was 35% . Given these findings this time Dr. Reyes did angioplasty and stent the right coronary artery. Electrophysiology felt he should wait at least 2 weeks and come back for another ejection fraction and clinical evaluation to see if things had improved before deciding on a  possible  AICD/biventricular pacemaker.\par May 21, 2015. The patient returns for followup and echocardiogram. He has felt much better over the 2 weeks but he has not been doing his usual activities because he was told not to do it. His echocardiogram to my eye looks unchanged. His exam is unchanged but there is no signs of congestive heart failure. I discussed the results with the patient and his wife. I told him that his improved symptoms could be because he is not that active, it could be a placebo effect of the procedure, it could be that things have improved and we just are unable to measure it on the echocardiogram. A BNP was sent and will be compared to his previous level which had gone up from the 400 range into the 800s. In addition over the weekend the patient will increase his activity and see what his level of symptoms are. If his symptoms are significant and worse than they had been a few months ago, we will schedule the ICD and biventricular pacemaker. The patient will contact me after the weekend.\par Nehal 15, 2015. The patient saw Dr. Russell of [facility] and unfortunately he has to wait 3 months (August 6, 2015) from the angioplasty unless he becomes unstable, has any arrhythmias, or needs a pacemaker for bradycardic reasons.  He did increase the carvedilol to 12.5 mg in the morning and 25 mg in the evening Currently he feels well and is stable. He does get out of breath and some weakness in his legs if he is walking uphill. When he initially lies down he feels some pressure he then says is shortness of breath but it resolves. No PND or orthopnea per se.\par July 14, 2015. The patient called a few weeks ago thinking he was more short of breath and wanted to move up the AICD/biventricular pacemaker. However after speaking with Dr. Russell of [facility] he explained he still was not qualified and must wait unless his symptoms are so severe that he requires hospitalization. I explained this to the patient and he returns today for routine followup.  He is still symptomatic with dyspnea on exertion but no PND or orthopnea. 3 months from his angioplasty will be August 6. He has a trip to [facility] planned with his daughter August 12.\par August 25, 2015. The patient is here for followup after having his AICD/biventricular pacemaker placed on August 7. No complications with the procedure. The patient does feel better in terms of the shortness of breath he would get when he was lying down but with exertion he feels maybe even worse right now in terms of shortness of breath. Of note he was away with a different diet and gained 4 pounds. (By scale here he has gained 9 pounds.) No palpitations, no syncope or near syncope. He is on carvedilol 25 mg the morning and 12.5 in the evening. He is still complaining of left arm pain ever since the procedure.\par September 8, 2015. The patient is finally starting to feel a little bit better. He cut his diuretic back to every other day and has continued to lose weight. His left arm so bothersome just as much since the procedure. We had a discussion about changing his Avapro to Entresto.\par October 8, 2015. Patient is here for followup. In the interim he had an episode of bronchitis treated with antibiotics and steroids which did cause some fluid retention and he was briefly on diuretics with improvement. He is now off the steroids but still on the diuretic. He does feel better on the Entresto. He thinks he is walking better with less shortness of breath and somewhat more stamina. He otherwise has no complaints. He remains in sinus rhythm and has had no AICD shocks. His blood pressure was borderline and his lungs were clear. I elected to stop the diuretic and increase his Entresto. \par October 23, 2015. On October 20 the patient saw Dr. Morris because of shortness of breath and edema. He had gained 9 pounds and he had significant swelling and she put him back on Lasix 40 mg as we discussed together. Patient had much relief and is here now. He lost 8 of the 9 pounds he again and feels much better. He still gets short of breath going up the stairs and often has to stop. He has trace edema still. His blood pressure is in the 90s systolic. He still eats out but not as much as he used to and they do ask for the low salt.\par November 9, 2015. The patient returns for followup. He has been on Lasix 40 mg since his last visit. He looks great, back to himself, with no complaints or symptoms of CHF. Her systolic pressure is only 90 but he has no symptoms of lightheadedness. He is having a lot of gas to the point of it being embarrassing and wondered if it is related to the Entresto. He also seems to have a dry cough. He will be seeing Dr. Russell in followup next week. Based on his complaints we cut back his Lasix to 20 mg daily but then he called back a few days later with more shortness of breath and went back to 40 mg q.d.\par December 7, 2015. Followup for the patient. As noted he had a go back to 40 mg Lasix because he became short of breath. He was still having GI complaints from the Entresto but he can manage it. He is complaining again of a urinating too much so perhaps we could try 30 mg of Lasix daily. He is seeing Dr. Russell next week. Is turning 88 soon and is planning a cruise on January 24.\par January 12, 2016. Patient is here for followup. Still has good spirits and is looking forward to going to [place] in 10 days. He is still having significant cough from the Entresto and we will have to change back to his prior regimen. Had echocardiogram at [facility] which was supposed to be  a dyssynchrony study and will be seeing Dr. Russell of [facility] tomorrow morning to discuss whether or not to try to improve his biventricular pacing. Currently on 80 mg of Lasix daily. Dr. Morris tried a new inhaler but he does not think it helped.\par March 2, 2016. The patient is here in followup. He went on his cruise and did well. His cough was stopped when he stopped the entresto. On the cruise he was able to cut back to 40 mg of Lasix  and even occasionally he didn't take it if he had a busy day. However after a while he did notice more shortness of breath and edema and the last few days he went back to 80 mg of Lasix with improvement. He is here now today, his weight has gone down 8 pounds, blood pressure was 90/60.\par May 3, 2016. Patient looks well and feels well, but continues to lose weight despite a good appetite. Dr. Jones is concerned and placed him on Ensure and if he does not put on some weight, he will be seeing GI. Abdominal CAT scan was unremarkable, except for some gastric distention and some thickening of the apex of the duodenum, which could just be underfilling. Abdominal aortic aneurysm repair was stable with sac being less than 3 cm. 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 He will follow up with vascular again in one year. He remains on 40 mg of Lasix and irbesartan. Not complaining of shortness of breath. He has done much better since Dr. Russell adjusted. His biventricular pacer by adjusting LV and RV timing. Lab work revealed a very suppressed TSH, so his Synthroid was held and he was to return in 2 weeks to reassess weight loss and thyroid status.\par May 18, 2016. Yesterday the patient was dizzy and lightheaded and fell and hit his head. He was evaluated in the emergency room, where everything was negative. His EKG was unchanged and his defibrillator was interrogated and there were no arrhythmias. His TSH was now 29! He is here today. Patient did fall again last night. Unclear if it is from being lightheaded or losing balance. No syncope. Other than when he had the fall, he has not been complaining of feeling lightheaded. His weight did go back up  off  the thyroid medication. I elected to hold his Lasix and have him reassessed in one week.\par May 24, 2016. Patient called yesterday and is short of breath, edema, and had put his weight back on. I told him to resume the Lasix and he is seen today for his followup. Mostly feels a little better and was able to lie flat last night. Still has some swelling. Blood pressure is still borderline low. Weight is up 6 pounds from last visit. We elected to continue Lasix 40 a day and changed his Synthroid to 0.1 mg daily.\par July 12, 2016. Patient is here for followup, as well as AICD interrogation. He seems to have normal biventricular pacing. Absolutely no arrhythmias for the past 4 months. Does get short of breath with exertion still, but not severe. Rarely will skip a day of Lasix if he has too many things to do. Started physical therapy, and was not out of breath at physical therapy today. His weight at home has been stable.\par September 13, 2016. Patient is getting around okay with a walker. His right knee seemed to be limiting him more than his shortness of breath and he has some trouble with his balance. No palpitation, syncope, or near syncope, or shocks. No change in any medication. He was still on Synthroid 0.1 mg. He is still doing physical therapy.\par November 16, 2016. The patient seems to be doing well, although he did have an episode of just suddenly falling backwards and hitting his head against the refrigerator. He denies lightheadedness or near syncope at the time. He denies vertigo, although he had a different episode. He reached up and looked up and had a vertigo-like episode just for a few seconds. There was no event on his defibrillator interrogation, except a 6 beat run of NSVT on September 24, which was asymptomatic. He is orthostatic here. He is also concerned about his weight loss, although his weight seems to be the same as it was last visit, but he claims he was almost 7 pounds less at home. His wife thinks he does not drink enough water. He is on the Lasix every other day. His EKG showed sinus rhythm with atrial tracking and biventricular pacing. He also has a compression fracture in his lumbar vertebra that is giving him pain\par He is also concerned about his thyroid hormone level, given his weight loss. We continued the Lasix on an every other day basis and lowered the Avapro to 150 mg. BNP was up to 4600 from 2600.\par December 21, 2016. The patient has here in followup. No more falls or complaints of orthostasis, although he is still orthostatic here from 124-106-98 standing with no symptoms. Complaining more of dyspnea on exertion and has some edema. His weight is about the same. After discussion, decided to add digoxin 0.125 q.d.\par February 8, 2017. The patient is here in followup. He has not fallen in a while now and he claims his problem is balance not dizziness. He thinks his dyspnea on exertion is unchanged on the digoxin. However his wife thinks he is better because he is not short of breath coming up the stairs and the  does agree. Just recently he started to develop some pedal edema, but he has had more salt because he loves soups. In addition, he does not sit with his legs elevated. (He also saw Dr. Plascencia for URI and was briefly on prednisone until a couple of weeks ago. He did have an SPEP with normal. Electrophoresis pattern.)\par April 4, 2017. Patient here in followup. Had pacemaker, defibrillator interrogation on March 6, with 2 short episodes of NSVT only. Seems to be doing well on the whole. Dyspnea on exertion is there, but unchanged. Weight is down 3 pounds. He occasionally gets numbness and tingling in his fingers, but not in his toes. Occasionally feels some discomfort over the AICD site. He will be going to his daughter and his niece for the [place] nights.\par Nehal 15, 2017. The patient is here in followup. Has been feeling a little more short of breath and does have a little bit of edema. He saw Dr. Valdez on Friday the ninth, who felt he had bronchitis and gave him Augmentin. Otherwise, patient has no complaints and has been doing pretty well. He still complains occasionally of some soreness over his AICD site. No other interim medical issues, and no change in medication.\par September 12, 2017. Patient is here for followup and defibrillator interrogation. As noted on August 25 had a syncopal episode after he had a hot shower and walked out to the other bathroom. Found himself on the floor and was fine. No recurrence since and in fact defibrillator interrogation shows that he had an episode of VT followed by VF after the device tried to pace terminate, which led to a defibrillator shock, and the patient has been fine since. He is still with sinus rhythm and a first-degree AV block underneath his ventricular pacing. No change in any of the symptoms and in fact, while he does get short of breath sometimes getting into bed, and some walking, was able to go to the gym and work out a little bit without shortness of breath. No chest pain, and no change in medications.\par December 26, 2017. Patient here in followup. Has been having some more shortness of breath, although seems to have frequent cough and URI. Now on a nebulizer. Does have worsening shortness of breath with lying down, but no PND. Had a near syncopal event when he got up right after sitting for a long time watching a movie, but otherwise no episodes of dizziness. AICD interrogation shows no episodes since the event in August. Patient thinks his overall stamina and fatigue are worse. Patient willing to retry Entresto.\par January 11, 2018. So far patient tolerating the Entresto, no cough. Maybe a little less short of breath, but not definite. Does have more edema, but weight is the same. No other complaints. Reluctant to increase the diuretic. We'll keep dose of the same for now and followup in one month if labs are okay. Then consider increasing Entresto.\par Comment:  \NING Genao - 21 Mar 2018 3:12 PM \par   TASK CREATED\par Hi-\par Saw Adam.\par He complained of weight gain, edema, SOB.\par Sent BNP and up to over 5300.\par Do you think that the Entresto is an issue for him?\par Asked him to call you.\par Thanks-\par Ning \par Addendum\par I reviewed the labs and spoke with the patient. Actually, weight is down, and labs are all okay except the BNP. If this is natural history of LV dysfunction, he should be on a higher dose of Entresto. I elected to increase the Entresto between now and his visit next week with me and see if things get worse, improve, or stay the same. Patient has a big cruise coming up on April 18 \par March 27, 2018. Patient returns in followup now on the increased dose of Entresto for one week. On his pacemaker interrogation he has normal function with biventricular pacing, no more VT. His "optivol" has been elevated since mid-January and just for the past week it seems to be coming down, which would correlate with increasing his Enteresto. He does admit to not being a strict on his diet, having Chinese food the other night, and has only been staying with the furosemide every other day despite the increased edema and shortness of breath. Depending on labs, I will probably continue the high-dose Entresto, continue furosemide daily through the beginning of Pass[place] this week and then go back to every other day depending on symptoms, weight and edema.\par May 22, 2018. The patient went on his vacation and did extremely well. By the very end before getting on the plane to come back he was somewhat short of breath and his wife considered going to the emergency room. Instead, he took extra Lasix and was doing well on the plane. At one point got up to the bathroom, came back sat in his seat and his wife leaned over to try to wake him up and they could not arouse him for up to 5 minutes. A doctor on the plane checked him, they took him out of his seat and laid him down on the floor at which point he seemed to come to and felt great. It seems he had a pulse and a blood pressure throughout. When he got off the plane he was brought to [facility] but interrogating the defibrillator showed absolutely no arrhythmias. (Of note, the next day in the hospital he had 2 long runs of nonsustained VT that were asymptomatic.) He did develop a severe cough with rhonchi and sputum. He was seen by Dr. Hema Reyes of pulmonary and discharged on prednisone and antibiotics. He saw Dr. Morris this morning and she just continued those medications and sent labs and told him she thought it was all from his heart. He did have a repeat echo in the hospital, which showed his AS has progressed to borderline severe.\par July 10, 2018. A one point after the hospitalization, the patient's blood pressure was running low and his Lasix was decreased. He developed more shortness of breath, and weight gain, and saw Dr. Romano on June 27. Lasix was put back to 40 mg daily. He is here today, feeling better, has lost 6 pounds. Was at ophthalmologist earlier today and has a new hemorrhage in his left eye, which is his better eye. The ophthalmologist would like me to cut back or hold the aspirin.\par September 12, 2018. Patient returns in followup, as well as for repeat echocardiogram regarding his aortic stenosis. His echo confirms somewhat of a low gradient severe aortic stenosis and is unchanged from May, but the patient feels wonderful maybe somewhat tired, but his breathing is better than it has been a long time and he is able to do physical therapy, etc. Patient was sent for evaluation with structural heart team.\par October 24, 2018. Patient returns for followup. Workup in hospital revealed no significant CAD, very tortuous vessels femorally so if patient needs TAVR would have to use apical approach. Pullback gradient across aortic valve was low as well (15). 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 Since being home the patient has been fairly stable although he does have increased edema since the catheterization. There was also some question about his potassium level. He got a flu shot from Dr. Morris the other day.\par January 23, 2019. The patient here in followup. He remains in sinus or AV paced. Feels good. Taking Lasix 40 mg daily. Blood pressure running on the low side, but no dizziness, lightheadedness, etc. Gets tiredness "from his legs, but not from shortness of breath". he says. He is on 2 new medications from his rheumatologist (Bl[facility]), gabapentin, and chlorzactazone. He would like to try to go again on at least a ten-day cruise although his wife is very nervous about it. No defibrillator shocks, etc. Off colchicine per Dr. Morris.\par April 24, 2019. Patient here in followup. Good spirits and seems to be doing very well on his current regimen. Remains in sinus rhythm with atrial tracking and ventricular pacing. One VPC noted. No congestive heart failure, despite [place].\par August 20, 2019.  Patient here in follow-up.  Pacemaker interrogation shows 1 or 2 runs of ventricular tachycardia that are pace terminated no shocks and patient was asymptomatic.  EKG shows sinus rhythm with left bundle branch block or more likely atrial tracking and biventricular pacing.  On the whole doing very well although feels that the water pill dictates his life and would like to change to every other day\par November 12, 2019.  Patient here in follow-up.  He remains AV paced at 70. Has a cough for the last 2 days initially productive but now not.  Denies shortness of breath.  Denies fever chills or achiness.  Not sure if there has been more salt in his diet.  His weight is up 2 pounds.  No chest pain shortness of breath etc.  Had a flu shot already.  Has an appointment with Dr. Morris later today. (Has luch[employer]  this weekend in [place] and another [employer] in a week and a half.).\par February 18, 2020.  Patient here for follow-up as well as defibrillator interrogation.  Now 89 years old.  Remains either sinus with ventricular pacing or AV pacing. Good spirits feeling well.  Not really doing that much walking but no complaints.  If he just moves around side to side he feels a little short of breath but it passes quickly.  No PND or orthopnea.  Complains about nocturia and again asking if he can lower the diuretic.  Weight is the same with slight edema. Rare brief NSVT. No a fib. 98% biV-paced.\par February 19, 2020. Reviewed labs with patient. Change digoxin to 6 days a week as his level is 2.0. Currently only on Lasix 40 mg once a day. Would continue and not cut back further.\par May 19, 2020.  Patient returns in follow-up.  Remains in sinus with atrial tracking and ventricular pacing versus AV pacing.  In pretty good spirits and has remained pretty stable from a cardiac point of view.  Asking how many years I think he can continue.\par Reviewed labs with patient. Digoxin level 2.1 so now will take it only 5 days a week skipping Monday and Friday. Satisfied with renal function potassium and other labs. BNP slightly higher than previously but still lower than when he was 7000 and 9000. Patient did have some mushroom barley soup from the [facility] that may have had more salt etc. No other change in medication as of now. \par August 19, 2020.  Patient returns in follow-up.  Saw Dr. Morris a few weeks ago.  BNP 4733, BUN 35 creatinine 1.6 potassium 4.3.  Normal LFTs.  Cholesterol 119, HDL 56, LDL 54, triglycerides 45.  Normal TSH.  Hemoglobin A1c 5.3.  No digoxin level done.  Hemoglobin 11.2 hematocrit 35.2.  Platelets 124,000\par EKG with sinus rhythm and ventricular pacing at 72.  Pacemaker AICD interrogation revealed only one episode of atrial fibrillation that lasted an hour and 48 minutes and 1 SVT of 9 beats and a second 1 of 6 beats.  Symptomatically he sounds pretty stable.  Only feels out of breath when he is trying to get into bed at night but during the day walking around he is unchanged and there is no PND.  There is mild edema that is chronic.  Digoxin was decreased last visit because of elevated levels of that will be rechecked today.  Last echo was almost 2 years ago so he will be scheduled for an echo with his next visit.  He was asking about his ejection fraction.\par October 21, 2020.  Patient returns in follow-up and for echocardiogram.  According to wife he may be getting just a little more short of breath at night and when he comes into bed.  Patient would like to cut back on the water pill however because he is in the bathroom all day.  I will allow him to try doing every other day on furosemide and reevaluate based on symptoms, weight, edema etc.  The echo today probably shows more calcium on the aortic valve and a little bit of progression of his aortic stenosis but his LVEF is 26% and overall unchanged.  Not clear that he will benefit from a TAVR.  Remains in sinus rhythm with atrial tracking and ventricular pacing.\par November 4, 2020.Patient's feet started swelling when he was taking to diuretics 1 day and none the next day so now we went back to 1 every day and seems to be improving. \par January 7, 2021 Patient went to Dr. Morris because he is short of breath but in fact has gained about 13 pounds, weight 173 and significant edema.  Possibly he has been off his diet.  Has been taking Lasix 40 a day so I told him to take 80 mg a day until the swelling is gone and to come see me sometime next week. \par January 12, 2021.  Patient returns here for follow-up.  Has lost 7 pounds and does feel a lot better but not 100% back to baseline.  Wife does admit there was dietary indiscretion so hopefully that was all there is.  Occasional fleeting sharp sticking left chest pain but no angina type symptoms.  No dizziness lightheadedness or defibrillator shocks.  Last AICD pacemaker check was August.  Last echo was October.\par January 14, 2021.  Reviewed labs and spoke with patient.  Slight increase in creatinine and BNP.  Weight seems to be leveling off patient almost back to baseline.  We will try going down to 80 mg alternating with 40 mg of Lasix daily until patient's next visit with me. \par February 22, 2021.  Patient here for follow-up visit and for AICD interrogation.  (Last week's appointment canceled because of the snowstorm).  Lost almost 10 pounds with the increase in the Lasix although still complaining of short of breath if he tries to do anything.  At rest and just around the room no problem.  He did ask how long I think he is going to live now that he is 89.  Pacemaker and AICD interrogation revealed just one episode for an hour of atrial fibrillation or flutter, short NSVT, 98.2% BiV pacing.  They have been very strict about watching the salt intake.\par February 23, 2021.Worsening BUN/Cr and no change BNP. Change to lasix 40 qd, keep strict with salt, f/u 4 weeks WITH ECHO. (? candidate for mitraclip?) \par March 23, 2021.  Patient returns for follow-up and for echocardiogram.  Weight seems about the same.  Still complaining about short of breath if he tries to do anythi\par \par  \par \par

## 2021-11-05 ENCOUNTER — INPATIENT (INPATIENT)
Facility: HOSPITAL | Age: 81
LOS: 3 days | Discharge: NURSING, REHAB OR LONG-TERM CARE | DRG: 178 | End: 2021-11-09
Attending: INTERNAL MEDICINE | Admitting: INTERNAL MEDICINE
Payer: OTHER GOVERNMENT

## 2021-11-05 VITALS
OXYGEN SATURATION: 93 % | TEMPERATURE: 97 F | HEIGHT: 70 IN | WEIGHT: 160.06 LBS | RESPIRATION RATE: 16 BRPM | HEART RATE: 55 BPM | DIASTOLIC BLOOD PRESSURE: 71 MMHG | SYSTOLIC BLOOD PRESSURE: 96 MMHG

## 2021-11-05 DIAGNOSIS — Z98.89 OTHER SPECIFIED POSTPROCEDURAL STATES: Chronic | ICD-10-CM

## 2021-11-05 DIAGNOSIS — Z98.890 OTHER SPECIFIED POSTPROCEDURAL STATES: Chronic | ICD-10-CM

## 2021-11-05 DIAGNOSIS — Z95.1 PRESENCE OF AORTOCORONARY BYPASS GRAFT: Chronic | ICD-10-CM

## 2021-11-05 DIAGNOSIS — Z98.1 ARTHRODESIS STATUS: Chronic | ICD-10-CM

## 2021-11-05 DIAGNOSIS — Z95.5 PRESENCE OF CORONARY ANGIOPLASTY IMPLANT AND GRAFT: Chronic | ICD-10-CM

## 2021-11-05 LAB
ALBUMIN SERPL ELPH-MCNC: 3.2 G/DL — LOW (ref 3.3–5)
ALP SERPL-CCNC: 93 U/L — SIGNIFICANT CHANGE UP (ref 40–120)
ALT FLD-CCNC: 19 U/L — SIGNIFICANT CHANGE UP (ref 12–78)
ANION GAP SERPL CALC-SCNC: 10 MMOL/L — SIGNIFICANT CHANGE UP (ref 5–17)
AST SERPL-CCNC: 24 U/L — SIGNIFICANT CHANGE UP (ref 15–37)
BASOPHILS # BLD AUTO: 0.13 K/UL — SIGNIFICANT CHANGE UP (ref 0–0.2)
BASOPHILS NFR BLD AUTO: 0.6 % — SIGNIFICANT CHANGE UP (ref 0–2)
BILIRUB SERPL-MCNC: 1.9 MG/DL — HIGH (ref 0.2–1.2)
BUN SERPL-MCNC: 41 MG/DL — HIGH (ref 7–23)
CALCIUM SERPL-MCNC: 10.4 MG/DL — HIGH (ref 8.5–10.1)
CHLORIDE SERPL-SCNC: 100 MMOL/L — SIGNIFICANT CHANGE UP (ref 96–108)
CO2 SERPL-SCNC: 26 MMOL/L — SIGNIFICANT CHANGE UP (ref 22–31)
CREAT SERPL-MCNC: 2.23 MG/DL — HIGH (ref 0.5–1.3)
EOSINOPHIL # BLD AUTO: 0 K/UL — SIGNIFICANT CHANGE UP (ref 0–0.5)
EOSINOPHIL NFR BLD AUTO: 0 % — SIGNIFICANT CHANGE UP (ref 0–6)
GLUCOSE SERPL-MCNC: 155 MG/DL — HIGH (ref 70–99)
HCT VFR BLD CALC: 49.5 % — SIGNIFICANT CHANGE UP (ref 39–50)
HGB BLD-MCNC: 16.4 G/DL — SIGNIFICANT CHANGE UP (ref 13–17)
IMM GRANULOCYTES NFR BLD AUTO: 0.7 % — SIGNIFICANT CHANGE UP (ref 0–1.5)
LACTATE SERPL-SCNC: 4.1 MMOL/L — CRITICAL HIGH (ref 0.7–2)
LYMPHOCYTES # BLD AUTO: 1.2 K/UL — SIGNIFICANT CHANGE UP (ref 1–3.3)
LYMPHOCYTES # BLD AUTO: 5.7 % — LOW (ref 13–44)
MAGNESIUM SERPL-MCNC: 1.6 MG/DL — SIGNIFICANT CHANGE UP (ref 1.6–2.6)
MCHC RBC-ENTMCNC: 32.4 PG — SIGNIFICANT CHANGE UP (ref 27–34)
MCHC RBC-ENTMCNC: 33.1 GM/DL — SIGNIFICANT CHANGE UP (ref 32–36)
MCV RBC AUTO: 97.8 FL — SIGNIFICANT CHANGE UP (ref 80–100)
MONOCYTES # BLD AUTO: 1.61 K/UL — HIGH (ref 0–0.9)
MONOCYTES NFR BLD AUTO: 7.7 % — SIGNIFICANT CHANGE UP (ref 2–14)
NEUTROPHILS # BLD AUTO: 17.85 K/UL — HIGH (ref 1.8–7.4)
NEUTROPHILS NFR BLD AUTO: 85.3 % — HIGH (ref 43–77)
NT-PROBNP SERPL-SCNC: HIGH PG/ML (ref 0–450)
PLATELET # BLD AUTO: 235 K/UL — SIGNIFICANT CHANGE UP (ref 150–400)
POTASSIUM SERPL-MCNC: 4.3 MMOL/L — SIGNIFICANT CHANGE UP (ref 3.5–5.3)
POTASSIUM SERPL-SCNC: 4.3 MMOL/L — SIGNIFICANT CHANGE UP (ref 3.5–5.3)
PROT SERPL-MCNC: 7.7 GM/DL — SIGNIFICANT CHANGE UP (ref 6–8.3)
RBC # BLD: 5.06 M/UL — SIGNIFICANT CHANGE UP (ref 4.2–5.8)
RBC # FLD: 12.4 % — SIGNIFICANT CHANGE UP (ref 10.3–14.5)
SODIUM SERPL-SCNC: 136 MMOL/L — SIGNIFICANT CHANGE UP (ref 135–145)
TROPONIN I, HIGH SENSITIVITY RESULT: 70.5 NG/L — SIGNIFICANT CHANGE UP
WBC # BLD: 20.94 K/UL — HIGH (ref 3.8–10.5)
WBC # FLD AUTO: 20.94 K/UL — HIGH (ref 3.8–10.5)

## 2021-11-05 PROCEDURE — 72125 CT NECK SPINE W/O DYE: CPT | Mod: 26,ME

## 2021-11-05 PROCEDURE — 70450 CT HEAD/BRAIN W/O DYE: CPT | Mod: 26,ME

## 2021-11-05 PROCEDURE — G1004: CPT

## 2021-11-05 PROCEDURE — 93010 ELECTROCARDIOGRAM REPORT: CPT

## 2021-11-05 PROCEDURE — 99285 EMERGENCY DEPT VISIT HI MDM: CPT

## 2021-11-05 PROCEDURE — 74176 CT ABD & PELVIS W/O CONTRAST: CPT | Mod: 26,ME

## 2021-11-05 PROCEDURE — 71250 CT THORAX DX C-: CPT | Mod: 26,ME

## 2021-11-05 PROCEDURE — 71045 X-RAY EXAM CHEST 1 VIEW: CPT | Mod: 26

## 2021-11-05 RX ORDER — ASPIRIN/CALCIUM CARB/MAGNESIUM 324 MG
1 TABLET ORAL
Qty: 0 | Refills: 0 | DISCHARGE

## 2021-11-05 RX ORDER — METOPROLOL TARTRATE 50 MG
1 TABLET ORAL
Qty: 0 | Refills: 0 | DISCHARGE

## 2021-11-05 RX ORDER — CHOLECALCIFEROL (VITAMIN D3) 125 MCG
1 CAPSULE ORAL
Qty: 0 | Refills: 0 | DISCHARGE

## 2021-11-05 RX ORDER — CEFTRIAXONE 500 MG/1
1000 INJECTION, POWDER, FOR SOLUTION INTRAMUSCULAR; INTRAVENOUS ONCE
Refills: 0 | Status: DISCONTINUED | OUTPATIENT
Start: 2021-11-05 | End: 2021-11-05

## 2021-11-05 RX ORDER — TRAZODONE HCL 50 MG
1 TABLET ORAL
Qty: 0 | Refills: 0 | DISCHARGE

## 2021-11-05 RX ORDER — CEFTRIAXONE 500 MG/1
1000 INJECTION, POWDER, FOR SOLUTION INTRAMUSCULAR; INTRAVENOUS ONCE
Refills: 0 | Status: COMPLETED | OUTPATIENT
Start: 2021-11-05 | End: 2021-11-05

## 2021-11-05 RX ORDER — IPRATROPIUM/ALBUTEROL SULFATE 18-103MCG
3 AEROSOL WITH ADAPTER (GRAM) INHALATION
Qty: 0 | Refills: 0 | DISCHARGE

## 2021-11-05 RX ORDER — PANTOPRAZOLE SODIUM 20 MG/1
1 TABLET, DELAYED RELEASE ORAL
Qty: 0 | Refills: 0 | DISCHARGE

## 2021-11-05 RX ORDER — ESCITALOPRAM OXALATE 10 MG/1
1 TABLET, FILM COATED ORAL
Qty: 0 | Refills: 0 | DISCHARGE

## 2021-11-05 RX ORDER — AZITHROMYCIN 500 MG/1
500 TABLET, FILM COATED ORAL ONCE
Refills: 0 | Status: COMPLETED | OUTPATIENT
Start: 2021-11-05 | End: 2021-11-05

## 2021-11-05 RX ORDER — SODIUM CHLORIDE 9 MG/ML
2500 INJECTION INTRAMUSCULAR; INTRAVENOUS; SUBCUTANEOUS ONCE
Refills: 0 | Status: COMPLETED | OUTPATIENT
Start: 2021-11-05 | End: 2021-11-05

## 2021-11-05 NOTE — ED ADULT TRIAGE NOTE - CHIEF COMPLAINT QUOTE
Pt. BIBEMS from [facility] c/o fall and weakness. As per EMS pt. fell yesterday, unknown headstrike/LOC. [facility] Pt. daughter states pt. has dementia and is altered at baseline

## 2021-11-05 NOTE — ED PROVIDER NOTE - MUSCULOSKELETAL, MLM
Spine appears normal, range of motion is not limited, TTP of chest wall,  5/5 strength in flexion and extension. 2+ DP pulse. Spine appears normal, range of motion is not limited, TTP of chest wall,  5/5 strength in flexion and extension. 2+ DP pulse bilaterally

## 2021-11-05 NOTE — ED PROVIDER NOTE - NSICDXPASTMEDICALHX_GEN_ALL_CORE_FT
PAST MEDICAL HISTORY:  BPH (benign prostatic hyperplasia) "chemo shrink prostate"    CAD (coronary artery disease) S/P coronary artery stents 2002, 2003    Carotid stenosis, left     Cataracts, bilateral lens implants    DM (diabetes mellitus)     HLD (hyperlipidemia)     HTN (hypertension)     Lung cancer     Osteoarthritis     Prostate ca

## 2021-11-05 NOTE — ED PROVIDER NOTE - NSICDXPASTSURGICALHX_GEN_ALL_CORE_FT
PAST SURGICAL HISTORY:  H/O spinal fusion 1997    History of laminectomy     S/P CABG x 3 1997    S/P carotid endarterectomy left, S/P left carotid stent 2000    S/P lobectomy of lung right upper 1998    S/P TURP     Stented coronary artery 2002, 2003

## 2021-11-05 NOTE — ED PROVIDER NOTE - CLINICAL SUMMARY MEDICAL DECISION MAKING FREE TEXT BOX
82 y/o male with a PMHx of OA, lung CA s/p lobectomy, DM, BPH, prostate CA s/p TURP, carotid stenosis s/p carotid endarterectomy, CAD s/p stent, HLD, HTN, laminectomy presents to the ED BIBEMS s/p fall. Plan: CT scan and labs.

## 2021-11-05 NOTE — PHARMACOTHERAPY INTERVENTION NOTE - COMMENTS
Medication reconciliation completed.  Reviewed Medication list and confirmed med allergies with list from [facility]; confirmed with Dr. First Medsundeep.

## 2021-11-05 NOTE — ED PROVIDER NOTE - NEUROLOGICAL, MLM
Alert and oriented, no focal deficits, no motor or sensory deficits, cranial nerves 2-12 intact, GCS 15, NIH scale 0.

## 2021-11-05 NOTE — ED ADULT NURSE NOTE - CHIEF COMPLAINT QUOTE
Pt. BIBEMS from [facility] c/o fall and weakness. As per EMS pt. fell yesterday, unknown headstrike/LOC. [facility] Pt. daughter states pt. has dementia and is altered at baseline

## 2021-11-05 NOTE — ED PROVIDER NOTE - PROGRESS NOTE DETAILS
Inocencio SAINZ: Patient's elevated lactate noted, patient given fluid hydration. Spoke with Dr. Hutson about patient's posterior PTX findings which is stable and seen from CT in march. Patient is admitted. Updated Dr. Medina about the findings. Hospitalist admission is appreciated. Inocencio SAINZ: Patient's elevated lactate noted, patient given fluid hydration. Spoke with Dr. Hutson about patient's posterior PTX findings which is stable and seen from CT in march. No acute intervention as per thoracic. Patient is admitted. Updated Dr. Medina about the findings. Hospitalist admission is appreciated.

## 2021-11-05 NOTE — ED PROVIDER NOTE - CARDIAC, MLM
Normal rate, regular rhythm.  Heart sounds S1, S2.  No murmurs, rubs or gallops. 2+ radial pulse. Normal rate, regular rhythm.  Heart sounds S1, S2.  No rubs or gallops. 2+ radial pulse in bilateral arms

## 2021-11-05 NOTE — ED PROVIDER NOTE - OBJECTIVE STATEMENT
80 y/o male with a PMHx of OA, lung CA s/p partial lobectomy, DM, BPH, prostate CA s/p TURP, carotid stenosis s/p carotid endarterectomy, CAD s/p stent, HLD, HTN, laminectomy presents to the ED BIBEMS s/p fall  vs syncope. Resides at [facility]. Per daughter bedside, his meds were switched which made him start to wake up in nights. Fell on 11/03, got a CXR which there was a  suspected rib fracture.  Received call last night that pt got out of bed and fell. Spoke to Dr Melendez who said he is going to switch back his meds . +SOB. +CP. No vision issues. On Xarelto.

## 2021-11-05 NOTE — ED ADULT NURSE NOTE - NSIMPLEMENTINTERV_GEN_ALL_ED
Implemented All Fall with Harm Risk Interventions:  [place] to call system. Call bell, personal items and telephone within reach. Instruct patient to call for assistance. Room bathroom lighting operational. Non-slip footwear when patient is off stretcher. Physically safe environment: no spills, clutter or unnecessary equipment. Stretcher in lowest position, wheels locked, appropriate side rails in place. Provide visual cue, wrist band, yellow gown, etc. Monitor gait and stability. Monitor for mental status changes and reorient to person, place, and time. Review medications for side effects contributing to fall risk. Reinforce activity limits and safety measures with patient and family. Provide visual clues: red socks.

## 2021-11-06 DIAGNOSIS — J69.0 PNEUMONITIS DUE TO INHALATION OF FOOD AND VOMIT: ICD-10-CM

## 2021-11-06 LAB
A1C WITH ESTIMATED AVERAGE GLUCOSE RESULT: 6.8 % — HIGH (ref 4–5.6)
ALBUMIN SERPL ELPH-MCNC: 2.5 G/DL — LOW (ref 3.3–5)
ALP SERPL-CCNC: 68 U/L — SIGNIFICANT CHANGE UP (ref 40–120)
ALT FLD-CCNC: 17 U/L — SIGNIFICANT CHANGE UP (ref 12–78)
ANION GAP SERPL CALC-SCNC: 6 MMOL/L — SIGNIFICANT CHANGE UP (ref 5–17)
ANION GAP SERPL CALC-SCNC: 7 MMOL/L — SIGNIFICANT CHANGE UP (ref 5–17)
AST SERPL-CCNC: 26 U/L — SIGNIFICANT CHANGE UP (ref 15–37)
BILIRUB SERPL-MCNC: 0.9 MG/DL — SIGNIFICANT CHANGE UP (ref 0.2–1.2)
BUN SERPL-MCNC: 45 MG/DL — HIGH (ref 7–23)
BUN SERPL-MCNC: 45 MG/DL — HIGH (ref 7–23)
CALCIUM SERPL-MCNC: 8.9 MG/DL — SIGNIFICANT CHANGE UP (ref 8.5–10.1)
CALCIUM SERPL-MCNC: 8.9 MG/DL — SIGNIFICANT CHANGE UP (ref 8.5–10.1)
CHLORIDE SERPL-SCNC: 105 MMOL/L — SIGNIFICANT CHANGE UP (ref 96–108)
CHLORIDE SERPL-SCNC: 105 MMOL/L — SIGNIFICANT CHANGE UP (ref 96–108)
CO2 SERPL-SCNC: 25 MMOL/L — SIGNIFICANT CHANGE UP (ref 22–31)
CO2 SERPL-SCNC: 26 MMOL/L — SIGNIFICANT CHANGE UP (ref 22–31)
COVID-19 NUCLEOCAPSID GAM AB INTERP: NEGATIVE — SIGNIFICANT CHANGE UP
COVID-19 NUCLEOCAPSID TOTAL GAM ANTIBODY RESULT: 0.1 INDEX — SIGNIFICANT CHANGE UP
COVID-19 SPIKE DOMAIN AB INTERP: POSITIVE
COVID-19 SPIKE DOMAIN ANTIBODY RESULT: 119 U/ML — HIGH
CREAT SERPL-MCNC: 1.85 MG/DL — HIGH (ref 0.5–1.3)
CREAT SERPL-MCNC: 2.1 MG/DL — HIGH (ref 0.5–1.3)
ESTIMATED AVERAGE GLUCOSE: 148 MG/DL — HIGH (ref 68–114)
GLUCOSE SERPL-MCNC: 169 MG/DL — HIGH (ref 70–99)
GLUCOSE SERPL-MCNC: 180 MG/DL — HIGH (ref 70–99)
HCT VFR BLD CALC: 38.8 % — LOW (ref 39–50)
HCT VFR BLD CALC: 40.2 % — SIGNIFICANT CHANGE UP (ref 39–50)
HGB BLD-MCNC: 12.7 G/DL — LOW (ref 13–17)
HGB BLD-MCNC: 13.4 G/DL — SIGNIFICANT CHANGE UP (ref 13–17)
LACTATE SERPL-SCNC: 2.3 MMOL/L — HIGH (ref 0.7–2)
LACTATE SERPL-SCNC: 3.4 MMOL/L — HIGH (ref 0.7–2)
LACTATE SERPL-SCNC: 3.5 MMOL/L — HIGH (ref 0.7–2)
MCHC RBC-ENTMCNC: 32.3 PG — SIGNIFICANT CHANGE UP (ref 27–34)
MCHC RBC-ENTMCNC: 32.7 GM/DL — SIGNIFICANT CHANGE UP (ref 32–36)
MCHC RBC-ENTMCNC: 32.7 PG — SIGNIFICANT CHANGE UP (ref 27–34)
MCHC RBC-ENTMCNC: 33.3 GM/DL — SIGNIFICANT CHANGE UP (ref 32–36)
MCV RBC AUTO: 98 FL — SIGNIFICANT CHANGE UP (ref 80–100)
MCV RBC AUTO: 98.7 FL — SIGNIFICANT CHANGE UP (ref 80–100)
PLATELET # BLD AUTO: 176 K/UL — SIGNIFICANT CHANGE UP (ref 150–400)
PLATELET # BLD AUTO: 188 K/UL — SIGNIFICANT CHANGE UP (ref 150–400)
POTASSIUM SERPL-MCNC: 4.7 MMOL/L — SIGNIFICANT CHANGE UP (ref 3.5–5.3)
POTASSIUM SERPL-MCNC: 5 MMOL/L — SIGNIFICANT CHANGE UP (ref 3.5–5.3)
POTASSIUM SERPL-SCNC: 4.7 MMOL/L — SIGNIFICANT CHANGE UP (ref 3.5–5.3)
POTASSIUM SERPL-SCNC: 5 MMOL/L — SIGNIFICANT CHANGE UP (ref 3.5–5.3)
PROT SERPL-MCNC: 6.1 GM/DL — SIGNIFICANT CHANGE UP (ref 6–8.3)
RBC # BLD: 3.93 M/UL — LOW (ref 4.2–5.8)
RBC # BLD: 4.1 M/UL — LOW (ref 4.2–5.8)
RBC # FLD: 12.6 % — SIGNIFICANT CHANGE UP (ref 10.3–14.5)
RBC # FLD: 12.6 % — SIGNIFICANT CHANGE UP (ref 10.3–14.5)
SARS-COV-2 IGG+IGM SERPL QL IA: 0.1 INDEX — SIGNIFICANT CHANGE UP
SARS-COV-2 IGG+IGM SERPL QL IA: 119 U/ML — HIGH
SARS-COV-2 IGG+IGM SERPL QL IA: NEGATIVE — SIGNIFICANT CHANGE UP
SARS-COV-2 IGG+IGM SERPL QL IA: POSITIVE
SARS-COV-2 RNA SPEC QL NAA+PROBE: SIGNIFICANT CHANGE UP
SODIUM SERPL-SCNC: 137 MMOL/L — SIGNIFICANT CHANGE UP (ref 135–145)
SODIUM SERPL-SCNC: 137 MMOL/L — SIGNIFICANT CHANGE UP (ref 135–145)
TROPONIN I, HIGH SENSITIVITY RESULT: 59.49 NG/L — SIGNIFICANT CHANGE UP
WBC # BLD: 18.27 K/UL — HIGH (ref 3.8–10.5)
WBC # BLD: 19.25 K/UL — HIGH (ref 3.8–10.5)
WBC # FLD AUTO: 18.27 K/UL — HIGH (ref 3.8–10.5)
WBC # FLD AUTO: 19.25 K/UL — HIGH (ref 3.8–10.5)

## 2021-11-06 PROCEDURE — 99285 EMERGENCY DEPT VISIT HI MDM: CPT | Mod: 25

## 2021-11-06 PROCEDURE — 99222 1ST HOSP IP/OBS MODERATE 55: CPT

## 2021-11-06 PROCEDURE — 85027 COMPLETE CBC AUTOMATED: CPT

## 2021-11-06 PROCEDURE — 12345: CPT | Mod: NC

## 2021-11-06 PROCEDURE — 82962 GLUCOSE BLOOD TEST: CPT

## 2021-11-06 PROCEDURE — 97162 PT EVAL MOD COMPLEX 30 MIN: CPT | Mod: GP

## 2021-11-06 PROCEDURE — U0003: CPT

## 2021-11-06 PROCEDURE — 80048 BASIC METABOLIC PNL TOTAL CA: CPT

## 2021-11-06 PROCEDURE — 36415 COLL VENOUS BLD VENIPUNCTURE: CPT

## 2021-11-06 PROCEDURE — 83605 ASSAY OF LACTIC ACID: CPT

## 2021-11-06 PROCEDURE — 97530 THERAPEUTIC ACTIVITIES: CPT | Mod: GP

## 2021-11-06 PROCEDURE — U0005: CPT

## 2021-11-06 PROCEDURE — 83036 HEMOGLOBIN GLYCOSYLATED A1C: CPT

## 2021-11-06 PROCEDURE — 86769 SARS-COV-2 COVID-19 ANTIBODY: CPT

## 2021-11-06 PROCEDURE — 97116 GAIT TRAINING THERAPY: CPT | Mod: GP

## 2021-11-06 PROCEDURE — 84484 ASSAY OF TROPONIN QUANT: CPT

## 2021-11-06 PROCEDURE — 99253 IP/OBS CNSLTJ NEW/EST LOW 45: CPT

## 2021-11-06 PROCEDURE — 80053 COMPREHEN METABOLIC PANEL: CPT

## 2021-11-06 PROCEDURE — 94640 AIRWAY INHALATION TREATMENT: CPT

## 2021-11-06 RX ORDER — ASPIRIN/CALCIUM CARB/MAGNESIUM 324 MG
81 TABLET ORAL DAILY
Refills: 0 | Status: DISCONTINUED | OUTPATIENT
Start: 2021-11-06 | End: 2021-11-09

## 2021-11-06 RX ORDER — METOPROLOL TARTRATE 50 MG
25 TABLET ORAL DAILY
Refills: 0 | Status: DISCONTINUED | OUTPATIENT
Start: 2021-11-06 | End: 2021-11-09

## 2021-11-06 RX ORDER — GLUCAGON INJECTION, SOLUTION 0.5 MG/.1ML
1 INJECTION, SOLUTION SUBCUTANEOUS ONCE
Refills: 0 | Status: DISCONTINUED | OUTPATIENT
Start: 2021-11-06 | End: 2021-11-09

## 2021-11-06 RX ORDER — CEFTRIAXONE 500 MG/1
1000 INJECTION, POWDER, FOR SOLUTION INTRAMUSCULAR; INTRAVENOUS EVERY 24 HOURS
Refills: 0 | Status: DISCONTINUED | OUTPATIENT
Start: 2021-11-06 | End: 2021-11-06

## 2021-11-06 RX ORDER — DEXTROSE 50 % IN WATER 50 %
25 SYRINGE (ML) INTRAVENOUS ONCE
Refills: 0 | Status: DISCONTINUED | OUTPATIENT
Start: 2021-11-06 | End: 2021-11-09

## 2021-11-06 RX ORDER — NATEGLINIDE 60 MG/1
1 TABLET, COATED ORAL
Qty: 0 | Refills: 0 | DISCHARGE

## 2021-11-06 RX ORDER — DEXTROSE 50 % IN WATER 50 %
15 SYRINGE (ML) INTRAVENOUS ONCE
Refills: 0 | Status: DISCONTINUED | OUTPATIENT
Start: 2021-11-06 | End: 2021-11-09

## 2021-11-06 RX ORDER — TAMSULOSIN HYDROCHLORIDE 0.4 MG/1
0.4 CAPSULE ORAL AT BEDTIME
Refills: 0 | Status: DISCONTINUED | OUTPATIENT
Start: 2021-11-06 | End: 2021-11-09

## 2021-11-06 RX ORDER — SODIUM CHLORIDE 9 MG/ML
1000 INJECTION, SOLUTION INTRAVENOUS
Refills: 0 | Status: DISCONTINUED | OUTPATIENT
Start: 2021-11-06 | End: 2021-11-09

## 2021-11-06 RX ORDER — TRAMADOL HYDROCHLORIDE 50 MG/1
50 TABLET ORAL THREE TIMES A DAY
Refills: 0 | Status: DISCONTINUED | OUTPATIENT
Start: 2021-11-06 | End: 2021-11-09

## 2021-11-06 RX ORDER — LANOLIN ALCOHOL/MO/W.PET/CERES
3 CREAM (GRAM) TOPICAL AT BEDTIME
Refills: 0 | Status: DISCONTINUED | OUTPATIENT
Start: 2021-11-06 | End: 2021-11-09

## 2021-11-06 RX ORDER — SODIUM CHLORIDE 9 MG/ML
1000 INJECTION INTRAMUSCULAR; INTRAVENOUS; SUBCUTANEOUS
Refills: 0 | Status: DISCONTINUED | OUTPATIENT
Start: 2021-11-06 | End: 2021-11-08

## 2021-11-06 RX ORDER — TRAMADOL HYDROCHLORIDE 50 MG/1
1 TABLET ORAL
Qty: 0 | Refills: 0 | DISCHARGE

## 2021-11-06 RX ORDER — INSULIN LISPRO 100/ML
VIAL (ML) SUBCUTANEOUS
Refills: 0 | Status: DISCONTINUED | OUTPATIENT
Start: 2021-11-06 | End: 2021-11-09

## 2021-11-06 RX ORDER — ACETAMINOPHEN 500 MG
650 TABLET ORAL EVERY 6 HOURS
Refills: 0 | Status: DISCONTINUED | OUTPATIENT
Start: 2021-11-06 | End: 2021-11-09

## 2021-11-06 RX ORDER — DEXTROSE 50 % IN WATER 50 %
12.5 SYRINGE (ML) INTRAVENOUS ONCE
Refills: 0 | Status: DISCONTINUED | OUTPATIENT
Start: 2021-11-06 | End: 2021-11-09

## 2021-11-06 RX ORDER — LIDOCAINE 4 G/100G
1 CREAM TOPICAL DAILY
Refills: 0 | Status: DISCONTINUED | OUTPATIENT
Start: 2021-11-06 | End: 2021-11-09

## 2021-11-06 RX ORDER — INSULIN LISPRO 100/ML
VIAL (ML) SUBCUTANEOUS AT BEDTIME
Refills: 0 | Status: DISCONTINUED | OUTPATIENT
Start: 2021-11-06 | End: 2021-11-09

## 2021-11-06 RX ORDER — RIVAROXABAN 15 MG-20MG
10 KIT ORAL DAILY
Refills: 0 | Status: DISCONTINUED | OUTPATIENT
Start: 2021-11-06 | End: 2021-11-09

## 2021-11-06 RX ORDER — TRAZODONE HCL 50 MG
50 TABLET ORAL AT BEDTIME
Refills: 0 | Status: DISCONTINUED | OUTPATIENT
Start: 2021-11-06 | End: 2021-11-09

## 2021-11-06 RX ORDER — ESCITALOPRAM OXALATE 10 MG/1
5 TABLET, FILM COATED ORAL DAILY
Refills: 0 | Status: DISCONTINUED | OUTPATIENT
Start: 2021-11-06 | End: 2021-11-09

## 2021-11-06 RX ORDER — CEFEPIME 1 G/1
1000 INJECTION, POWDER, FOR SOLUTION INTRAMUSCULAR; INTRAVENOUS ONCE
Refills: 0 | Status: DISCONTINUED | OUTPATIENT
Start: 2021-11-06 | End: 2021-11-06

## 2021-11-06 RX ORDER — CEFEPIME 1 G/1
1000 INJECTION, POWDER, FOR SOLUTION INTRAMUSCULAR; INTRAVENOUS EVERY 12 HOURS
Refills: 0 | Status: DISCONTINUED | OUTPATIENT
Start: 2021-11-06 | End: 2021-11-09

## 2021-11-06 RX ORDER — PANTOPRAZOLE SODIUM 20 MG/1
40 TABLET, DELAYED RELEASE ORAL
Refills: 0 | Status: DISCONTINUED | OUTPATIENT
Start: 2021-11-06 | End: 2021-11-09

## 2021-11-06 RX ORDER — CEFEPIME 1 G/1
1000 INJECTION, POWDER, FOR SOLUTION INTRAMUSCULAR; INTRAVENOUS ONCE
Refills: 0 | Status: COMPLETED | OUTPATIENT
Start: 2021-11-06 | End: 2021-11-06

## 2021-11-06 RX ORDER — TRAZODONE HCL 50 MG
1 TABLET ORAL
Qty: 0 | Refills: 0 | DISCHARGE

## 2021-11-06 RX ORDER — RIVAROXABAN 15 MG-20MG
1 KIT ORAL
Qty: 0 | Refills: 0 | DISCHARGE

## 2021-11-06 RX ORDER — ONDANSETRON 8 MG/1
4 TABLET, FILM COATED ORAL EVERY 8 HOURS
Refills: 0 | Status: DISCONTINUED | OUTPATIENT
Start: 2021-11-06 | End: 2021-11-09

## 2021-11-06 RX ORDER — ACETAMINOPHEN 500 MG
1 TABLET ORAL
Qty: 0 | Refills: 0 | DISCHARGE

## 2021-11-06 RX ADMIN — RIVAROXABAN 10 MILLIGRAM(S): KIT at 10:18

## 2021-11-06 RX ADMIN — CEFEPIME 1000 MILLIGRAM(S): 1 INJECTION, POWDER, FOR SOLUTION INTRAMUSCULAR; INTRAVENOUS at 20:56

## 2021-11-06 RX ADMIN — Medication 81 MILLIGRAM(S): at 10:17

## 2021-11-06 RX ADMIN — LIDOCAINE 1 PATCH: 4 CREAM TOPICAL at 15:49

## 2021-11-06 RX ADMIN — Medication 650 MILLIGRAM(S): at 13:45

## 2021-11-06 RX ADMIN — AZITHROMYCIN 500 MILLIGRAM(S): 500 TABLET, FILM COATED ORAL at 00:01

## 2021-11-06 RX ADMIN — PANTOPRAZOLE SODIUM 40 MILLIGRAM(S): 20 TABLET, DELAYED RELEASE ORAL at 05:51

## 2021-11-06 RX ADMIN — TRAMADOL HYDROCHLORIDE 50 MILLIGRAM(S): 50 TABLET ORAL at 22:44

## 2021-11-06 RX ADMIN — Medication 2: at 13:08

## 2021-11-06 RX ADMIN — TRAMADOL HYDROCHLORIDE 50 MILLIGRAM(S): 50 TABLET ORAL at 13:08

## 2021-11-06 RX ADMIN — CEFTRIAXONE 1000 MILLIGRAM(S): 500 INJECTION, POWDER, FOR SOLUTION INTRAMUSCULAR; INTRAVENOUS at 00:01

## 2021-11-06 RX ADMIN — Medication 3 MILLIGRAM(S): at 20:57

## 2021-11-06 RX ADMIN — CEFEPIME 1000 MILLIGRAM(S): 1 INJECTION, POWDER, FOR SOLUTION INTRAMUSCULAR; INTRAVENOUS at 02:59

## 2021-11-06 RX ADMIN — ESCITALOPRAM OXALATE 5 MILLIGRAM(S): 10 TABLET, FILM COATED ORAL at 10:18

## 2021-11-06 RX ADMIN — TRAMADOL HYDROCHLORIDE 50 MILLIGRAM(S): 50 TABLET ORAL at 13:45

## 2021-11-06 RX ADMIN — LIDOCAINE 1 PATCH: 4 CREAM TOPICAL at 19:28

## 2021-11-06 RX ADMIN — TRAMADOL HYDROCHLORIDE 50 MILLIGRAM(S): 50 TABLET ORAL at 05:51

## 2021-11-06 RX ADMIN — CEFEPIME 1000 MILLIGRAM(S): 1 INJECTION, POWDER, FOR SOLUTION INTRAMUSCULAR; INTRAVENOUS at 10:18

## 2021-11-06 RX ADMIN — Medication 650 MILLIGRAM(S): at 13:09

## 2021-11-06 RX ADMIN — TAMSULOSIN HYDROCHLORIDE 0.4 MILLIGRAM(S): 0.4 CAPSULE ORAL at 20:57

## 2021-11-06 RX ADMIN — SODIUM CHLORIDE 1250 MILLILITER(S): 9 INJECTION INTRAMUSCULAR; INTRAVENOUS; SUBCUTANEOUS at 00:01

## 2021-11-06 RX ADMIN — TRAMADOL HYDROCHLORIDE 50 MILLIGRAM(S): 50 TABLET ORAL at 20:57

## 2021-11-06 RX ADMIN — SODIUM CHLORIDE 80 MILLILITER(S): 9 INJECTION INTRAMUSCULAR; INTRAVENOUS; SUBCUTANEOUS at 11:02

## 2021-11-06 NOTE — H&P ADULT - NSHPPHYSICALEXAM_GEN_ALL_CORE
Vital Signs Last 24 Hrs  T(C): 36.6 (06 Nov 2021 03:06), Max: 36.6 (06 Nov 2021 03:06)  T(F): 97.9 (06 Nov 2021 03:06), Max: 97.9 (06 Nov 2021 03:06)  HR: 70 (06 Nov 2021 03:06) (55 - 98)  BP: 118/58 (06 Nov 2021 03:06) (96/71 - 118/58)  BP(mean): 76 (06 Nov 2021 03:06) (76 - 82)  RR: 19 (06 Nov 2021 03:06) (16 - 24)  SpO2: 98% (06 Nov 2021 03:06) (93% - 98%)

## 2021-11-06 NOTE — H&P ADULT - HISTORY OF PRESENT ILLNESS
82 y/o male with a PMHx of OA, lung CA s/p partial lobectomy, DM, BPH, prostate CA s/p TURP, carotid stenosis s/p carotid endarterectomy, CAD s/p stent, HLD, HTN, laminectomy presents to the ED BIBEMS s/p fall  vs syncope. Resides at [facility]. Per daughter bedside, his meds were switched which made him start to wake up in nights. Fell on 11/03, got a CXR which there was a  suspected rib fracture.  Received call last night that pt got out of bed and fell. Spoke to Dr Melendez who said he is going to switch back his meds . +SOB. +CP. No vision issues. On Xarelto. 82 y/o Male with a PMHx of OA, lung CA s/p partial lobectomy, DM, BPH, prostate CA s/p TURP, carotid stenosis s/p carotid endarterectomy, CAD s/p stent, HLD, HTN, laminectomy presents to the ED BIBEMS after multiple falls in recent days. He lso complain s of shortness of breath and right lower chest area pain. Her SOB is going to for more than 1 week. He also has some vision issues. She has no pain now. No fever. No other complain today.  80 y/o Male with a PMHx of OA, lung CA s/p partial lobectomy, DM, BPH, prostate CA s/p TURP, carotid stenosis s/p carotid endarterectomy, CAD s/p stent, HLD, HTN, laminectomy presents to the ED BIBEMS after multiple falls in recent days. He also complain s of shortness of breath and right lower chest area pain. His SOB is going on for more than 1 week. He has no pain now. No fever. No other complain today. Patient is not a good historian.

## 2021-11-06 NOTE — ED ADULT NURSE REASSESSMENT NOTE - NS ED NURSE REASSESS COMMENT FT1
Pt resting comfortably in bed. VSS. No acute distress noted at this time. Pt is clear for admission.

## 2021-11-06 NOTE — H&P ADULT - ASSESSMENT
A/P:    1.  Pneumonia  Lactic acidosis  HENRIQUE on CKD  -on IV Antibiotics   -got IVF  -follow labs and follow clinically    2.  Frequent falls  -keep on Fall precaution  -follow PT eval    3.  Xarelto for DVT ppx    4.  Code status: No MOLST form available in the chart. patient does not have capacity. Unable to reach the HCP, to verify. patient will be full code status for now.

## 2021-11-06 NOTE — H&P ADULT - NSICDXFAMHXNEG_GEN_ALL
atrial fibrillation/brain aneurysm/congestive heart failure/COPD/coronary disease/diabetes/kidney disease

## 2021-11-07 LAB
ANION GAP SERPL CALC-SCNC: 6 MMOL/L — SIGNIFICANT CHANGE UP (ref 5–17)
BUN SERPL-MCNC: 34 MG/DL — HIGH (ref 7–23)
CALCIUM SERPL-MCNC: 9 MG/DL — SIGNIFICANT CHANGE UP (ref 8.5–10.1)
CHLORIDE SERPL-SCNC: 106 MMOL/L — SIGNIFICANT CHANGE UP (ref 96–108)
CO2 SERPL-SCNC: 25 MMOL/L — SIGNIFICANT CHANGE UP (ref 22–31)
CREAT SERPL-MCNC: 1.09 MG/DL — SIGNIFICANT CHANGE UP (ref 0.5–1.3)
GLUCOSE SERPL-MCNC: 85 MG/DL — SIGNIFICANT CHANGE UP (ref 70–99)
HCT VFR BLD CALC: 36.3 % — LOW (ref 39–50)
HGB BLD-MCNC: 12.3 G/DL — LOW (ref 13–17)
MCHC RBC-ENTMCNC: 33 PG — SIGNIFICANT CHANGE UP (ref 27–34)
MCHC RBC-ENTMCNC: 33.9 GM/DL — SIGNIFICANT CHANGE UP (ref 32–36)
MCV RBC AUTO: 97.3 FL — SIGNIFICANT CHANGE UP (ref 80–100)
PLATELET # BLD AUTO: 149 K/UL — LOW (ref 150–400)
POTASSIUM SERPL-MCNC: 4.4 MMOL/L — SIGNIFICANT CHANGE UP (ref 3.5–5.3)
POTASSIUM SERPL-SCNC: 4.4 MMOL/L — SIGNIFICANT CHANGE UP (ref 3.5–5.3)
RBC # BLD: 3.73 M/UL — LOW (ref 4.2–5.8)
RBC # FLD: 12.6 % — SIGNIFICANT CHANGE UP (ref 10.3–14.5)
SODIUM SERPL-SCNC: 137 MMOL/L — SIGNIFICANT CHANGE UP (ref 135–145)
WBC # BLD: 15.44 K/UL — HIGH (ref 3.8–10.5)
WBC # FLD AUTO: 15.44 K/UL — HIGH (ref 3.8–10.5)

## 2021-11-07 PROCEDURE — 99233 SBSQ HOSP IP/OBS HIGH 50: CPT

## 2021-11-07 RX ADMIN — TAMSULOSIN HYDROCHLORIDE 0.4 MILLIGRAM(S): 0.4 CAPSULE ORAL at 21:34

## 2021-11-07 RX ADMIN — Medication 3 MILLIGRAM(S): at 21:34

## 2021-11-07 RX ADMIN — LIDOCAINE 1 PATCH: 4 CREAM TOPICAL at 08:44

## 2021-11-07 RX ADMIN — TRAMADOL HYDROCHLORIDE 50 MILLIGRAM(S): 50 TABLET ORAL at 21:34

## 2021-11-07 RX ADMIN — TRAMADOL HYDROCHLORIDE 50 MILLIGRAM(S): 50 TABLET ORAL at 13:54

## 2021-11-07 RX ADMIN — LIDOCAINE 1 PATCH: 4 CREAM TOPICAL at 03:00

## 2021-11-07 RX ADMIN — CEFEPIME 1000 MILLIGRAM(S): 1 INJECTION, POWDER, FOR SOLUTION INTRAMUSCULAR; INTRAVENOUS at 08:44

## 2021-11-07 RX ADMIN — Medication 25 MILLIGRAM(S): at 08:44

## 2021-11-07 RX ADMIN — CEFEPIME 1000 MILLIGRAM(S): 1 INJECTION, POWDER, FOR SOLUTION INTRAMUSCULAR; INTRAVENOUS at 21:34

## 2021-11-07 RX ADMIN — Medication 81 MILLIGRAM(S): at 08:44

## 2021-11-07 RX ADMIN — PANTOPRAZOLE SODIUM 40 MILLIGRAM(S): 20 TABLET, DELAYED RELEASE ORAL at 06:20

## 2021-11-07 RX ADMIN — RIVAROXABAN 10 MILLIGRAM(S): KIT at 08:45

## 2021-11-07 RX ADMIN — LIDOCAINE 1 PATCH: 4 CREAM TOPICAL at 21:42

## 2021-11-07 RX ADMIN — SODIUM CHLORIDE 80 MILLILITER(S): 9 INJECTION INTRAMUSCULAR; INTRAVENOUS; SUBCUTANEOUS at 06:20

## 2021-11-07 RX ADMIN — TRAMADOL HYDROCHLORIDE 50 MILLIGRAM(S): 50 TABLET ORAL at 22:30

## 2021-11-07 RX ADMIN — LIDOCAINE 1 PATCH: 4 CREAM TOPICAL at 20:00

## 2021-11-07 RX ADMIN — TRAMADOL HYDROCHLORIDE 50 MILLIGRAM(S): 50 TABLET ORAL at 06:20

## 2021-11-07 RX ADMIN — ESCITALOPRAM OXALATE 5 MILLIGRAM(S): 10 TABLET, FILM COATED ORAL at 08:44

## 2021-11-08 LAB
ANION GAP SERPL CALC-SCNC: 7 MMOL/L — SIGNIFICANT CHANGE UP (ref 5–17)
BUN SERPL-MCNC: 21 MG/DL — SIGNIFICANT CHANGE UP (ref 7–23)
CALCIUM SERPL-MCNC: 8.6 MG/DL — SIGNIFICANT CHANGE UP (ref 8.5–10.1)
CHLORIDE SERPL-SCNC: 103 MMOL/L — SIGNIFICANT CHANGE UP (ref 96–108)
CO2 SERPL-SCNC: 25 MMOL/L — SIGNIFICANT CHANGE UP (ref 22–31)
CREAT SERPL-MCNC: 0.81 MG/DL — SIGNIFICANT CHANGE UP (ref 0.5–1.3)
GLUCOSE SERPL-MCNC: 82 MG/DL — SIGNIFICANT CHANGE UP (ref 70–99)
HCT VFR BLD CALC: 35 % — LOW (ref 39–50)
HGB BLD-MCNC: 11.9 G/DL — LOW (ref 13–17)
MCHC RBC-ENTMCNC: 32.6 PG — SIGNIFICANT CHANGE UP (ref 27–34)
MCHC RBC-ENTMCNC: 34 GM/DL — SIGNIFICANT CHANGE UP (ref 32–36)
MCV RBC AUTO: 95.9 FL — SIGNIFICANT CHANGE UP (ref 80–100)
PLATELET # BLD AUTO: 152 K/UL — SIGNIFICANT CHANGE UP (ref 150–400)
POTASSIUM SERPL-MCNC: 4 MMOL/L — SIGNIFICANT CHANGE UP (ref 3.5–5.3)
POTASSIUM SERPL-SCNC: 4 MMOL/L — SIGNIFICANT CHANGE UP (ref 3.5–5.3)
RBC # BLD: 3.65 M/UL — LOW (ref 4.2–5.8)
RBC # FLD: 12.5 % — SIGNIFICANT CHANGE UP (ref 10.3–14.5)
SODIUM SERPL-SCNC: 135 MMOL/L — SIGNIFICANT CHANGE UP (ref 135–145)
WBC # BLD: 11.22 K/UL — HIGH (ref 3.8–10.5)
WBC # FLD AUTO: 11.22 K/UL — HIGH (ref 3.8–10.5)

## 2021-11-08 PROCEDURE — 99255 IP/OBS CONSLTJ NEW/EST HI 80: CPT

## 2021-11-08 PROCEDURE — 99232 SBSQ HOSP IP/OBS MODERATE 35: CPT

## 2021-11-08 PROCEDURE — 99231 SBSQ HOSP IP/OBS SF/LOW 25: CPT

## 2021-11-08 RX ORDER — ALBUTEROL 90 UG/1
2 AEROSOL, METERED ORAL EVERY 6 HOURS
Refills: 0 | Status: DISCONTINUED | OUTPATIENT
Start: 2021-11-08 | End: 2021-11-09

## 2021-11-08 RX ADMIN — ALBUTEROL 2 PUFF(S): 90 AEROSOL, METERED ORAL at 13:49

## 2021-11-08 RX ADMIN — Medication 3 MILLIGRAM(S): at 22:04

## 2021-11-08 RX ADMIN — TRAMADOL HYDROCHLORIDE 50 MILLIGRAM(S): 50 TABLET ORAL at 22:00

## 2021-11-08 RX ADMIN — ESCITALOPRAM OXALATE 5 MILLIGRAM(S): 10 TABLET, FILM COATED ORAL at 09:19

## 2021-11-08 RX ADMIN — SODIUM CHLORIDE 80 MILLILITER(S): 9 INJECTION INTRAMUSCULAR; INTRAVENOUS; SUBCUTANEOUS at 05:37

## 2021-11-08 RX ADMIN — Medication 81 MILLIGRAM(S): at 09:19

## 2021-11-08 RX ADMIN — Medication 25 MILLIGRAM(S): at 09:19

## 2021-11-08 RX ADMIN — LIDOCAINE 1 PATCH: 4 CREAM TOPICAL at 22:11

## 2021-11-08 RX ADMIN — Medication 2: at 16:59

## 2021-11-08 RX ADMIN — LIDOCAINE 1 PATCH: 4 CREAM TOPICAL at 09:18

## 2021-11-08 RX ADMIN — CEFEPIME 1000 MILLIGRAM(S): 1 INJECTION, POWDER, FOR SOLUTION INTRAMUSCULAR; INTRAVENOUS at 22:00

## 2021-11-08 RX ADMIN — ALBUTEROL 2 PUFF(S): 90 AEROSOL, METERED ORAL at 20:33

## 2021-11-08 RX ADMIN — LIDOCAINE 1 PATCH: 4 CREAM TOPICAL at 21:46

## 2021-11-08 RX ADMIN — TRAMADOL HYDROCHLORIDE 50 MILLIGRAM(S): 50 TABLET ORAL at 22:30

## 2021-11-08 RX ADMIN — TAMSULOSIN HYDROCHLORIDE 0.4 MILLIGRAM(S): 0.4 CAPSULE ORAL at 22:00

## 2021-11-08 RX ADMIN — PANTOPRAZOLE SODIUM 40 MILLIGRAM(S): 20 TABLET, DELAYED RELEASE ORAL at 05:38

## 2021-11-08 RX ADMIN — TRAMADOL HYDROCHLORIDE 50 MILLIGRAM(S): 50 TABLET ORAL at 13:34

## 2021-11-08 RX ADMIN — TRAMADOL HYDROCHLORIDE 50 MILLIGRAM(S): 50 TABLET ORAL at 05:38

## 2021-11-08 RX ADMIN — RIVAROXABAN 10 MILLIGRAM(S): KIT at 09:19

## 2021-11-08 RX ADMIN — CEFEPIME 1000 MILLIGRAM(S): 1 INJECTION, POWDER, FOR SOLUTION INTRAMUSCULAR; INTRAVENOUS at 09:18

## 2021-11-08 NOTE — PHYSICAL THERAPY INITIAL EVALUATION ADULT - IMPAIRMENTS FOUND, PT EVAL
arousal, attention, and cognition/cognitive impairment/gait, locomotion, and balance/gross motor/muscle strength

## 2021-11-08 NOTE — PROGRESS NOTE ADULT - ASSESSMENT
80yo M poor historian with PMH of OA, lung CA? s/p RUL wedge resection 1998, BPH, prostate CA s/p TURP, Carotid stenosis s/p CEA, CAD s/p CABG 1997, stent 2002 and 2003 (on Xarelto and ASA), HTN, HLD, DM, admitted from NH to  11/6 s/p fall. C/O Left sided anterior chest pain. Admits to multiple falls. Leukocytosis of 19 and elevated lactate on admission. CT Chest revealed Right lung consolidation unchanged since 8/8/18 with small loculated posterior right pneumothorax, no rib fractures. Thoracic surgery consulted for mass like consolidation. Followed by pulmonary on past admissions, unsure who he sees in the community. States he has had a lung biopsy of the mass approx. 4 years ago. PET in PACS from May 2020  read as  unchanged subpleural RLL mass with low grade avidity from 10/18/17. Unable to contact family at this time.     PLAN:  Would c/w ABX for PNA  Pain control  Incentive spirometer  Consider Pulm consult  May require repeat needle biopsy of right lung mass  Will need to hold xarelto if procedure is decided upon  Will attempt to reach family and discuss GOC   D/W Dr. Hutson

## 2021-11-08 NOTE — PHYSICAL THERAPY INITIAL EVALUATION ADULT - PERTINENT HX OF CURRENT PROBLEM, REHAB EVAL
Pt admitted from NH to  11/6 s/p fall. C/O Left sided anterior chest pain. Admits to multiple falls. He also complain s of shortness of breath and right lower chest area pain. His SOB is going on for more than 1 week. CT Chest revealed Right lung consolidation unchanged since 8/8/18 with small loculated posterior right pneumothorax,

## 2021-11-08 NOTE — PROGRESS NOTE ADULT - REASON FOR ADMISSION
Frequent falls and Pneumonia

## 2021-11-08 NOTE — PROGRESS NOTE ADULT - SUBJECTIVE AND OBJECTIVE BOX
Pt seen in fu  AVSS  no complaints  RLL progressive infiltrate with multiple previous imaging studies.  Based on significant progression since 3/21 malignancy must be considered  Likely needs TTNBx
80 y/o Male with a PMHx of OA, lung CA s/p partial lobectomy, DM, BPH, prostate CA s/p TURP, carotid stenosis s/p carotid endarterectomy, CAD s/p stent, HLD, HTN, laminectomy presents to the ED BIBEMS after multiple falls in recent days. He also complain s of shortness of breath and right lower chest area pain. His SOB is going on for more than 1 week. He has no pain now. No fever. No other complain today. Patient is not a good historian.       11/07/21: Patient seen and examined. Feels weak. Discussed with CTS yesterday, as per them may need lung biopsy for RLL opacity. Patient is very reluctant to have lung biopsy. He would like to follow up with his outpatient pulmonary.       Vital Signs Last 24 Hrs  T(C): 36.7 (07 Nov 2021 08:18), Max: 37.1 (06 Nov 2021 20:41)  T(F): 98.1 (07 Nov 2021 08:18), Max: 98.7 (06 Nov 2021 20:41)  HR: 100 (07 Nov 2021 08:18) (83 - 100)  BP: 135/79 (07 Nov 2021 08:18) (115/56 - 135/79)  BP(mean): --  RR: 17 (06 Nov 2021 20:41) (17 - 17)  SpO2: 97% (07 Nov 2021 08:18) (95% - 98%)            PHYSICAL EXAM:   · CONSTITUTIONAL: Well appearing, awake, alert, oriented to person, place, time/situation and in no apparent distress.  · ENMT: Airway patent, Nasal mucosa clear. Mouth with normal mucosa. Throat has no vesicles, no oropharyngeal exudates and uvula is midline.  · EYES: Clear bilaterally, pupils equal, round and reactive to light.  · CARDIAC: Normal rate, regular rhythm.  Heart sounds S1, S2.  No murmurs, rubs or gallops. 2+ radial pulse.  · RESPIRATORY: Breath sounds clear and equal bilaterally.  · GASTROINTESTINAL: Abdomen soft, non-tender, no guarding.  · MUSCULOSKELETAL: Spine appears normal, range of motion is not limited, TTP of chest wall,  5/5 strength in flexion and extension. 2+ DP pulse.  · NEUROLOGICAL: Alert and oriented, no focal deficits, no motor or sensory deficits, cranial nerves 2-12 intact, GCS 15, NIH scale 0.  · SKIN: Skin normal color for race, warm, dry and intact. No evidence of rash.                                       12.3   15.44 )-----------( 149      ( 07 Nov 2021 08:05 )             36.3     07 Nov 2021 08:05    137    |  106    |  34     ----------------------------<  85     4.4     |  25     |  1.09     Ca    9.0        07 Nov 2021 08:05  Mg     1.6       05 Nov 2021 20:52    TPro  6.1    /  Alb  2.5    /  TBili  0.9    /  DBili  x      /  AST  26     /  ALT  17     /  AlkPhos  68     06 Nov 2021 08:01    LIVER FUNCTIONS - ( 06 Nov 2021 08:01 )  Alb: 2.5 g/dL / Pro: 6.1 gm/dL / ALK PHOS: 68 U/L / ALT: 17 U/L / AST: 26 U/L / GGT: x             CAPILLARY BLOOD GLUCOSE      POCT Blood Glucose.: 139 mg/dL (07 Nov 2021 12:07)  POCT Blood Glucose.: 70 mg/dL (07 Nov 2021 07:26)  POCT Blood Glucose.: 128 mg/dL (06 Nov 2021 20:26)  POCT Blood Glucose.: 139 mg/dL (06 Nov 2021 16:57)                MEDICATIONS  (STANDING):  aspirin enteric coated 81 milliGRAM(s) Oral daily  cefepime  Injectable. 1000 milliGRAM(s) IV Push every 12 hours  dextrose 40% Gel 15 Gram(s) Oral once  dextrose 5%. 1000 milliLiter(s) (50 mL/Hr) IV Continuous <Continuous>  dextrose 5%. 1000 milliLiter(s) (100 mL/Hr) IV Continuous <Continuous>  dextrose 50% Injectable 25 Gram(s) IV Push once  dextrose 50% Injectable 12.5 Gram(s) IV Push once  dextrose 50% Injectable 25 Gram(s) IV Push once  escitalopram 5 milliGRAM(s) Oral daily  glucagon  Injectable 1 milliGRAM(s) IntraMuscular once  insulin lispro (ADMELOG) corrective regimen sliding scale   SubCutaneous three times a day before meals  insulin lispro (ADMELOG) corrective regimen sliding scale   SubCutaneous at bedtime  lidocaine   4% Patch 1 Patch Transdermal daily  metoprolol succinate ER 25 milliGRAM(s) Oral daily  pantoprazole    Tablet 40 milliGRAM(s) Oral before breakfast  rivaroxaban 10 milliGRAM(s) Oral daily  sodium chloride 0.9%. 1000 milliLiter(s) (80 mL/Hr) IV Continuous <Continuous>  tamsulosin 0.4 milliGRAM(s) Oral at bedtime  traMADol 50 milliGRAM(s) Oral three times a day    MEDICATIONS  (PRN):  acetaminophen     Tablet .. 650 milliGRAM(s) Oral every 6 hours PRN Temp greater or equal to 38C (100.4F), Mild Pain (1 - 3)  aluminum hydroxide/magnesium hydroxide/simethicone Suspension 30 milliLiter(s) Oral every 4 hours PRN Dyspepsia  melatonin 3 milliGRAM(s) Oral at bedtime PRN Insomnia  ondansetron Injectable 4 milliGRAM(s) IV Push every 8 hours PRN Nausea and/or Vomiting  traZODone 50 milliGRAM(s) Oral at bedtime PRN for insomnia              Assessment and Plan:   Assessment:  · Assessment	  A/P:    1.  Pneumonia  Right lower lobe opacity  H/O lung cancer S/P lobectomy  Continue IV cefepime  Blood cultures: no growth  Consult pulmonary  CT surgery consult appreciated  Patient reluctant lung biopsy at this time    2.  Frequent falls  -keep on Fall precaution  -follow PT eval    3.  Chr A Fib  Continue xarelto    4. CAD S/P stents  Continue outpatient meds.     5. Acute kidney injury  Prerenal due to poor oral intake  Resolved  Continue IV fluid for now  
CC: Falls, sob  · Subjective and Objective:   82 y/o Male with a PMHx of OA, lung CA s/p partial lobectomy, DM, BPH, prostate CA s/p TURP, carotid stenosis s/p carotid endarterectomy, CAD s/p stent, HLD, HTN, laminectomy presents to the ED BIBEMS after multiple falls in recent days. He also complain s of shortness of breath and right lower chest area pain. His SOB is going on for more than 1 week. He has no pain now. No fever. No other complain today. Patient is not a good historian.       11/07/21: Patient seen and examined. Feels weak. Discussed with CTS yesterday, as per them may need lung biopsy for RLL opacity. Patient is very reluctant to have lung biopsy. He would like to follow up with his outpatient pulmonary.   11/8: Feeling better, denies SOB, Denies fever, chills, N, V, abd pain, CP, SOB.    REVIEW OF SYSTEMS: All other review of systems is negative unless indicated above.    Vital Signs Last 24 Hrs  T(C): 36.6 (08 Nov 2021 07:06), Max: 36.8 (07 Nov 2021 15:13)  T(F): 97.9 (08 Nov 2021 07:06), Max: 98.2 (07 Nov 2021 15:13)  HR: 78 (08 Nov 2021 13:50) (72 - 88)  BP: 133/64 (08 Nov 2021 07:06) (110/56 - 133/64)  BP(mean): --  RR: 20 (08 Nov 2021 07:06) (16 - 20)  SpO2: 97% (08 Nov 2021 07:06) (94% - 97%)      PHYSICAL EXAM:    Constitutional: NAD, awake and alert, well-developed  HEENT: PERR, EOMI, Normal Hearing, MMM  Neck: Soft and supple  Respiratory: Breath sounds are clear bilaterally, No wheezing, rales or rhonchi  Cardiovascular: S1 and S2, regular rate and rhythm, no Murmurs, gallops or rubs  Gastrointestinal: Bowel Sounds present, soft, nontender, nondistended, no guarding, no rebound  Extremities: No peripheral edema  Neurological: A/O x 3, no focal deficits in my limited exam          MEDICATIONS  (STANDING):  ALBUTerol    90 MICROgram(s) HFA Inhaler 2 Puff(s) Inhalation every 6 hours  aspirin enteric coated 81 milliGRAM(s) Oral daily  cefepime  Injectable. 1000 milliGRAM(s) IV Push every 12 hours  dextrose 40% Gel 15 Gram(s) Oral once  dextrose 5%. 1000 milliLiter(s) (50 mL/Hr) IV Continuous <Continuous>  dextrose 5%. 1000 milliLiter(s) (100 mL/Hr) IV Continuous <Continuous>  dextrose 50% Injectable 25 Gram(s) IV Push once  dextrose 50% Injectable 12.5 Gram(s) IV Push once  dextrose 50% Injectable 25 Gram(s) IV Push once  escitalopram 5 milliGRAM(s) Oral daily  glucagon  Injectable 1 milliGRAM(s) IntraMuscular once  insulin lispro (ADMELOG) corrective regimen sliding scale   SubCutaneous three times a day before meals  insulin lispro (ADMELOG) corrective regimen sliding scale   SubCutaneous at bedtime  lidocaine   4% Patch 1 Patch Transdermal daily  metoprolol succinate ER 25 milliGRAM(s) Oral daily  pantoprazole    Tablet 40 milliGRAM(s) Oral before breakfast  rivaroxaban 10 milliGRAM(s) Oral daily  tamsulosin 0.4 milliGRAM(s) Oral at bedtime  traMADol 50 milliGRAM(s) Oral three times a day    MEDICATIONS  (PRN):  acetaminophen     Tablet .. 650 milliGRAM(s) Oral every 6 hours PRN Temp greater or equal to 38C (100.4F), Mild Pain (1 - 3)  aluminum hydroxide/magnesium hydroxide/simethicone Suspension 30 milliLiter(s) Oral every 4 hours PRN Dyspepsia  melatonin 3 milliGRAM(s) Oral at bedtime PRN Insomnia  ondansetron Injectable 4 milliGRAM(s) IV Push every 8 hours PRN Nausea and/or Vomiting  traZODone 50 milliGRAM(s) Oral at bedtime PRN for insomnia                                11.9   11.22 )-----------( 152      ( 08 Nov 2021 07:52 )             35.0     11-08    135  |  103  |  21  ----------------------------<  82  4.0   |  25  |  0.81    Ca    8.6      08 Nov 2021 07:52      CAPILLARY BLOOD GLUCOSE      POCT Blood Glucose.: 89 mg/dL (08 Nov 2021 11:57)  POCT Blood Glucose.: 83 mg/dL (08 Nov 2021 07:31)  POCT Blood Glucose.: 94 mg/dL (07 Nov 2021 21:33)  POCT Blood Glucose.: 131 mg/dL (07 Nov 2021 16:41)              Assessment and Plan:  81 year old man with frequent falls and SOB.    SOB: sepsis POA due to RLL PNA  H/O lung cancer S/P lobectomy  Right lower lobe opacity - cannot rule out malignancy.  Pt and daughter declines to pursue biopsy for further diagnostic testing  Continue IV cefepime for probable gram neg anita PNA  leukocytosis trending down   Blood cultures: no growth  CT surgery and pulm following    2.  Frequent falls  -Fall precaution  -PT     3.  Chr A Fib  Continue xarelto    4. CAD S/P stents  Continue outpatient meds.     5. Acute kidney injury / dehydration: RESOLVED  -s/p IVFs    Dispo:  -To [facility] followed by long term.  Updated daughter on plan of care.  Decline to pursue further diagnostic testing of lung mass.
  Subjective:  pt in bed NAD     T(C): 36.6 (11-08-21 @ 07:06), Max: 36.8 (11-07-21 @ 15:13)  HR: 81 (11-08-21 @ 07:06) (72 - 88)  BP: 133/64 (11-08-21 @ 07:06) (110/56 - 133/64)  ABP: --  ABP(mean): --  RR: 20 (11-08-21 @ 07:06) (16 - 20)  SpO2: 97% (11-08-21 @ 07:06) (94% - 97%) RA   Wt(kg): --  CVP(mm Hg): --  CO: --  CI: --  PA: --                                              Tele: SR               11-08    135  |  103  |  21  ----------------------------<  82  4.0   |  25  |  0.81    Ca    8.6      08 Nov 2021 07:52                                 11.9   11.22 )-----------( 152      ( 08 Nov 2021 07:52 )             35.0                 CAPILLARY BLOOD GLUCOSE      POCT Blood Glucose.: 83 mg/dL (08 Nov 2021 07:31)  POCT Blood Glucose.: 94 mg/dL (07 Nov 2021 21:33)  POCT Blood Glucose.: 131 mg/dL (07 Nov 2021 16:41)  POCT Blood Glucose.: 139 mg/dL (07 Nov 2021 12:07)           < from: CT Chest No Cont (11.05.21 @ 22:26) >  PROCEDURE:  CT of the Chest, Abdomen and Pelvis was performed.  Sagittal and coronal reformats were performed.    FINDINGS:  CHEST:  LUNGS AND LARGE AIRWAYS: Patent central airways. Redemonstration of mild centrilobular changes. Redemonstration of a peripheral right upper lobe opacity measuring 6.6 x 3.4 x 5.2 cm, unchanged. No new pulmonary nodules. Stable perifissural nodule along right major fissure.  PLEURA: Redemonstration of small loculated right posterior pneumothorax. Mild right-sided pleural thickening. No pleural effusion.  VESSELS: No central pulmonary embolism. No aortic dissection. Atherosclerotic calcification of the thoracic aorta and coronary arteries.  HEART: Heart size is normal. No pericardial effusion.  MEDIASTINUM AND ELIZA: Pericardial clips. No lymphadenopathy.  CHEST WALL AND LOWER NECK: Within normal limits.    ABDOMEN AND PELVIS:  LIVER: Within normal limits.  BILE DUCTS: Normal caliber.  GALLBLADDER: Gallstones.  SPLEEN: Within normal limits.  PANCREAS: Fatty atrophy  ADRENALS: Within normal limits.  KIDNEYS/URETERS: Bilateral renal cysts and indeterminate hypodense lesions too small to characterize    BLADDER: Incompletely distended.  REPRODUCTIVE ORGANS: Fiduciary markers in the prostate gland.    BOWEL: No bowel obstruction. Appendix is unremarkable. Colon is underdistended without significant fecal load. Sigmoid diverticulosis.  PERITONEUM: No ascites.  VESSELS: Within normal limits.  RETROPERITONEUM/LYMPH NODES: No lymphadenopathy.  ABDOMINAL WALL: Within normal limits.  BONES: The bones are diffusely osteopenic. Old right posterior fifth through seventh rib fractures are likely postsurgical. Sternotomy wires.    IMPRESSION:    No significant interval change compared to the previous study of March 15, 2021.    No evidence of new acute displaced fracture, pneumothorax, or hemothorax.  Stable appearance of emphysema, right lower lobe indeterminate opacity, loculated right posterior pneumothorax.    No acute findings in the abdomen or pelvis.    < end of copied text >      General: Cachectic  Neurology: Awake, nonfocal  Respiratory: B/L wheeze and right rhonchi, Left anterior chest tenderness and pain  CV: S1S2, no murmurs  Abdominal: Soft, NT, ND  Extremities: No edema              Assessment:  81yMale    with PAST MEDICAL & SURGICAL HISTORY:  HTN (hypertension)    HLD (hyperlipidemia)    CAD (coronary artery disease)  S/P coronary artery stents 2002, 2003    Carotid stenosis, left    BPH (benign prostatic hyperplasia)  &quot;chemo shrink prostate&quot;    Prostate ca    Cataracts, bilateral  lens implants    DM (diabetes mellitus)    Lung cancer    Osteoarthritis    S/P carotid endarterectomy  left, S/P left carotid stent 2000    Stented coronary artery  2002, 2003    S/P TURP    S/P lobectomy of lung  right upper 1998    S/P CABG x 3  1997    H/O spinal fusion  1997    History of laminectomy          Plan:

## 2021-11-08 NOTE — CONSULT NOTE ADULT - ASSESSMENT
81 year-old mass, with presented for shortness of breath and weakness, found to have lung mass. The mass has been present since 2017, at that time appeared to be more consolidative with air bronchograms. He now has some increased infiltrates around this area as compared to prior CT chest done 3/2021.    Problem List:  1. COPD  2. Lung mass and/or consolidation  3. New infiltrates possible pneumonia.    Recommendations:  --Discussed with his daughter today Norma - he would likely not want biopsy or aggressive measures.   --Should continue with antibiotics for now and supportive care.    Will follow     
80yo M poor historian with PMH of OA, lung CA? s/p RUL wedge resection 1998, BPH, prostate CA s/p TURP, Carotid stenosis s/p CEA, CAD s/p CABG 1997, stent 2002 and 2003 (on Xarelto and ASA), HTN, HLD, DM, admitted from NH to  11/6 s/p fall. C/O Left sided anterior chest pain. Admits to multiple falls. Leukocytosis of 19 and elevated lactate on admission. CT Chest revealed Right lung consolidation unchanged since 8/8/18 with small loculated posterior right pneumothorax, no rib fractures. Thoracic surgery consulted for mass like consolidation. Followed by pulmonary on past admissions, unsure who he sees in the community. States he has had a lung biopsy of the mass approx. 4 years ago. PET in PACS from May 2020  read as  unchanged subpleural RLL mass with low grade avidity from 10/18/17. Unable to contact family at this time.     PLAN:  Would c/w ABX for PNA  Pain control  Incentive spirometer  Consider Pulm consult  Will attempt contact with family again Monday regarding prior workup  May require repeat needle biopsy of right lung mass  Will need to hold xarelto if procedure is decided upon  D/W Dr. Caryl Bermudez PA  Thoracic Surgery  #[id number]

## 2021-11-08 NOTE — CONSULT NOTE ADULT - SUBJECTIVE AND OBJECTIVE BOX
HISTORY OF PRESENT ILLNESS:   history obtained from chart and medical record.     80 y/o Male with a PMHx of OA, lung CA s/p partial lobectomy, DM, BPH, prostate CA s/p TURP, carotid stenosis s/p carotid endarterectomy, CAD s/p stent, HLD, HTN, laminectomy presents to the ED BIBEMS after multiple falls in recent days. He also complain s of shortness of breath and right lower chest area pain. His SOB is going on for more than 1 week. He has no pain now. No fever. No other complain today. He was admitted managed for a pneumonia. Seen and examined today. He still has shortness of breath. He has some cough. No wheezing. No chest tightness. No hemoptysis. No fevers overnight.    Regarding pulmonary history. He is a significant smoker, approx 2ppd for 40 years, with emphysema and COPD.   He has unclear oncological history, as per daughter he has no known diagnosis of lung cancer, but has lung nodule, mass which has been followed on serial CT Scan.   He also has a history of VATS 2014, for lung nodule that was not able to be found intraoperatively.       ROS as per HPI otherwise negative.         PAST MEDICAL & SURGICAL HISTORY:  HTN (hypertension)  HLD (hyperlipidemia)  CAD (coronary artery disease)S/P coronary artery stents 2002, 2003  Carotid stenosis, left  BPH (benign prostatic hyperplasia)&quot;chemo shrink prostate&quot;  Prostate ca  Cataracts, bilaterallens implants  DM (diabetes mellitus)  Lung cancer  Osteoarthritis  S/P carotid endarterectomyleft, S/P left carotid stent 2000  Stented coronary [id number], 2003  S/P TURP  S/P lobectomy of lung right upper 1998?  S/P CABG x [id number]  H/O spinal fusion  1997  History of laminectomy      SOCIAL HISTORY:   ·	Former smoker, approx 80 pack year    FAMILY HISTORY:  No pertinent family history in first degree relatives      MEDICATIONS  (STANDING):  ALBUTerol    90 MICROgram(s) HFA Inhaler 2 Puff(s) Inhalation every 6 hours  aspirin enteric coated 81 milliGRAM(s) Oral daily  cefepime  Injectable. 1000 milliGRAM(s) IV Push every 12 hours  dextrose 40% Gel 15 Gram(s) Oral once  dextrose 5%. 1000 milliLiter(s) (50 mL/Hr) IV Continuous <Continuous>  dextrose 5%. 1000 milliLiter(s) (100 mL/Hr) IV Continuous <Continuous>  dextrose 50% Injectable 25 Gram(s) IV Push once  dextrose 50% Injectable 12.5 Gram(s) IV Push once  dextrose 50% Injectable 25 Gram(s) IV Push once  escitalopram 5 milliGRAM(s) Oral daily  glucagon  Injectable 1 milliGRAM(s) IntraMuscular once  insulin lispro (ADMELOG) corrective regimen sliding scale   SubCutaneous three times a day before meals  insulin lispro (ADMELOG) corrective regimen sliding scale   SubCutaneous at bedtime  lidocaine   4% Patch 1 Patch Transdermal daily  metoprolol succinate ER 25 milliGRAM(s) Oral daily  pantoprazole    Tablet 40 milliGRAM(s) Oral before breakfast  rivaroxaban 10 milliGRAM(s) Oral daily  tamsulosin 0.4 milliGRAM(s) Oral at bedtime  traMADol 50 milliGRAM(s) Oral three times a day    MEDICATIONS  (PRN):  acetaminophen     Tablet .. 650 milliGRAM(s) Oral every 6 hours PRN Temp greater or equal to 38C (100.4F), Mild Pain (1 - 3)  aluminum hydroxide/magnesium hydroxide/simethicone Suspension 30 milliLiter(s) Oral every 4 hours PRN Dyspepsia  melatonin 3 milliGRAM(s) Oral at bedtime PRN Insomnia  ondansetron Injectable 4 milliGRAM(s) IV Push every 8 hours PRN Nausea and/or Vomiting  traZODone 50 milliGRAM(s) Oral at bedtime PRN for insomnia      Allergies    fragrance (Unknown)  morphine (Hypotension)  oxycodone (Other)  paraphenylenediamine (Unknown)  terazosin (Unknown)    Intolerances        Vital Signs Last 24 Hrs  T(C): 36.6 (08 Nov 2021 07:06), Max: 36.8 (07 Nov 2021 15:13)  T(F): 97.9 (08 Nov 2021 07:06), Max: 98.2 (07 Nov 2021 15:13)  HR: 78 (08 Nov 2021 13:50) (72 - 88)  BP: 133/64 (08 Nov 2021 07:06) (110/56 - 133/64)  BP(mean): --  RR: 20 (08 Nov 2021 07:06) (16 - 20)  SpO2: 97% (08 Nov 2021 07:06) (94% - 97%)      PHYSICAL EXAMINATION:    GENERAL APPEARANCE:  No distress. No accessory muscle use.   HEAD: Normocephalic atraumatic   EYES: Pupils are normal. No icterus. Sclera white.   HEENT:  Mucus membranes moist. Oropharynx normal.   NECK:  Supple.No stridor.  HEART:  Normal S1 and S2. There are no murmurs, rubs or gallops noted  CHEST:  Clear to ausculation bilaterally. No wheezing. No crackles. No rhonchi   ABDOMEN:  Soft and nontender. Nondistended.   EXTREMITIES:  There is no cyanosis, clubbing or edema.   SKIN:  No rash or significant lesions are noted. No jaundice.  PSYCH: Flat affect.   NEURO: Awake, responds to questions, does not give detail regarding medical history        LABS:                        11.9   11.22 )-----------( 152      ( 08 Nov 2021 07:52 )             35.0     11-08    135  |  103  |  21  ----------------------------<  82  4.0   |  25  |  0.81    Ca    8.6      08 Nov 2021 07:52        RADIOLOGY & ADDITIONAL STUDIES:   *Imaging personally reviewed    EXAM:  CT ABDOMEN AND PELVIS                        EXAM:  CT CHEST                        PROCEDURE DATE:  11/05/2021    FINDINGS:  CHEST:  LUNGS AND LARGE AIRWAYS: Patent central airways. Redemonstration of mild centrilobular changes. Redemonstration of a peripheral right upper lobe opacity measuring 6.6 x 3.4 x 5.2 cm, unchanged. No new pulmonary nodules. Stable perifissural nodule along right major fissure.  PLEURA: Redemonstration of small loculated right posterior pneumothorax. Mild right-sided pleural thickening. No pleural effusion.  VESSELS: No central pulmonary embolism. No aortic dissection. Atherosclerotic calcification of the thoracic aorta and coronary arteries.  HEART: Heart size is normal. No pericardial effusion.  MEDIASTINUM AND ELIZA: Pericardial clips. No lymphadenopathy.  CHEST WALL AND LOWER NECK: Within normal limits.      
82yo M with PMH of OA, lung CA? s/p RUL wedge resection 1998, BPH, prostate CA s/p TURP, Carotid stenosis s/p CEA, CAD s/p CABG 1997, stent 2002 and 2003 (on Xarelto and ASA), HTN, HLD, DM, admitted to  11/6 s/p fall. C/O Left sided anterior chest pain. Admits to multiple falls. Leukocytosis of 19 and elevated lactate on admission. CT Chest revealed Right lung consolidation unchanged since 8/8/18 with small loculated posterior right pneumothorax, no rib fractures. Thoracic surgery consulted. CT appears with increased opacification around known mass. Patient admits to cough, HA, weight loss, back pain. No hemoptysis. Resides in NH. Poor historian. Followed by pulmonary on past admissions unsure who he sees in the community. States he has had a lung biopsy of the mass approx. 4 years ago. PET in PACS from May 2020  read as  unchanged subpleural RLL mass with low grade avidity from 10/18/17. Daughter and wife both no answer on telephone.       PAST MEDICAL & SURGICAL HISTORY:  HTN (hypertension)  HLD (hyperlipidemia)  CAD (coronary artery disease)  S/P coronary artery stents 2002, 2003  Carotid stenosis, left  BPH (benign prostatic hyperplasia)  &quot;chemo shrink prostate&quot;  Prostate ca  Cataracts, bilateral  lens implants  DM (diabetes mellitus)  Lung cancer  Osteoarthritis  S/P carotid endarterectomy  left, S/P left carotid stent 2000  Stented coronary artery  2002, 2003  S/P TURP  S/P lobectomy of lung  right upper 1998  S/P CABG x 3  1997  H/O spinal fusion  1997  History of laminectomy      MEDICATIONS  (STANDING):  aspirin enteric coated 81 milliGRAM(s) Oral daily  cefepime  Injectable. 1000 milliGRAM(s) IV Push every 12 hours  dextrose 40% Gel 15 Gram(s) Oral once  dextrose 5%. 1000 milliLiter(s) (50 mL/Hr) IV Continuous <Continuous>  dextrose 5%. 1000 milliLiter(s) (100 mL/Hr) IV Continuous <Continuous>  dextrose 50% Injectable 25 Gram(s) IV Push once  dextrose 50% Injectable 12.5 Gram(s) IV Push once  dextrose 50% Injectable 25 Gram(s) IV Push once  escitalopram 5 milliGRAM(s) Oral daily  glucagon  Injectable 1 milliGRAM(s) IntraMuscular once  insulin lispro (ADMELOG) corrective regimen sliding scale   SubCutaneous three times a day before meals  insulin lispro (ADMELOG) corrective regimen sliding scale   SubCutaneous at bedtime  metoprolol succinate ER 25 milliGRAM(s) Oral daily  pantoprazole    Tablet 40 milliGRAM(s) Oral before breakfast  rivaroxaban 10 milliGRAM(s) Oral daily  sodium chloride 0.9%. 1000 milliLiter(s) (80 mL/Hr) IV Continuous <Continuous>  tamsulosin 0.4 milliGRAM(s) Oral at bedtime  traMADol 50 milliGRAM(s) Oral three times a day    MEDICATIONS  (PRN):  acetaminophen     Tablet .. 650 milliGRAM(s) Oral every 6 hours PRN Temp greater or equal to 38C (100.4F), Mild Pain (1 - 3)  aluminum hydroxide/magnesium hydroxide/simethicone Suspension 30 milliLiter(s) Oral every 4 hours PRN Dyspepsia  melatonin 3 milliGRAM(s) Oral at bedtime PRN Insomnia  ondansetron Injectable 4 milliGRAM(s) IV Push every 8 hours PRN Nausea and/or Vomiting  traZODone 50 milliGRAM(s) Oral at bedtime PRN for insomnia      Allergies  fragrance (Unknown)  morphine (Hypotension)  oxycodone (Other)  paraphenylenediamine (Unknown)  terazosin (Unknown)      SOCIAL HISTORY:  Occupation: Not working, resides in [place]  Smoking Hx: Denies  Etoh Hx: Denies  IVDA Hx: Denies    FAMILY HISTORY:  No pertinent family history in first degree relatives        Vital Signs Last 24 Hrs  T(C): 36.3 (06 Nov 2021 08:43), Max: 36.6 (06 Nov 2021 03:06)  T(F): 97.4 (06 Nov 2021 08:43), Max: 97.9 (06 Nov 2021 03:06)  HR: 84 (06 Nov 2021 04:03) (55 - 98)  BP: 114/46 (06 Nov 2021 08:43) (96/71 - 118/58)  BP(mean): 76 (06 Nov 2021 03:06) (76 - 82)  RR: 18 (06 Nov 2021 04:04) (16 - 24)  SpO2: 95% (06 Nov 2021 08:43) (80% - 98%)    General: Cachectic  Neurology: Awake, nonfocal  Eyes: Scleras clear, EOMI  ENT: Gross hearing intact, grossly patent pharynx, no stridor  Neck: Neck supple, trachea midline  Respiratory: B/L wheeze and right rhonchi, Left anterior chest tenderness and pain  CV: S1S2, no murmurs  Abdominal: Soft, NT, ND  Extremities: No edema  Skin: No Rashes, Hematoma, Ecchymosis    LABS:                        12.7   18.27 )-----------( 176      ( 06 Nov 2021 08:01 )             38.8     11-06    137  |  105  |  45<H>  ----------------------------<  169<H>  4.7   |  26  |  1.85<H>    Ca    8.9      06 Nov 2021 08:01  Mg     1.6     11-05    TPro  6.1  /  Alb  2.5<L>  /  TBili  0.9  /  DBili  x   /  AST  26  /  ALT  17  /  AlkPhos  68  11-06          RADIOLOGY & ADDITIONAL STUDIES:  < from: CT Chest No Cont (11.05.21 @ 22:26) >  No significant interval change compared to the previous study of March 15, 2021.    No evidence of new acute displaced fracture, pneumothorax, or hemothorax.  Stable appearance of emphysema, right lower lobe indeterminate opacity, loculated right posterior pneumothorax.    No acute findings in the abdomen or pelvis.    < end of copied text >

## 2021-11-09 ENCOUNTER — TRANSCRIPTION ENCOUNTER (OUTPATIENT)
Age: 81
End: 2021-11-09

## 2021-11-09 VITALS
SYSTOLIC BLOOD PRESSURE: 105 MMHG | OXYGEN SATURATION: 95 % | TEMPERATURE: 99 F | RESPIRATION RATE: 18 BRPM | DIASTOLIC BLOOD PRESSURE: 88 MMHG | HEART RATE: 82 BPM

## 2021-11-09 LAB
ANION GAP SERPL CALC-SCNC: 8 MMOL/L — SIGNIFICANT CHANGE UP (ref 5–17)
BUN SERPL-MCNC: 13 MG/DL — SIGNIFICANT CHANGE UP (ref 7–23)
CALCIUM SERPL-MCNC: 8.5 MG/DL — SIGNIFICANT CHANGE UP (ref 8.5–10.1)
CHLORIDE SERPL-SCNC: 105 MMOL/L — SIGNIFICANT CHANGE UP (ref 96–108)
CO2 SERPL-SCNC: 22 MMOL/L — SIGNIFICANT CHANGE UP (ref 22–31)
CREAT SERPL-MCNC: 0.68 MG/DL — SIGNIFICANT CHANGE UP (ref 0.5–1.3)
GLUCOSE SERPL-MCNC: 119 MG/DL — HIGH (ref 70–99)
HCT VFR BLD CALC: 33 % — LOW (ref 39–50)
HGB BLD-MCNC: 11.4 G/DL — LOW (ref 13–17)
MCHC RBC-ENTMCNC: 32.1 PG — SIGNIFICANT CHANGE UP (ref 27–34)
MCHC RBC-ENTMCNC: 34.5 GM/DL — SIGNIFICANT CHANGE UP (ref 32–36)
MCV RBC AUTO: 93 FL — SIGNIFICANT CHANGE UP (ref 80–100)
PLATELET # BLD AUTO: 148 K/UL — LOW (ref 150–400)
POTASSIUM SERPL-MCNC: 4 MMOL/L — SIGNIFICANT CHANGE UP (ref 3.5–5.3)
POTASSIUM SERPL-SCNC: 4 MMOL/L — SIGNIFICANT CHANGE UP (ref 3.5–5.3)
RBC # BLD: 3.55 M/UL — LOW (ref 4.2–5.8)
RBC # FLD: 12 % — SIGNIFICANT CHANGE UP (ref 10.3–14.5)
SARS-COV-2 RNA SPEC QL NAA+PROBE: SIGNIFICANT CHANGE UP
SODIUM SERPL-SCNC: 135 MMOL/L — SIGNIFICANT CHANGE UP (ref 135–145)
WBC # BLD: 9.09 K/UL — SIGNIFICANT CHANGE UP (ref 3.8–10.5)
WBC # FLD AUTO: 9.09 K/UL — SIGNIFICANT CHANGE UP (ref 3.8–10.5)

## 2021-11-09 PROCEDURE — 99239 HOSP IP/OBS DSCHRG MGMT >30: CPT

## 2021-11-09 RX ORDER — BNT162B2 0.23 MG/2.25ML
0.3 INJECTION, SUSPENSION INTRAMUSCULAR
Qty: 0 | Refills: 0 | DISCHARGE

## 2021-11-09 RX ORDER — SPIRONOLACTONE 25 MG/1
1 TABLET, FILM COATED ORAL
Qty: 0 | Refills: 0 | DISCHARGE

## 2021-11-09 RX ADMIN — Medication 25 MILLIGRAM(S): at 10:24

## 2021-11-09 RX ADMIN — TRAMADOL HYDROCHLORIDE 50 MILLIGRAM(S): 50 TABLET ORAL at 05:57

## 2021-11-09 RX ADMIN — TRAMADOL HYDROCHLORIDE 50 MILLIGRAM(S): 50 TABLET ORAL at 06:33

## 2021-11-09 RX ADMIN — RIVAROXABAN 10 MILLIGRAM(S): KIT at 10:24

## 2021-11-09 RX ADMIN — CEFEPIME 1000 MILLIGRAM(S): 1 INJECTION, POWDER, FOR SOLUTION INTRAMUSCULAR; INTRAVENOUS at 10:24

## 2021-11-09 RX ADMIN — ALBUTEROL 2 PUFF(S): 90 AEROSOL, METERED ORAL at 08:19

## 2021-11-09 RX ADMIN — Medication 81 MILLIGRAM(S): at 10:24

## 2021-11-09 RX ADMIN — ALBUTEROL 2 PUFF(S): 90 AEROSOL, METERED ORAL at 14:01

## 2021-11-09 RX ADMIN — TRAMADOL HYDROCHLORIDE 50 MILLIGRAM(S): 50 TABLET ORAL at 14:37

## 2021-11-09 RX ADMIN — LIDOCAINE 1 PATCH: 4 CREAM TOPICAL at 10:24

## 2021-11-09 RX ADMIN — ESCITALOPRAM OXALATE 5 MILLIGRAM(S): 10 TABLET, FILM COATED ORAL at 10:24

## 2021-11-09 RX ADMIN — PANTOPRAZOLE SODIUM 40 MILLIGRAM(S): 20 TABLET, DELAYED RELEASE ORAL at 05:59

## 2021-11-09 NOTE — DISCHARGE NOTE PROVIDER - NSDCMRMEDTOKEN_GEN_ALL_CORE_FT
acetaminophen 500 mg oral tablet: 1 tab(s) orally every 4 hours, As Needed for pain/temp  aspirin 81 mg oral tablet: 1 tab(s) orally once a day  Augmentin 875 mg-125 mg oral tablet: 1 tab(s) orally 2 times a day   cholecalciferol 1000 intl units (25 mcg) oral tablet: 1 tab(s) orally once a day  Lexapro 5 mg oral tablet: 1 tab(s) orally once a day  nateglinide 60 mg oral tablet: 1 tab(s) orally 3 times a day (before meals)  pantoprazole 40 mg oral delayed release tablet: 1 tab(s) orally once a day  tamsulosin 0.4 mg oral capsule: 1 cap(s) orally once a day (at bedtime)  Toprol-XL 25 mg oral tablet, extended release: 1 tab(s) orally once a day  traMADol 50 mg oral tablet: 1 tab(s) orally 3 times a day for chronic pain  traZODone 50 mg oral tablet: 1 tab(s) orally once a day (at bedtime), As Needed - for insomnia.  Hold for lethargy  Xarelto 10 mg oral tablet: 1 tab(s) orally once a day

## 2021-11-09 NOTE — CDI QUERY NOTE - NSCDIOTHERTXTBX_GEN_ALL_CORE_HH
Validation Sepsis      This patient is documented to have Sepsis.      This patient does not appear to meet Northwell's sepsis criteria using SIRS (Temp. >101F or <96.8F, HR>90bpm, RR>20/min or PaCO2<32mmHg, WBC>12,000 or <4000 or Bands>10% Unexplained altered mental status).    Can you please clarify the status of the sepsis, after review?    -Sepsis ruled out  -Sepsis ruled in (please also document the criteria you are using to support the diagnosis of sepsis)  -Other (please specify)  -Unable to determine        Chart Documentation/Evidence:    VS POA POA T 36.2, O2 93 TA, HR 55, RR 16, 96/71     DC summary Hospital course:  Pt admitted with sepsis due to RLL PNA.  He was placed on IV cefepime with resolution of sepsis and leukocytosis.  Pt doing better, but overall weak.  Pt to be discharged to [facility] upon discharge on Augmentin to complete course.  Family and patient does not wish to pursue any further diagnostic testing for possible underlying lung malignancy.    SOB: sepsis POA due to RLL PNA  H/O lung cancer S/P lobectomy  Right lower lobe opacity - cannot rule out malignancy.  Pt and daughter declines to pursue biopsy for further diagnostic testing    ED and H&P does not document sepsis Validation Sepsis      This patient is documented to have Sepsis.      This patient does not appear to meet Northwell's sepsis criteria using SIRS (Temp. >101F or <96.8F, HR>90bpm, RR>20/min or PaCO2<32mmHg, WBC>12,000 or <4000 or Bands>10% Unexplained altered mental status).    Can you please clarify the status of the sepsis, after review?    -Sepsis ruled out  -Sepsis ruled in (please also document the criteria you are using to support the diagnosis of sepsis)  -Other (please specify)  -Unable to determine        Chart Documentation/Evidence:    VS POA POA T 36.2, O2 93 TA, HR 55, RR 16, 96/71     DC summary Hospital course:  Pt admitted with sepsis due to RLL PNA.  He was placed on IV cefepime with resolution of sepsis and leukocytosis.  Pt doing better, but overall weak.  Pt to be discharged to [facility] upon discharge on Augmentin to complete course.  Family and patient does not wish to pursue any further diagnostic testing for possible underlying lung malignancy.    SOB: sepsis POA due to RLL PNA  H/O lung cancer S/P lobectomy  Right lower lobe opacity - cannot rule out malignancy.  Pt and daughter declines to pursue biopsy for further diagnostic testing    ED and H&P does not document sepsis    WBC WBC Count: 9.09 K/uL (11.09.21 @ 07:43)   WBC Count: 11.22 K/uL (11.08.21 @ 07:52)   WBC Count: 15.44 K/uL (11.07.21 @ 08:05)   WBC Count: 18.27 K/uL (11.06.21 @ 08:01)   WBC Count: 19.25 K/uL (11.06.21 @ 02:49)   WBC Count: 20.94 K/uL (11.05.21 @ 20:52)       Lactate, Blood: 2.3: Elevated lactate. Consider ordering follow-up lactate to trend. mmol/L (11.06.21 @ 08:01)   Lactate, Blood: 3.4: Elevated lactate. Consider ordering follow-up lactate to trend. mmol/L (11.06.21 @ 04:23)   Lactate, Blood: 3.5: Elevated lactate. Consider ordering follow-up lactate to trend. mmol/L (11.06.21 @ 02:49)   Lactate, Blood: 5.6: Test Name:Lactate Called by:JOCELYNN Called to:Wilfrido   Read back 2 Pt IDs:y Read back values:y Date/Tm:11/06/2021 01:39:17 EDT mmol/L (11.06.21 @ 01:05)   Lactate, Blood: 4.1: Test Name:lactate Called by:nathaniel Called to:Jose penny rn   Read back 2 Pt IDs:y Read back values:y Date/Tm:11/05/2021 23:53:04 EDT mmol/L (11.05.21 @ 23:28)

## 2021-11-09 NOTE — DISCHARGE NOTE PROVIDER - HOSPITAL COURSE
CC: Falls, sob  · Subjective and Objective:   82 y/o Male with a PMHx of OA, lung CA s/p partial lobectomy, DM, BPH, prostate CA s/p TURP, carotid stenosis s/p carotid endarterectomy, CAD s/p stent, HLD, HTN, laminectomy presents to the ED BIBEMS after multiple falls in recent days. He also complain s of shortness of breath and right lower chest area pain. His SOB is going on for more than 1 week. He has no pain now. No fever. No other complain today. Patient is not a good historian.     Hospital course:  Pt admitted with sepsis due to RLL PNA.  He was placed on IV cefepime with resolution of sepsis and leukocytosis.  Pt doing better, but overall weak.  Pt to be discharged to [facility] upon discharge on Augmentin to complete course.  Family and patient does not wish to pursue any further diagnostic testing for possible underlying lung malignancy.      REVIEW OF SYSTEMS: All other review of systems is negative unless indicated above.    Vital Signs Last 24 Hrs  T(C): 36.6 (09 Nov 2021 08:00), Max: 36.7 (08 Nov 2021 16:25)  T(F): 97.9 (09 Nov 2021 08:00), Max: 98.1 (08 Nov 2021 16:25)  HR: 83 (09 Nov 2021 08:21) (78 - 89)  BP: 132/90 (09 Nov 2021 08:00) (113/85 - 132/90)  BP(mean): --  RR: 18 (09 Nov 2021 08:00) (18 - 18)  SpO2: 96% (09 Nov 2021 08:21) (96% - 98%)    PHYSICAL EXAM:    Constitutional: NAD, awake and alert, well-developed  HEENT: PERR, EOMI, Normal Hearing, MMM  Neck: Soft and supple  Respiratory: Breath sounds are clear bilaterally, No wheezing, rales or rhonchi  Cardiovascular: S1 and S2, regular rate and rhythm, no Murmurs, gallops or rubs  Gastrointestinal: Bowel Sounds present, soft, nontender, nondistended, no guarding, no rebound  Extremities: No peripheral edema  Neurological: A/O x 3, no focal deficits in my limited exam    med/labs: Reviewed and interpreted       Assessment and Plan:  81 year old man with frequent falls and SOB.    SOB: sepsis POA due to RLL PNA  H/O lung cancer S/P lobectomy  Right lower lobe opacity - cannot rule out malignancy.  Pt and daughter declines to pursue biopsy for further diagnostic testing  Continue IV cefepime for probable gram neg anita PNA and discharge on augmentin to complete pna course.   leukocytosis trending down   Blood cultures: no growth  CT surgery and pulm following    2.  Frequent falls  -Fall precaution  -PT     3.  Chr A Fib  Continue xarelto    4. CAD S/P stents  Continue outpatient meds.     5. Acute kidney injury / dehydration: RESOLVED  -s/p IVFs    Dispo:  -To MARIA G at [facility] followed by long term.  Updated daughter on plan of care.  Decline to pursue further diagnostic testing of lung mass.    Attending Statement: 40 minutes spent on total encounter and discharge planning.

## 2021-11-09 NOTE — DISCHARGE NOTE NURSING/CASE MANAGEMENT/SOCIAL WORK - NSDCVIVACCINE_GEN_ALL_CORE_FT
Tdap; 13-Mar-2021 06:47; Martha Blair (RN); [employer]; [id number] (Exp. Date: 31-Jul-2022); IntraMuscular; Deltoid Left.; 0.5 milliLiter(s); VIS (VIS Published: 09-May-2013, VIS Presented: 13-Mar-2021);

## 2021-11-09 NOTE — DISCHARGE NOTE PROVIDER - NSDCCPCAREPLAN_GEN_ALL_CORE_FT
PRINCIPAL DISCHARGE DIAGNOSIS  Diagnosis: Pneumonia, aspiration  Assessment and Plan of Treatment: Continue antibiotics as prescribed with Augmentin  4 more days to complete 7 day course  hold home dose spironolactone

## 2021-11-09 NOTE — DISCHARGE NOTE NURSING/CASE MANAGEMENT/SOCIAL WORK - PATIENT PORTAL LINK FT
You can access the FollowMyHealth Patient Portal offered by [facility] by registering at the following website: [email or web address]. By joining [employer]’s FollowMyHealth portal, you will also be able to view your health information using other applications (apps) compatible with our system.

## 2021-11-11 LAB
CULTURE RESULTS: SIGNIFICANT CHANGE UP
CULTURE RESULTS: SIGNIFICANT CHANGE UP
SPECIMEN SOURCE: SIGNIFICANT CHANGE UP
SPECIMEN SOURCE: SIGNIFICANT CHANGE UP

## 2021-11-12 DIAGNOSIS — Z90.2 ACQUIRED ABSENCE OF LUNG [PART OF]: ICD-10-CM

## 2021-11-12 DIAGNOSIS — I25.10 ATHEROSCLEROTIC HEART DISEASE OF NATIVE CORONARY ARTERY WITHOUT ANGINA PECTORIS: ICD-10-CM

## 2021-11-12 DIAGNOSIS — E11.22 TYPE 2 DIABETES MELLITUS WITH DIABETIC CHRONIC KIDNEY DISEASE: ICD-10-CM

## 2021-11-12 DIAGNOSIS — Z85.46 PERSONAL HISTORY OF MALIGNANT NEOPLASM OF PROSTATE: ICD-10-CM

## 2021-11-12 DIAGNOSIS — I48.20 CHRONIC ATRIAL FIBRILLATION, UNSPECIFIED: ICD-10-CM

## 2021-11-12 DIAGNOSIS — I10 ESSENTIAL (PRIMARY) HYPERTENSION: ICD-10-CM

## 2021-11-12 DIAGNOSIS — N17.9 ACUTE KIDNEY FAILURE, UNSPECIFIED: ICD-10-CM

## 2021-11-12 DIAGNOSIS — Z95.1 PRESENCE OF AORTOCORONARY BYPASS GRAFT: ICD-10-CM

## 2021-11-12 DIAGNOSIS — N40.0 BENIGN PROSTATIC HYPERPLASIA WITHOUT LOWER URINARY TRACT SYMPTOMS: ICD-10-CM

## 2021-11-12 DIAGNOSIS — I12.9 HYPERTENSIVE CHRONIC KIDNEY DISEASE WITH STAGE 1 THROUGH STAGE 4 CHRONIC KIDNEY DISEASE, OR UNSPECIFIED CHRONIC KIDNEY DISEASE: ICD-10-CM

## 2021-11-12 DIAGNOSIS — J69.0 PNEUMONITIS DUE TO INHALATION OF FOOD AND VOMIT: ICD-10-CM

## 2021-11-12 DIAGNOSIS — Z98.61 CORONARY ANGIOPLASTY STATUS: ICD-10-CM

## 2021-11-12 DIAGNOSIS — E87.2 ACIDOSIS: ICD-10-CM

## 2021-11-12 DIAGNOSIS — Z88.5 ALLERGY STATUS TO NARCOTIC AGENT: ICD-10-CM

## 2021-11-12 DIAGNOSIS — M19.90 UNSPECIFIED OSTEOARTHRITIS, UNSPECIFIED SITE: ICD-10-CM

## 2021-11-12 DIAGNOSIS — Z79.82 LONG TERM (CURRENT) USE OF ASPIRIN: ICD-10-CM

## 2021-11-12 DIAGNOSIS — E78.5 HYPERLIPIDEMIA, UNSPECIFIED: ICD-10-CM

## 2021-11-12 DIAGNOSIS — Z85.118 PERSONAL HISTORY OF OTHER MALIGNANT NEOPLASM OF BRONCHUS AND LUNG: ICD-10-CM

## 2021-11-12 DIAGNOSIS — Z91.81 HISTORY OF FALLING: ICD-10-CM

## 2023-09-19 NOTE — ED PROVIDER NOTE - INTERNATIONAL TRAVEL
No

## 2023-10-01 PROBLEM — K86.89 PANCREATIC MASS: Status: ACTIVE | Noted: 2019-01-16

## 2025-05-18 NOTE — PHYSICAL THERAPY INITIAL EVALUATION ADULT - PERSONAL SAFETY AND JUDGMENT, REHAB EVAL
impaired
BShoulder passive flexion and abduction limited to ~ 110 degrees/bilateral lower extremity Passive ROM was WFL (within functional limits)

## 2025-05-20 NOTE — PATIENT PROFILE ADULT. - SURGICAL SITE INCISION
Problem: At Risk for Falls  Goal: Patient does not fall  Outcome: Monitoring/Evaluating progress  Goal: Patient takes action to control fall-related risks  Outcome: Monitoring/Evaluating progress     Problem: At Risk for Injury Due to Fall  Goal: Patient does not fall  Outcome: Monitoring/Evaluating progress  Goal: Takes action to control condition specific risks  Outcome: Monitoring/Evaluating progress  Goal: Verbalizes understanding of fall-related injury personal risks  Description: Document education using the patient education activity  Outcome: Monitoring/Evaluating progress     Problem: Pain  Goal: Acceptable pain level achieved/maintained at rest using appropriate pain scale for the patient  Outcome: Monitoring/Evaluating progress  Goal: Acceptable pain level achieved/maintained with activity using appropriate pain scale for the patient  Outcome: Monitoring/Evaluating progress  Goal: Acceptable pain level achieved/maintained without oversedation  Outcome: Monitoring/Evaluating progress     Problem: Impaired Physical Mobility  Goal: Functional status is maintained or returned to baseline during hospitalization  Outcome: Monitoring/Evaluating progress  Goal: Tolerates activity for discharge setting with no abnormal symptoms  Outcome: Monitoring/Evaluating progress     Problem: Skin Integrity Alteration  Goal: Skin remains intact with no new/deterioration of wound or pressure injury  Outcome: Monitoring/Evaluating progress  Goal: Participates in wound care activities  Outcome: Monitoring/Evaluating progress     Problem: Hemodialysis  Goal: Fistula/graft intact as evidenced by presence of bruit & thrill  Outcome: Monitoring/Evaluating progress  Goal: Vascular access device site free of signs & symptoms of infection  Outcome: Monitoring/Evaluating progress  Goal: Dialysis: Safe, effective, and comfortable hemodialysis treatment (Hemodialysis nurse only)  Outcome: Monitoring/Evaluating progress  Goal: Dialysis:  Free of complications related to initiation/termination of dialysis (Hemodialysis nurse only)  Outcome: Monitoring/Evaluating progress  Goal: Verbalizes understanding of hemodialysis care  Description: Document on Patient Education Activity  Outcome: Monitoring/Evaluating progress      no